# Patient Record
Sex: MALE | Race: WHITE | NOT HISPANIC OR LATINO | Employment: FULL TIME | URBAN - METROPOLITAN AREA
[De-identification: names, ages, dates, MRNs, and addresses within clinical notes are randomized per-mention and may not be internally consistent; named-entity substitution may affect disease eponyms.]

---

## 2017-04-20 DIAGNOSIS — E78.5 HYPERLIPIDEMIA LDL GOAL <70: ICD-10-CM

## 2017-04-20 DIAGNOSIS — I10 ESSENTIAL HYPERTENSION WITH GOAL BLOOD PRESSURE LESS THAN 140/90: ICD-10-CM

## 2017-04-20 NOTE — TELEPHONE ENCOUNTER
Simvastatin     Last Written Prescription Date: 07/12/2016  Last Fill Quantity: 90, # refills: 3  Last Office Visit with [facility], [facility] or [facility] prescribing provider: Dr Perkins       Lab Results   Component Value Date    CHOL 168 06/30/2016     Lab Results   Component Value Date    HDL 37 06/30/2016     Lab Results   Component Value Date    LDL 94 06/30/2016     Lab Results   Component Value Date    TRIG 183 06/30/2016     Lab Results   Component Value Date    CHOLHDLRATIO 5.2 07/28/2015     Lisinopril      Last Written Prescription Date: 07/12/2016  Last Fill Quantity: 90, # refills: 3  Last Office Visit with [facility], [facility] or [facility] prescribing provider: Dr Perkins       Potassium   Date Value Ref Range Status   12/30/2015 4.1 3.4 - 5.3 mmol/L Final     Creatinine   Date Value Ref Range Status   12/30/2015 0.87 0.66 - 1.25 mg/dL Final     BP Readings from Last 3 Encounters:   07/12/16 138/72   01/07/16 128/68   12/30/15 150/87

## 2017-04-24 RX ORDER — LISINOPRIL 10 MG/1
TABLET ORAL
Qty: 90 TABLET | Refills: 0 | Status: SHIPPED | OUTPATIENT
Start: 2017-04-24 | End: 2017-07-25

## 2017-04-24 RX ORDER — SIMVASTATIN 40 MG
TABLET ORAL
Qty: 90 TABLET | Refills: 0 | Status: SHIPPED | OUTPATIENT
Start: 2017-04-24 | End: 2017-07-25

## 2017-07-15 ENCOUNTER — TELEPHONE (OUTPATIENT)
Dept: LAB | Facility: CLINIC | Age: 67
End: 2017-07-15

## 2017-07-15 DIAGNOSIS — E11.9 CONTROLLED TYPE 2 DIABETES MELLITUS WITHOUT COMPLICATION, WITHOUT LONG-TERM CURRENT USE OF INSULIN (H): ICD-10-CM

## 2017-07-15 DIAGNOSIS — I10 HYPERTENSION GOAL BP (BLOOD PRESSURE) < 140/90: ICD-10-CM

## 2017-07-15 DIAGNOSIS — E66.01 MORBID OBESITY DUE TO EXCESS CALORIES (H): ICD-10-CM

## 2017-07-15 DIAGNOSIS — E78.5 HYPERLIPIDEMIA LDL GOAL <70: Primary | ICD-10-CM

## 2017-07-15 DIAGNOSIS — Z12.5 SPECIAL SCREENING FOR MALIGNANT NEOPLASM OF PROSTATE: ICD-10-CM

## 2017-07-15 NOTE — TELEPHONE ENCOUNTER
PT COMING IN FOR LABS ON 07/19/2017  NEED ORDERS

## 2017-07-18 DIAGNOSIS — E11.9 TYPE 2 DIABETES MELLITUS WITHOUT COMPLICATION (H): ICD-10-CM

## 2017-07-19 DIAGNOSIS — Z12.5 SPECIAL SCREENING FOR MALIGNANT NEOPLASM OF PROSTATE: ICD-10-CM

## 2017-07-19 DIAGNOSIS — E11.9 CONTROLLED TYPE 2 DIABETES MELLITUS WITHOUT COMPLICATION, WITHOUT LONG-TERM CURRENT USE OF INSULIN (H): ICD-10-CM

## 2017-07-19 DIAGNOSIS — E78.5 HYPERLIPIDEMIA LDL GOAL <70: ICD-10-CM

## 2017-07-19 DIAGNOSIS — E66.01 MORBID OBESITY DUE TO EXCESS CALORIES (H): ICD-10-CM

## 2017-07-19 DIAGNOSIS — I10 HYPERTENSION GOAL BP (BLOOD PRESSURE) < 140/90: ICD-10-CM

## 2017-07-19 LAB
ANION GAP SERPL CALCULATED.3IONS-SCNC: 7 MMOL/L (ref 3–14)
BUN SERPL-MCNC: 23 MG/DL (ref 7–30)
CALCIUM SERPL-MCNC: 10.1 MG/DL (ref 8.5–10.1)
CHLORIDE SERPL-SCNC: 104 MMOL/L (ref 94–109)
CHOLEST SERPL-MCNC: 185 MG/DL
CO2 SERPL-SCNC: 27 MMOL/L (ref 20–32)
CREAT SERPL-MCNC: 0.95 MG/DL (ref 0.66–1.25)
CREAT UR-MCNC: 62 MG/DL
ERYTHROCYTE [DISTWIDTH] IN BLOOD BY AUTOMATED COUNT: 13 % (ref 10–15)
GFR SERPL CREATININE-BSD FRML MDRD: 79 ML/MIN/1.7M2
GLUCOSE SERPL-MCNC: 209 MG/DL (ref 70–99)
HBA1C MFR BLD: 8.3 % (ref 4.3–6)
HCT VFR BLD AUTO: 45 % (ref 40–53)
HDLC SERPL-MCNC: 45 MG/DL
HGB BLD-MCNC: 16 G/DL (ref 13.3–17.7)
LDLC SERPL CALC-MCNC: 90 MG/DL
MCH RBC QN AUTO: 32.2 PG (ref 26.5–33)
MCHC RBC AUTO-ENTMCNC: 35.6 G/DL (ref 31.5–36.5)
MCV RBC AUTO: 91 FL (ref 78–100)
MICROALBUMIN UR-MCNC: 6 MG/L
MICROALBUMIN/CREAT UR: 10.05 MG/G CR (ref 0–17)
NONHDLC SERPL-MCNC: 140 MG/DL
PLATELET # BLD AUTO: 217 10E9/L (ref 150–450)
POTASSIUM SERPL-SCNC: 5 MMOL/L (ref 3.4–5.3)
PSA SERPL-ACNC: 1.78 UG/L (ref 0–4)
RBC # BLD AUTO: 4.97 10E12/L (ref 4.4–5.9)
SODIUM SERPL-SCNC: 138 MMOL/L (ref 133–144)
TRIGL SERPL-MCNC: 249 MG/DL
TSH SERPL DL<=0.005 MIU/L-ACNC: 2.29 MU/L (ref 0.4–4)
WBC # BLD AUTO: 7.7 10E9/L (ref 4–11)

## 2017-07-19 PROCEDURE — G0103 PSA SCREENING: HCPCS | Performed by: FAMILY MEDICINE

## 2017-07-19 PROCEDURE — 36415 COLL VENOUS BLD VENIPUNCTURE: CPT | Performed by: FAMILY MEDICINE

## 2017-07-19 PROCEDURE — 80061 LIPID PANEL: CPT | Performed by: FAMILY MEDICINE

## 2017-07-19 PROCEDURE — 84443 ASSAY THYROID STIM HORMONE: CPT | Performed by: FAMILY MEDICINE

## 2017-07-19 PROCEDURE — 82043 UR ALBUMIN QUANTITATIVE: CPT | Performed by: FAMILY MEDICINE

## 2017-07-19 PROCEDURE — 85027 COMPLETE CBC AUTOMATED: CPT | Performed by: FAMILY MEDICINE

## 2017-07-19 PROCEDURE — 80048 BASIC METABOLIC PNL TOTAL CA: CPT | Performed by: FAMILY MEDICINE

## 2017-07-19 PROCEDURE — 83036 HEMOGLOBIN GLYCOSYLATED A1C: CPT | Performed by: FAMILY MEDICINE

## 2017-07-19 NOTE — LETTER
[facility]  [address] [place] [id number]                  [phone]   July 20, 2017    Francisco Thornton  [address] [facility] [address]      Dear Francisco,    Here is a summary of your recent test results:    Kidney function (GFR) is normal.   -Sodium is normal.   -Potassium is normal.   -Cholesterol levels are slightly above your goal levels.  ADVISE: considering a more potent choelsterol medication to get yur LD level <70 -we can discuss this at your upcoming appointment. Also, regular exercise program with at least 30 minutes of aerobic exercise 3-4 days/week ( 45 minutes 4-6 days/week if weight loss needed), and a low saturated fat,/low carbohydrate diet are helpful to maintain this.  Rechecking your fasting cholesterol panel in 3 months is recommended (Lipid w/ LDL reflex).   -PSA (prostate specific antigen) test is normal.  This indicates a low likelihood of prostate cancer.  ADVISE: yearly recheck.   -TSH (thyroid stimulating hormone) level is normal which indicates normal thyroid function.   -Normal red blood cell (hgb) levels, normal white blood cell count and normal platelet levels.   -A1C test (average blood sugar the last 2-3 months) is above your goal.   ADVISE:  adjusting your medications - we can talk about this coming up as well.  Please check and record your blood sugars at least 4 times daily for 1 week prior to your appointment and bring for review.  Also, recheck your A1C in 3 months too. (A1C, DX: diabetes)   -Microalbumin (urine protein) test is normal.  ADVISE: recheck annually     Your test results are enclosed.      Please contact me if you have any questions.    In addition, here is a list of due or overdue Health Maintenance reminders.    Health Maintenance Due   Topic Date Due     Colon Cancer Screening - every 10 years.  01/20/2002     FALL RISK ASSESSMENT  01/20/2017     AORTIC ANEURYSM SCREENING (SYSTEM ASSIGNED)   01/20/2017     Diabetic Foot Exam - yearly  07/12/2017     Pneumococcal Vaccine (1 of 2 - PCV13) 07/12/2017     Wellness Visit with your Primary Provider - yearly  07/12/2017       Please call us at [phone] (or use [employer]) to address the above recommendations.            Thank you very much for trusting [facility]..     Healthy regards,          Dinh Perkins M.D.        Results for orders placed or performed in visit on 07/19/17   Basic metabolic panel  (Ca, Cl, CO2, Creat, Gluc, K, Na, BUN)   Result Value Ref Range    Sodium 138 133 - 144 mmol/L    Potassium 5.0 3.4 - 5.3 mmol/L    Chloride 104 94 - 109 mmol/L    Carbon Dioxide 27 20 - 32 mmol/L    Anion Gap 7 3 - 14 mmol/L    Glucose 209 (H) 70 - 99 mg/dL    Urea Nitrogen 23 7 - 30 mg/dL    Creatinine 0.95 0.66 - 1.25 mg/dL    GFR Estimate 79 >60 mL/min/1.7m2    GFR Estimate If Black >90   GFR Calc   >60 mL/min/1.7m2    Calcium 10.1 8.5 - 10.1 mg/dL   CBC with platelets   Result Value Ref Range    WBC 7.7 4.0 - 11.0 10e9/L    RBC Count 4.97 4.4 - 5.9 10e12/L    Hemoglobin 16.0 13.3 - 17.7 g/dL    Hematocrit 45.0 40.0 - 53.0 %    MCV 91 78 - 100 fl    MCH 32.2 26.5 - 33.0 pg    MCHC 35.6 31.5 - 36.5 g/dL    RDW 13.0 10.0 - 15.0 %    Platelet Count 217 150 - 450 10e9/L   Lipid panel reflex to direct LDL   Result Value Ref Range    Cholesterol 185 <200 mg/dL    Triglycerides 249 (H) <150 mg/dL    HDL Cholesterol 45 >39 mg/dL    LDL Cholesterol Calculated 90 <100 mg/dL    Non HDL Cholesterol 140 (H) <130 mg/dL   Prostate spec antigen screen   Result Value Ref Range    PSA 1.78 0 - 4 ug/L   TSH with free T4 reflex   Result Value Ref Range    TSH 2.29 0.40 - 4.00 mU/L   Albumin Random Urine Quantitative   Result Value Ref Range    Creatinine Urine 62 mg/dL    Albumin Urine mg/L 6 mg/L    Albumin Urine mg/g Cr 10.05 0 - 17 mg/g Cr   Hemoglobin A1c   Result Value Ref Range    Hemoglobin A1C 8.3 (H) 4.3 - 6.0 %

## 2017-07-19 NOTE — TELEPHONE ENCOUNTER
metFORMIN (GLUCOPHAGE) 1000 MG tablet         Last Written Prescription Date: 7/12/2016  Last Fill Quantity: 180 tablet, # refills: 3  Last Office Visit with [facility], [facility] or [facility] prescribing provider:  7/12/2016   Next 5 appointments (look out 90 days)     Jul 25, 2017  4:00 PM CDT   Office Visit with Geovany Perkins MD   [facility] ([facility])    [address] [place] [phone]                   BP Readings from Last 3 Encounters:   07/12/16 138/72   01/07/16 128/68   12/30/15 150/87     Lab Results   Component Value Date    MICROL 7 06/30/2016     Lab Results   Component Value Date    UMALCR 10.54 06/30/2016     Creatinine   Date Value Ref Range Status   12/30/2015 0.87 0.66 - 1.25 mg/dL Final   ]  GFR Estimate   Date Value Ref Range Status   12/30/2015 89 >60 mL/min/1.7m2 Final     Comment:     Non  GFR Calc   07/28/2015 >90  Non  GFR Calc   >60 mL/min/1.7m2 Final   08/28/2014 86 >60 mL/min/1.7m2 Final     Comment:     Non  GFR Calc     GFR Estimate If Black   Date Value Ref Range Status   12/30/2015 >90   GFR Calc   >60 mL/min/1.7m2 Final   07/28/2015 >90   GFR Calc   >60 mL/min/1.7m2 Final   08/28/2014 >90   GFR Calc   >60 mL/min/1.7m2 Final     Lab Results   Component Value Date    CHOL 168 06/30/2016     Lab Results   Component Value Date    HDL 37 06/30/2016     Lab Results   Component Value Date    LDL 94 06/30/2016     Lab Results   Component Value Date    TRIG 183 06/30/2016     Lab Results   Component Value Date    CHOLHDLRATIO 5.2 07/28/2015     Lab Results   Component Value Date    AST 42 12/30/2015     Lab Results   Component Value Date    ALT 82 12/30/2015     Lab Results   Component Value Date    A1C 6.4 06/30/2016    A1C 7.3 07/28/2015    A1C 7.0 09/02/2014    A1C 8.2 03/19/2014    A1C 10.0 08/08/2013     Potassium   Date Value Ref  Range Status   12/30/2015 4.1 3.4 - 5.3 mmol/L Final       glimepiride (AMARYL) 2 MG tablet         Last Written Prescription Date: 7/12/2016  Last Fill Quantity: 90 tablet, # refills: 3  Last Office Visit with [facility], [facility] or [facility] prescribing provider:  7/12/2016   Next 5 appointments (look out 90 days)     Jul 25, 2017  4:00 PM CDT   Office Visit with Geovany Perkins MD   [facility]    [address] [place] [phone] [phone]                   BP Readings from Last 3 Encounters:   07/12/16 138/72   01/07/16 128/68   12/30/15 150/87     Lab Results   Component Value Date    MICROL 7 06/30/2016     Lab Results   Component Value Date    UMALCR 10.54 06/30/2016     Creatinine   Date Value Ref Range Status   12/30/2015 0.87 0.66 - 1.25 mg/dL Final   ]  GFR Estimate   Date Value Ref Range Status   12/30/2015 89 >60 mL/min/1.7m2 Final     Comment:     Non  GFR Calc   07/28/2015 >90  Non  GFR Calc   >60 mL/min/1.7m2 Final   08/28/2014 86 >60 mL/min/1.7m2 Final     Comment:     Non  GFR Calc     GFR Estimate If Black   Date Value Ref Range Status   12/30/2015 >90   GFR Calc   >60 mL/min/1.7m2 Final   07/28/2015 >90   GFR Calc   >60 mL/min/1.7m2 Final   08/28/2014 >90   GFR Calc   >60 mL/min/1.7m2 Final     Lab Results   Component Value Date    CHOL 168 06/30/2016     Lab Results   Component Value Date    HDL 37 06/30/2016     Lab Results   Component Value Date    LDL 94 06/30/2016     Lab Results   Component Value Date    TRIG 183 06/30/2016     Lab Results   Component Value Date    CHOLHDLRATIO 5.2 07/28/2015     Lab Results   Component Value Date    AST 42 12/30/2015     Lab Results   Component Value Date    ALT 82 12/30/2015     Lab Results   Component Value Date    A1C 6.4 06/30/2016    A1C 7.3 07/28/2015    A1C 7.0 09/02/2014    A1C 8.2 03/19/2014     A1C 10.0 08/08/2013     Potassium   Date Value Ref Range Status   12/30/2015 4.1 3.4 - 5.3 mmol/L Final

## 2017-07-20 NOTE — PROGRESS NOTES
Note to Staff: please send a result letter    -Kidney function (GFR) is normal.  -Sodium is normal.  -Potassium is normal.  -Cholesterol levels are slightly above your goal levels.  ADVISE: considering a more potent choelsterol medication to get yur LD level <70 -we can discuss this at your upcoming appointment. Also, regular exercise program with at least 30 minutes of aerobic exercise 3-4 days/week ( 45 minutes 4-6 days/week if weight loss needed), and a low saturated fat,/low carbohydrate diet are helpful to maintain this.  Rechecking your fasting cholesterol panel in 3 months is recommended (Lipid w/ LDL reflex).  -PSA (prostate specific antigen) test is normal.  This indicates a low likelihood of prostate cancer.  ADVISE: yearly recheck.   -TSH (thyroid stimulating hormone) level is normal which indicates normal thyroid function.  -Normal red blood cell (hgb) levels, normal white blood cell count and normal platelet levels.  -A1C test (average blood sugar the last 2-3 months) is above your goal.   ADVISE:  adjusting your medications - we can talk about this coming up as well.  Please check and record your blood sugars at least 4 times daily for 1 week prior to your appointment and bring for review.  Also, recheck your A1C in 3 months too. (A1C, DX: diabetes)  -Microalbumin (urine protein) test is normal.  ADVISE: recheck annually     For additional lab test information, labtestsonline.org is an excellent reference.

## 2017-07-21 RX ORDER — GLIMEPIRIDE 2 MG/1
TABLET ORAL
Qty: 30 TABLET | Refills: 0 | Status: SHIPPED | OUTPATIENT
Start: 2017-07-21 | End: 2017-07-25

## 2017-07-21 NOTE — TELEPHONE ENCOUNTER
Due for an Office visit for further refills, only fill for 30 days     Aundrea Schneider RN, BSN  [facility]

## 2017-07-25 ENCOUNTER — OFFICE VISIT (OUTPATIENT)
Dept: FAMILY MEDICINE | Facility: CLINIC | Age: 67
End: 2017-07-25
Payer: COMMERCIAL

## 2017-07-25 VITALS
WEIGHT: 240 LBS | DIASTOLIC BLOOD PRESSURE: 78 MMHG | BODY MASS INDEX: 36.37 KG/M2 | HEART RATE: 86 BPM | SYSTOLIC BLOOD PRESSURE: 120 MMHG | OXYGEN SATURATION: 96 % | TEMPERATURE: 98.4 F | HEIGHT: 68 IN

## 2017-07-25 DIAGNOSIS — Z13.6 SCREENING FOR AAA (ABDOMINAL AORTIC ANEURYSM): ICD-10-CM

## 2017-07-25 DIAGNOSIS — E78.5 HYPERLIPIDEMIA LDL GOAL <70: ICD-10-CM

## 2017-07-25 DIAGNOSIS — E66.01 MORBID OBESITY DUE TO EXCESS CALORIES (H): ICD-10-CM

## 2017-07-25 DIAGNOSIS — I10 ESSENTIAL HYPERTENSION WITH GOAL BLOOD PRESSURE LESS THAN 140/90: ICD-10-CM

## 2017-07-25 DIAGNOSIS — I10 HYPERTENSION GOAL BP (BLOOD PRESSURE) < 140/90: ICD-10-CM

## 2017-07-25 DIAGNOSIS — Z87.891 HISTORY OF SMOKING: ICD-10-CM

## 2017-07-25 DIAGNOSIS — Z23 NEED FOR PROPHYLACTIC VACCINATION AGAINST STREPTOCOCCUS PNEUMONIAE (PNEUMOCOCCUS): ICD-10-CM

## 2017-07-25 DIAGNOSIS — E11.42 DIABETIC POLYNEUROPATHY ASSOCIATED WITH TYPE 2 DIABETES MELLITUS (H): ICD-10-CM

## 2017-07-25 DIAGNOSIS — E11.9 CONTROLLED TYPE 2 DIABETES MELLITUS WITHOUT COMPLICATION, WITHOUT LONG-TERM CURRENT USE OF INSULIN (H): Primary | ICD-10-CM

## 2017-07-25 DIAGNOSIS — Z13.89 SCREENING FOR DIABETIC PERIPHERAL NEUROPATHY: ICD-10-CM

## 2017-07-25 DIAGNOSIS — E11.9 TYPE 2 DIABETES MELLITUS WITHOUT COMPLICATION, WITHOUT LONG-TERM CURRENT USE OF INSULIN (H): ICD-10-CM

## 2017-07-25 PROCEDURE — 99214 OFFICE O/P EST MOD 30 MIN: CPT | Mod: 25 | Performed by: FAMILY MEDICINE

## 2017-07-25 PROCEDURE — 90471 IMMUNIZATION ADMIN: CPT | Performed by: FAMILY MEDICINE

## 2017-07-25 PROCEDURE — 99207 C FOOT EXAM  NO CHARGE: CPT | Performed by: FAMILY MEDICINE

## 2017-07-25 PROCEDURE — 90670 PCV13 VACCINE IM: CPT | Performed by: FAMILY MEDICINE

## 2017-07-25 RX ORDER — SIMVASTATIN 40 MG
40 TABLET ORAL AT BEDTIME
Qty: 90 TABLET | Refills: 1 | Status: SHIPPED | OUTPATIENT
Start: 2017-07-25 | End: 2018-01-23

## 2017-07-25 RX ORDER — GLIMEPIRIDE 4 MG/1
4 TABLET ORAL
Qty: 90 TABLET | Refills: 1 | Status: SHIPPED | OUTPATIENT
Start: 2017-07-25 | End: 2018-01-23

## 2017-07-25 RX ORDER — LISINOPRIL 10 MG/1
10 TABLET ORAL DAILY
Qty: 90 TABLET | Refills: 1 | Status: SHIPPED | OUTPATIENT
Start: 2017-07-25 | End: 2018-01-23

## 2017-07-25 NOTE — NURSING NOTE
"Chief Complaint   Patient presents with     Recheck Medication       Initial /78 (BP Location: Right arm, Patient Position: Chair, Cuff Size: Adult Large)  Pulse 86  Temp 98.4  F (36.9  C) (Oral)  Ht 5' 8\" (1.727 m)  Wt 240 lb (108.9 kg)  SpO2 96%  BMI 36.49 kg/m2 Estimated body mass index is 36.49 kg/(m^2) as calculated from the following:    Height as of this encounter: 5' 8\" (1.727 m).    Weight as of this encounter: 240 lb (108.9 kg).  Medication Reconciliation: complete  "

## 2017-07-25 NOTE — PROGRESS NOTES
SUBJECTIVE:                                                    Francisco Thornton is a 67 year old male who presents to clinic today for the following health issues:    Diabetes Follow-up      Patient is checking blood sugars: no - meter is broken    Diabetic concerns: The patient does not eat a diabetic diet     Symptoms of hypoglycemia (low blood sugar): none     Paresthesias (numbness or burning in feet) or sores: Yes      Date of last diabetic eye exam: this year    The patient currently takes 1000 MG of metformin twice daily and 2 MG of glimepiride daily.    He states that he has numbness and tingling in his feet bilaterally    Hyperlipidemia Follow-Up      Rate your low fat/cholesterol diet?: fair    Taking statin?  Yes, no muscle aches from statin    Other lipid medications/supplements?:  None    The patient takes 40 MG of simvastatin daily without complications    Hypertension Follow-up      Outpatient blood pressures are not being checked.    Low Salt Diet: low salt    The patient takes 10 MG of lisinopril daily without complications.            Amount of exercise or physical activity: walks a lot at work    Problems taking medications regularly: No    Medication side effects: none  Diet: low salt, low fat/cholesterol and diabetic      Problem list and histories reviewed & adjusted, as indicated.  Additional history: as documented    ROS:  Constitutional, HEENT, cardiovascular, pulmonary, GI, , musculoskeletal, neuro, skin, endocrine and psych systems are negative, except as otherwise noted.    This document serves as a record of the services and decisions personally performed and made by Geovany Perkins MD. It was created on his behalf by Carrie Cadena, a trained medical scribe. The creation of this document is based on the provider's statements to the medical scribe.  Carrie Cadena 4:06 PM 7/25/2017  OBJECTIVE:                                                    /78 (BP Location: Right arm,  "Patient Position: Chair, Cuff Size: Adult Large)  Pulse 86  Temp 98.4  F (36.9  C) (Oral)  Ht 1.727 m (5' 8\")  Wt 108.9 kg (240 lb)  SpO2 96%  BMI 36.49 kg/m2 Body mass index is 36.49 kg/(m^2).   GENERAL: healthy, alert, well nourished, well hydrated, no distress  HENT: ear canals- normal; TMs- normal; Nose- normal; Mouth- no ulcers, no lesions  NECK: no tenderness, no adenopathy, no asymmetry, no masses, no stiffness; thyroid- normal to palpation  RESP: lungs clear to auscultation - no rales, no rhonchi, no wheezes  CV: regular rates and rhythm, normal S1 S2, no S3 or S4 and no murmur, no click or rub -  ABDOMEN: soft, no tenderness, no  hepatosplenomegaly, no masses, normal bowel sounds  MS: extremities- no gross deformities noted, no edema  SKIN: no suspicious lesions, no rashes  Diabetic foot exam: normal DP and PT pulses, no trophic changes or ulcerative lesions and normal sensory exam    Diagnostic test results:  none      ASSESSMENT/PLAN:         Francisco was seen today for recheck medication.    Diagnoses and all orders for this visit:    Controlled type 2 diabetes mellitus without complication, without long-term current use of insulin (H) - uncontrolled - continue medication. Increase dose of glimepiride to 4 MG.  -     blood glucose monitoring (NO BRAND SPECIFIED) meter device kit; Use to test blood sugar 1-2 times daily or as directed.  With test strips and all needed suplies  -     blood glucose (NO BRAND SPECIFIED) lancets standard; Use to test blood sugar 1-2 times daily or as directed.    Screening for diabetic peripheral neuropathy  -     FOOT EXAM  NO CHARGE [78904.114]    Screening for AAA (abdominal aortic aneurysm) - schedule aortic aneurism screening  -     Abdominal Aortic Aneurysm Screening/Tracking  -     US Abdominal Aorta Limited; Future    History of smoking - schedule aortic aneurism screening  -     US Abdominal Aorta Limited; Future    Morbid obesity due to excess calories (H) - " "healthy diet and exercise    Hypertension goal BP (blood pressure) < 140/90 - controlled - continue medication.    Type 2 diabetes mellitus without complication, without long-term current use of insulin (H) - uncontrolled - continue medication. Increase dose of glimepiride to 4 MG.  -     glimepiride (AMARYL) 4 MG tablet; Take 1 tablet (4 mg) by mouth every morning (before breakfast)  -     metFORMIN (GLUCOPHAGE) 1000 MG tablet; Take 1 tablet (1,000 mg) by mouth 2 times daily (with meals)    Hyperlipidemia LDL goal <70 - controlled - continue medication.  -     simvastatin (ZOCOR) 40 MG tablet; Take 1 tablet (40 mg) by mouth At Bedtime    Essential hypertension with goal blood pressure less than 140/90 - controlled - continue medication.  -     lisinopril (PRINIVIL/ZESTRIL) 10 MG tablet; Take 1 tablet (10 mg) by mouth daily    Diabetic polyneuropathy associated with type 2 diabetes mellitus (H)    Need for prophylactic vaccination against Streptococcus pneumoniae (pneumococcus)  -     VACCINE ADMINISTRATION, INITIAL  -     PNEUMOCOCCAL CONJ VACCINE 13 VALENT IM        Risks, benefits and alternatives of treatments discussed. Plan agreed on.      Followup: 3 months    Will call, return to clinic, or go to ED if worsening or symptoms not improving as discussed.    See patient instructions.     Tobacco Cessation:   reports that he quit smoking about 5 years ago. His smoking use included Cigarettes. He has a 25.00 pack-year smoking history. He has never used smokeless tobacco.    BMI:   Estimated body mass index is 36.49 kg/(m^2) as calculated from the following:    Height as of this encounter: 1.727 m (5' 8\").    Weight as of this encounter: 108.9 kg (240 lb).   Weight management plan: Discussed healthy diet and exercise guidelines and patient will follow up in 12 months in clinic to re-evaluate.      Health Maintenance Topics with due status: Overdue       Topic Date Due    COLON CANCER SCREEN (SYSTEM ASSIGNED) " 01/20/2002    FALL RISK ASSESSMENT 01/20/2017    AORTIC ANEURYSM SCREENING (SYSTEM ASSIGNED) 01/20/2017    FOOT EXAM Q1 YEAR 07/12/2017    PNEUMOCOCCAL 07/12/2017     Health Maintenance Topics with due status: Due On       Topic Date Due    WELLNESS VISIT Q1 YR 07/12/2017       Health maintenance reviewed/updated? Yes The information in this document, created by a scribe for me, accurately reflects the services I personally performed and the decisions made by me. I have reviewed and approved this document for accuracy.      Dinh Perkins MD

## 2017-07-25 NOTE — MR AVS SNAPSHOT
After Visit Summary   7/25/2017    Francisco Thornton    MRN: [id number]           Patient Information     Date Of Birth          1950        Visit Information        Provider Department      7/25/2017 4:00 PM Geovany Perkins MD [facility]        Today's Diagnoses     Controlled type 2 diabetes mellitus without complication, without long-term current use of insulin (H)    -  1    Screening for diabetic peripheral neuropathy        Screening for AAA (abdominal aortic aneurysm)        History of smoking        Morbid obesity due to excess calories (H)        Hypertension goal BP (blood pressure) < 140/90        Type 2 diabetes mellitus without complication, without long-term current use of insulin (H)        Hyperlipidemia LDL goal <70        Essential hypertension with goal blood pressure less than 140/90        Diabetic polyneuropathy associated with type 2 diabetes mellitus (H)        Need for prophylactic vaccination against Streptococcus pneumoniae (pneumococcus)           Follow-ups after your visit        Future tests that were ordered for you today     Open Future Orders        Priority Expected Expires Ordered    US Abdominal Aorta Limited Routine  7/25/2018 7/25/2017            Who to contact     If you have questions or need follow up information about today's clinic visit or your schedule please contact [facility] directly at [phone].  Normal or non-critical lab and imaging results will be communicated to you by MyChart, letter or phone within 4 business days after the clinic has received the results. If you do not hear from us within 7 days, please contact the clinic through MyChart or phone. If you have a critical or abnormal lab result, we will notify you by phone as soon as possible.  Submit refill requests through [employer] or call your pharmacy and they will forward the refill request to us. Please allow 3 business days for your refill to be  "completed.          Additional Information About Your Visit        [employer] Information     [employer] lets you send messages to your doctor, view your test results, renew your prescriptions, schedule appointments and more. To sign up, go to [email or web address] . Click on \"Log in\" on the left side of the screen, which will take you to the Welcome page. Then click on \"Sign up Now\" on the right side of the page.     You will be asked to enter the access code listed below, as well as some personal information. Please follow the directions to create your username and password.     Your access code is: [email or web address]  Expires: 10/23/2017  4:19 PM     Your access code will  in 90 days. If you need help or a new code, please call your [facility] clinic or [phone].        Care EveryWhere ID     This is your Care EveryWhere ID. This could be used by other organizations to access your [facility] medical records  [id number]        Your Vitals Were     Pulse Temperature Height Pulse Oximetry BMI (Body Mass Index)       86 98.4  F (36.9  C) (Oral) 5' 8\" (1.727 m) 96% 36.49 kg/m2        Blood Pressure from Last 3 Encounters:   17 120/78   16 138/72   16 128/68    Weight from Last 3 Encounters:   17 240 lb (108.9 kg)   16 235 lb (106.6 kg)   16 237 lb (107.5 kg)              We Performed the Following     Abdominal Aortic Aneurysm Screening/Tracking     FOOT EXAM  NO CHARGE [18330.114]     PNEUMOCOCCAL CONJ VACCINE 13 VALENT IM     VACCINE ADMINISTRATION, INITIAL          Today's Medication Changes          These changes are accurate as of: 17  4:19 PM.  If you have any questions, ask your nurse or doctor.               Start taking these medicines.        Dose/Directions    blood glucose lancets standard   Commonly known as:  no brand specified   Used for:  Controlled type 2 diabetes mellitus without complication, without long-term current use of insulin (H)   Started by:  Maddie, " Geovany GAFFNEY MD        Use to test blood sugar 1-2 times daily or as directed.   Quantity:  100 each   Refills:  11         These medicines have changed or have updated prescriptions.        Dose/Directions    * blood glucose monitoring meter device kit   This may have changed:  Another medication with the same name was added. Make sure you understand how and when to take each.   Used for:  Type 2 diabetes mellitus without complication (H)   Changed by:  Geovany Perkins MD        Use to test blood sugars 2 times daily or as directed.   Quantity:  1 kit   Refills:  0       * blood glucose monitoring meter device kit   Commonly known as:  no brand specified   This may have changed:  You were already taking a medication with the same name, and this prescription was added. Make sure you understand how and when to take each.   Used for:  Controlled type 2 diabetes mellitus without complication, without long-term current use of insulin (H)   Changed by:  Geovany Perkins MD        Use to test blood sugar 1-2 times daily or as directed.  With test strips and all needed suplies   Quantity:  1 kit   Refills:  0       glimepiride 4 MG tablet   Commonly known as:  AMARYL   This may have changed:  See the new instructions.   Used for:  Type 2 diabetes mellitus without complication, without long-term current use of insulin (H)   Changed by:  Geovany Perkins MD        Dose:  4 mg   Take 1 tablet (4 mg) by mouth every morning (before breakfast)   Quantity:  90 tablet   Refills:  1       lisinopril 10 MG tablet   Commonly known as:  PRINIVIL/ZESTRIL   This may have changed:  See the new instructions.   Used for:  Essential hypertension with goal blood pressure less than 140/90   Changed by:  Geovany Perkins MD        Dose:  10 mg   Take 1 tablet (10 mg) by mouth daily   Quantity:  90 tablet   Refills:  1       metFORMIN 1000 MG tablet   Commonly known as:  GLUCOPHAGE   This may have changed:  See the new instructions.   Used  for:  Type 2 diabetes mellitus without complication, without long-term current use of insulin (H)   Changed by:  Geovany Perkins MD        Dose:  1000 mg   Take 1 tablet (1,000 mg) by mouth 2 times daily (with meals)   Quantity:  180 tablet   Refills:  1       simvastatin 40 MG tablet   Commonly known as:  ZOCOR   This may have changed:  See the new instructions.   Used for:  Hyperlipidemia LDL goal <70   Changed by:  Geovany Perkins MD        Dose:  40 mg   Take 1 tablet (40 mg) by mouth At Bedtime   Quantity:  90 tablet   Refills:  1       * Notice:  This list has 2 medication(s) that are the same as other medications prescribed for you. Read the directions carefully, and ask your doctor or other care provider to review them with you.         Where to get your medicines      These medications were sent to [facility] - [place], [place] - [address] [address]  [address] [address], [place] [address]     Phone:  [phone]     blood glucose lancets standard    blood glucose monitoring meter device kit    glimepiride 4 MG tablet    lisinopril 10 MG tablet    metFORMIN 1000 MG tablet    simvastatin 40 MG tablet                Primary Care Provider Office Phone # Fax #    Geovany Perkins [phone]       [facility] [address] [place] [address]        Equal Access to Services     SONAL TOM AH: Hadii aad ku hadasho Soomaali, waaxda luqadaha, qaybta kaalmada adeegyada, waxay idiin hayaan adeleanne khalyssa acevedo. So [facility] [phone].    ATENCIÓN: Si habla español, tiene a smith disposición servicios gratuitos de asistencia lingüística. Soni al [phone].    We comply with applicable federal civil rights laws and [place] laws. We do not discriminate on the basis of race, color, national origin, age, disability sex, sexual orientation or gender identity.            Thank you!     Thank you for choosing [facility]  for your care. Our goal is always to  provide you with excellent care. Hearing back from our patients is one way we can continue to improve our services. Please take a few minutes to complete the written survey that you may receive in the mail after your visit with us. Thank you!             Your Updated Medication List - Protect others around you: Learn how to safely use, store and throw away your medicines at www.disposemymeds.org.          This list is accurate as of: 7/25/17  4:19 PM.  Always use your most recent med list.                   Brand Name Dispense Instructions for use Diagnosis    aspirin 325 MG tablet      take 325 mg by mouth as needed.        blood glucose lancets standard    no brand specified    100 each    Use to test blood sugar 1-2 times daily or as directed.    Controlled type 2 diabetes mellitus without complication, without long-term current use of insulin (H)       blood glucose monitoring lancets           * blood glucose monitoring meter device kit     1 kit    Use to test blood sugars 2 times daily or as directed.    Type 2 diabetes mellitus without complication (H)       * blood glucose monitoring meter device kit    no brand specified    1 kit    Use to test blood sugar 1-2 times daily or as directed.  With test strips and all needed suplies    Controlled type 2 diabetes mellitus without complication, without long-term current use of insulin (H)       blood glucose monitoring test strip    ACCU-CHEK SMARTVIEW    100 each    Use to test blood sugars 2x daily or as needed.    Type 2 diabetes mellitus without complication (H)       glimepiride 4 MG tablet    AMARYL    90 tablet    Take 1 tablet (4 mg) by mouth every morning (before breakfast)    Type 2 diabetes mellitus without complication, without long-term current use of insulin (H)       HYDROcodone-acetaminophen 5-325 MG per tablet    NORCO    30 tablet    Take 1 tablet by mouth every 6 hours as needed for moderate to severe pain    Spondylolisthesis of lumbar region        lisinopril 10 MG tablet    PRINIVIL/ZESTRIL    90 tablet    Take 1 tablet (10 mg) by mouth daily    Essential hypertension with goal blood pressure less than 140/90       metFORMIN 1000 MG tablet    GLUCOPHAGE    180 tablet    Take 1 tablet (1,000 mg) by mouth 2 times daily (with meals)    Type 2 diabetes mellitus without complication, without long-term current use of insulin (H)       simvastatin 40 MG tablet    ZOCOR    90 tablet    Take 1 tablet (40 mg) by mouth At Bedtime    Hyperlipidemia LDL goal <70       tadalafil 20 MG tablet    CIALIS    3 tablet    Take 0.5-1 tablets (10-20 mg) by mouth daily as needed for erectile dysfunction Never use with nitroglycerin, terazosin or doxazosin.    Erectile dysfunction       * Notice:  This list has 2 medication(s) that are the same as other medications prescribed for you. Read the directions carefully, and ask your doctor or other care provider to review them with you.

## 2017-07-26 ENCOUNTER — DOCUMENTATION ONLY (OUTPATIENT)
Dept: VASCULAR SURGERY | Facility: CLINIC | Age: 67
End: 2017-07-26

## 2017-10-25 DIAGNOSIS — E11.9 CONTROLLED TYPE 2 DIABETES MELLITUS WITHOUT COMPLICATION, WITHOUT LONG-TERM CURRENT USE OF INSULIN (H): ICD-10-CM

## 2017-10-25 LAB — HBA1C MFR BLD: 7.4 % (ref 4.3–6)

## 2017-10-25 PROCEDURE — 36415 COLL VENOUS BLD VENIPUNCTURE: CPT | Performed by: FAMILY MEDICINE

## 2017-10-25 PROCEDURE — 83036 HEMOGLOBIN GLYCOSYLATED A1C: CPT | Performed by: FAMILY MEDICINE

## 2017-10-25 NOTE — LETTER
[address] [address]                  [phone]   October 27, 2017    Francisco Thornton  [address] [facility] [address]      Dear Francisco,    Here is a summary of your recent test results:    -A1C (test of diabetes control the last 2-3 months) is near your goal. Please continue with current plan. Also, see me and recheck your A1C test in 6 months.       For additional lab test information, [email or web address] is an excellent reference.     Your test results are enclosed.      Please contact me if you have any questions.    In addition, here is a list of due or overdue Health Maintenance reminders.    Health Maintenance Due   Topic Date Due     Colon Cancer Screening - every 10 years.  01/20/2002     FALL RISK ASSESSMENT  01/20/2017     Wellness Visit with your Primary Provider - yearly  07/12/2017     Flu Vaccine - yearly  09/01/2017     Eye Exam - yearly  09/28/2017       Please call us at [phone] (or use [employer]) to address the above recommendations.            Thank you very much for trusting [facility]..     Healthy regards,          Dinh Perkins M.D.        Results for orders placed or performed in visit on 10/25/17   Hemoglobin A1c   Result Value Ref Range    Hemoglobin A1C 7.4 (H) 4.3 - 6.0 %

## 2017-10-26 NOTE — PROGRESS NOTES
Note to Staff: please send a result letter    -A1C (test of diabetes control the last 2-3 months) is near your goal. Please continue with current plan. Also, see me and recheck your A1C test in 6 months.      For additional lab test information, [email or web address] is an excellent reference.

## 2018-01-23 DIAGNOSIS — E78.5 HYPERLIPIDEMIA LDL GOAL <70: ICD-10-CM

## 2018-01-23 DIAGNOSIS — I10 ESSENTIAL HYPERTENSION WITH GOAL BLOOD PRESSURE LESS THAN 140/90: ICD-10-CM

## 2018-01-23 DIAGNOSIS — E11.9 TYPE 2 DIABETES MELLITUS WITHOUT COMPLICATION, WITHOUT LONG-TERM CURRENT USE OF INSULIN (H): ICD-10-CM

## 2018-01-24 RX ORDER — LISINOPRIL 10 MG/1
TABLET ORAL
Qty: 30 TABLET | Refills: 0 | Status: SHIPPED | OUTPATIENT
Start: 2018-01-24 | End: 2018-02-12

## 2018-01-24 RX ORDER — GLIMEPIRIDE 4 MG/1
TABLET ORAL
Qty: 30 TABLET | Refills: 0 | Status: SHIPPED | OUTPATIENT
Start: 2018-01-24 | End: 2018-02-12

## 2018-01-24 RX ORDER — SIMVASTATIN 40 MG
TABLET ORAL
Qty: 30 TABLET | Refills: 0 | Status: SHIPPED | OUTPATIENT
Start: 2018-01-24 | End: 2018-02-12

## 2018-01-24 NOTE — TELEPHONE ENCOUNTER
"Requested Prescriptions   Pending Prescriptions Disp Refills     metFORMIN (GLUCOPHAGE) 1000 MG tablet [Pharmacy Med Name: METFORMIN HCL 1,000 MG TABLET] 180 tablet 1     Sig: TAKE 1 TAB BY MOUTH TWICE DAILY (WITH MEALS)    Biguanide Agents Failed    1/23/2018  1:33 AM       Failed - Patient's BP is less than 140/90    BP Readings from Last 3 Encounters:   07/25/17 120/78   07/12/16 138/72   01/07/16 128/68                Failed - Recent (6 mos) or future visit with authorizing provider's specialty    Patient had office visit in the last 6 months or has a visit in the next 30 days with authorizing provider.  See \"Patient Info\" tab in inbasket, or \"Choose Columns\" in Meds & Orders section of the refill encounter.           Passed - Patient has documented LDL within the past 12 mos.    Recent Labs   Lab Test  07/19/17   0748   LDL  90            Passed - Patient has had a Microalbumin in the past 12 mos.    Recent Labs   Lab Test  07/19/17   0749   MICROL  6   UMALCR  10.05            Passed - Patient is age 10 or older       Passed - Patient has documented A1c within the specified period of time.    Recent Labs   Lab Test  10/25/17   0729   A1C  7.4*            Passed - Patient's CR is NOT>1.4 OR Patient's EGFR is NOT<45 within past 12 mos.    Recent Labs   Lab Test  07/19/17   0748   GFRESTIMATED  79   GFRESTBLACK  >90   GFR Calc         Recent Labs   Lab Test  07/19/17   0748   CR  0.95            Passed - Patient does NOT have a diagnosis of CHF.        simvastatin (ZOCOR) 40 MG tablet [Pharmacy Med Name: SIMVASTATIN 40 MG TABLET] 90 tablet 1     Sig: TAKE 1 TABLET (40 MG) BY MOUTH NIGHTLY AT BEDTIME    Statins Protocol Passed    1/23/2018  1:33 AM       Passed - LDL on file in past 12 months    Recent Labs   Lab Test  07/19/17   0748   LDL  90            Passed - No abnormal creatine kinase in past 12 months    No lab results found.         Passed - Recent or future visit with authorizing provider " "   Patient had office visit in the last year or has a visit in the next 30 days with authorizing provider.  See \"Patient Info\" tab in inbasket, or \"Choose Columns\" in Meds & Orders section of the refill encounter.            Passed - Patient is age 18 or older        glimepiride (AMARYL) 4 MG tablet [Pharmacy Med Name: GLIMEPIRIDE 4 MG TABLET] 90 tablet 1     Sig: TAKE 1 TABLET BY MOUTH EVERY MORNING (BEFORE BREAKFAST)    Sulfonylurea Agents Failed    1/23/2018  1:33 AM       Failed - Patient's BP is less than 140/90    BP Readings from Last 3 Encounters:   07/25/17 120/78   07/12/16 138/72   01/07/16 128/68                Failed - Patient has had an appointment with authorizing provider within the past 6 mos. or  within next 30 days    Patient had office visit in the last 6 months or has a visit in the next 30 days with authorizing provider.  See \"Patient Info\" tab in inbasket, or \"Choose Columns\" in Meds & Orders section of the refill encounter.           Passed - Patient has documented LDL within the past 12 mos.    Recent Labs   Lab Test  07/19/17   0748   LDL  90            Passed - Patient has had a Microalbumin in the past 12 mos.    Recent Labs   Lab Test  07/19/17   0749   MICROL  6   UMALCR  10.05            Passed - Patient has documented A1c within the specified period of time.    Recent Labs   Lab Test  10/25/17   0729   A1C  7.4*            Passed - Patient is age 18 or older       Passed - Patient has a recent creatinine (normal) within the past 12 mos.    Recent Labs   Lab Test  07/19/17   0748   CR  0.95             lisinopril (PRINIVIL/ZESTRIL) 10 MG tablet [Pharmacy Med Name: LISINOPRIL 10 MG TABLET] 90 tablet 1     Sig: TAKE 1 TABLET (10 MG) BY MOUTH DAILY    ACE Inhibitors (Including Combos) Protocol Passed    1/23/2018  1:33 AM       Passed - Blood pressure under 140/90    BP Readings from Last 3 Encounters:   07/25/17 120/78   07/12/16 138/72   01/07/16 128/68                Passed - Recent or " "future visit with authorizing provider's specialty    Patient had office visit in the last year or has a visit in the next 30 days with authorizing provider.  See \"Patient Info\" tab in inbasket, or \"Choose Columns\" in Meds & Orders section of the refill encounter.            Passed - Patient is age 18 or older       Passed - Normal serum creatinine on file in past 12 months    Recent Labs   Lab Test  07/19/17   0748   CR  0.95            Passed - Normal serum potassium on file in past 12 months    Recent Labs   Lab Test  07/19/17   0748   POTASSIUM  5.0     Last Written Prescription Date:  7/25/17  Last Fill Quantity: 3 months ,  # refills: 1   [place] Office Visit [place] [facility] or [facility] prescribing provider:  7/25/17   Future Office Visit:        Over 6 months since LOV. Per last A1C result patient was due for follow up in 3 months.  Medication is being filled for 1 time refill only due to:  Patient needs to be seen because for diabetes follow up appointment.   Stephanie Hinojosa RN                "

## 2018-01-26 NOTE — TELEPHONE ENCOUNTER
Pt returned our call . Can't make the appt today, but will call back to schedule it.  Kirsten Pisano, Clinic Receptionist

## 2018-01-26 NOTE — TELEPHONE ENCOUNTER
Attempt #1  Called [phone] - Left a non-detailed message to call back    If patient calls back, please schedule a FASTING PHYSICAL/DIABETES CHECK and close encounter    Ailyn Lopez RN  [facility]

## 2018-02-12 ENCOUNTER — OFFICE VISIT (OUTPATIENT)
Dept: FAMILY MEDICINE | Facility: CLINIC | Age: 68
End: 2018-02-12
Payer: COMMERCIAL

## 2018-02-12 VITALS
HEIGHT: 68 IN | WEIGHT: 238 LBS | BODY MASS INDEX: 36.07 KG/M2 | OXYGEN SATURATION: 95 % | TEMPERATURE: 98.9 F | HEART RATE: 83 BPM | SYSTOLIC BLOOD PRESSURE: 138 MMHG | DIASTOLIC BLOOD PRESSURE: 80 MMHG

## 2018-02-12 DIAGNOSIS — E11.42 DIABETIC POLYNEUROPATHY ASSOCIATED WITH TYPE 2 DIABETES MELLITUS (H): ICD-10-CM

## 2018-02-12 DIAGNOSIS — E78.5 HYPERLIPIDEMIA LDL GOAL <70: ICD-10-CM

## 2018-02-12 DIAGNOSIS — I10 HYPERTENSION GOAL BP (BLOOD PRESSURE) < 140/90: ICD-10-CM

## 2018-02-12 DIAGNOSIS — E11.9 TYPE 2 DIABETES MELLITUS WITHOUT COMPLICATION, WITHOUT LONG-TERM CURRENT USE OF INSULIN (H): Primary | ICD-10-CM

## 2018-02-12 DIAGNOSIS — E11.9 CONTROLLED TYPE 2 DIABETES MELLITUS WITHOUT COMPLICATION, WITHOUT LONG-TERM CURRENT USE OF INSULIN (H): ICD-10-CM

## 2018-02-12 DIAGNOSIS — E66.01 MORBID OBESITY (H): ICD-10-CM

## 2018-02-12 DIAGNOSIS — I10 ESSENTIAL HYPERTENSION WITH GOAL BLOOD PRESSURE LESS THAN 140/90: ICD-10-CM

## 2018-02-12 DIAGNOSIS — Z23 ENCOUNTER FOR IMMUNIZATION: ICD-10-CM

## 2018-02-12 LAB — HBA1C MFR BLD: 7.5 % (ref 4.3–6)

## 2018-02-12 PROCEDURE — 90471 IMMUNIZATION ADMIN: CPT | Performed by: FAMILY MEDICINE

## 2018-02-12 PROCEDURE — 36415 COLL VENOUS BLD VENIPUNCTURE: CPT | Performed by: FAMILY MEDICINE

## 2018-02-12 PROCEDURE — 90472 IMMUNIZATION ADMIN EACH ADD: CPT | Performed by: FAMILY MEDICINE

## 2018-02-12 PROCEDURE — 83036 HEMOGLOBIN GLYCOSYLATED A1C: CPT | Performed by: FAMILY MEDICINE

## 2018-02-12 PROCEDURE — 99214 OFFICE O/P EST MOD 30 MIN: CPT | Mod: 25 | Performed by: FAMILY MEDICINE

## 2018-02-12 PROCEDURE — 90662 IIV NO PRSV INCREASED AG IM: CPT | Performed by: FAMILY MEDICINE

## 2018-02-12 PROCEDURE — 90715 TDAP VACCINE 7 YRS/> IM: CPT | Performed by: FAMILY MEDICINE

## 2018-02-12 RX ORDER — SIMVASTATIN 40 MG
40 TABLET ORAL AT BEDTIME
Qty: 90 TABLET | Refills: 1 | Status: SHIPPED | OUTPATIENT
Start: 2018-02-12 | End: 2018-08-10

## 2018-02-12 RX ORDER — GLIMEPIRIDE 4 MG/1
4 TABLET ORAL
Qty: 90 TABLET | Refills: 1 | Status: SHIPPED | OUTPATIENT
Start: 2018-02-12 | End: 2018-08-10

## 2018-02-12 RX ORDER — LISINOPRIL 10 MG/1
10 TABLET ORAL DAILY
Qty: 90 TABLET | Refills: 1 | Status: SHIPPED | OUTPATIENT
Start: 2018-02-12 | End: 2018-05-16

## 2018-02-12 NOTE — MR AVS SNAPSHOT
"              After Visit Summary   2/12/2018    Francisco Thornton    MRN: [id number]           Patient Information     Date Of Birth          1950        Visit Information        Provider Department      2/12/2018 4:00 PM Geovany Perkins MD [facility]        Today's Diagnoses     Type 2 diabetes mellitus without complication, without long-term current use of insulin (H)    -  1    Hyperlipidemia LDL goal <70        Essential hypertension with goal blood pressure less than 140/90        Morbid obesity (H)        Controlled type 2 diabetes mellitus without complication, without long-term current use of insulin (H)        Hypertension goal BP (blood pressure) < 140/90        Diabetic polyneuropathy associated with type 2 diabetes mellitus (H)        Encounter for immunization           Follow-ups after your visit        Who to contact     If you have questions or need follow up information about today's clinic visit or your schedule please contact [facility] directly at [phone].  Normal or non-critical lab and imaging results will be communicated to you by MyChart, letter or phone within 4 business days after the clinic has received the results. If you do not hear from us within 7 days, please contact the clinic through [employer] or phone. If you have a critical or abnormal lab result, we will notify you by phone as soon as possible.  Submit refill requests through [employer] or call your pharmacy and they will forward the refill request to us. Please allow 3 business days for your refill to be completed.          Additional Information About Your Visit        MyChart Information     [employer] lets you send messages to your doctor, view your test results, renew your prescriptions, schedule appointments and more. To sign up, go to [email or web address] . Click on \"Log in\" on the left side of the screen, which will take you to the Welcome page. Then click on \"Sign up Now\" on the " "right side of the page.     You will be asked to enter the access code listed below, as well as some personal information. Please follow the directions to create your username and password.     Your access code is: [email or web address]  Expires: 2018  4:41 PM     Your access code will  in 90 days. If you need help or a new code, please call your [place] clinic or [phone].        Care EveryWhere ID     This is your Care EveryWhere ID. This could be used by other organizations to access your [place] medical records  [id number]        Your Vitals Were     Pulse Temperature Height Pulse Oximetry BMI (Body Mass Index)       83 98.9  F (37.2  C) (Oral) 5' 8\" (1.727 m) 95% 36.19 kg/m2        Blood Pressure from Last 3 Encounters:   18 138/80   17 120/78   16 138/72    Weight from Last 3 Encounters:   18 238 lb (108 kg)   17 240 lb (108.9 kg)   16 235 lb (106.6 kg)              We Performed the Following     HC FLU VACCINE, INCREASED ANTIGEN, PRESV FREE     Hemoglobin A1c     TDAP VACCINE (ADACEL)     VACCINE ADMINISTRATION, EACH ADDITIONAL     VACCINE ADMINISTRATION, INITIAL          Today's Medication Changes          These changes are accurate as of 18  4:41 PM.  If you have any questions, ask your nurse or doctor.               These medicines have changed or have updated prescriptions.        Dose/Directions    glimepiride 4 MG tablet   Commonly known as:  AMARYL   This may have changed:  See the new instructions.   Used for:  Type 2 diabetes mellitus without complication, without long-term current use of insulin (H)   Changed by:  Geovany Perkins MD        Dose:  4 mg   Take 1 tablet (4 mg) by mouth every morning (before breakfast)   Quantity:  90 tablet   Refills:  1       lisinopril 10 MG tablet   Commonly known as:  PRINIVIL/ZESTRIL   This may have changed:  See the new instructions.   Used for:  Essential hypertension with goal blood pressure less than 140/90 "   Changed by:  Geovany Perkins MD        Dose:  10 mg   Take 1 tablet (10 mg) by mouth daily   Quantity:  90 tablet   Refills:  1       metFORMIN 1000 MG tablet   Commonly known as:  GLUCOPHAGE   This may have changed:  See the new instructions.   Used for:  Type 2 diabetes mellitus without complication, without long-term current use of insulin (H)   Changed by:  Geovany Perkins MD        Dose:  1000 mg   Take 1 tablet (1,000 mg) by mouth 2 times daily (with meals)   Quantity:  180 tablet   Refills:  1       simvastatin 40 MG tablet   Commonly known as:  ZOCOR   This may have changed:  See the new instructions.   Used for:  Hyperlipidemia LDL goal <70   Changed by:  Geovany Perkins MD        Dose:  40 mg   Take 1 tablet (40 mg) by mouth At Bedtime   Quantity:  90 tablet   Refills:  1            Where to get your medicines      These medications were sent to [facility] [id number] [facility] - [place], [place] - [address]  [address], [place] [address]     Phone:  [phone]     glimepiride 4 MG tablet    lisinopril 10 MG tablet    metFORMIN 1000 MG tablet    simvastatin 40 MG tablet                Primary Care Provider Office Phone # Fax #    Geovany Perkins [phone]       [address] [place] [address]        Equal Access to Services     BRITT TOM AH: Hadii ravi ku hadasho Soomaali, waaxda luqadaha, qaybta kaalmada adeegyada, waxay idiin hayaan tere acevedo. So [facility] [phone].    ATENCIÓN: Si habla español, tiene a smith disposición servicios gratuitos de asistencia lingüística. [facility] [phone].    We comply with applicable federal civil rights laws and [place] laws. We do not discriminate on the basis of race, color, national origin, age, disability, sex, sexual orientation, or gender identity.            Thank you!     Thank you for choosing [facility]  for your care. Our goal is always to provide you with excellent care. Hearing back  from our patients is one way we can continue to improve our services. Please take a few minutes to complete the written survey that you may receive in the mail after your visit with us. Thank you!             Your Updated Medication List - Protect others around you: Learn how to safely use, store and throw away your medicines at www.disposemymeds.org.          This list is accurate as of 2/12/18  4:41 PM.  Always use your most recent med list.                   Brand Name Dispense Instructions for use Diagnosis    aspirin 325 MG tablet      take 325 mg by mouth as needed.        blood glucose lancets standard    no brand specified    100 each    Use to test blood sugar 1-2 times daily or as directed.    Controlled type 2 diabetes mellitus without complication, without long-term current use of insulin (H)       blood glucose monitoring lancets           * blood glucose monitoring meter device kit     1 kit    Use to test blood sugars 2 times daily or as directed.    Type 2 diabetes mellitus without complication (H)       * blood glucose monitoring meter device kit    no brand specified    1 kit    Use to test blood sugar 1-2 times daily or as directed.  With test strips and all needed suplies    Controlled type 2 diabetes mellitus without complication, without long-term current use of insulin (H)       blood glucose monitoring test strip    ACCU-CHEK SMARTVIEW    100 each    Use to test blood sugars 2x daily or as needed.    Type 2 diabetes mellitus without complication (H)       glimepiride 4 MG tablet    AMARYL    90 tablet    Take 1 tablet (4 mg) by mouth every morning (before breakfast)    Type 2 diabetes mellitus without complication, without long-term current use of insulin (H)       HYDROcodone-acetaminophen 5-325 MG per tablet    NORCO    30 tablet    Take 1 tablet by mouth every 6 hours as needed for moderate to severe pain    Spondylolisthesis of lumbar region       lisinopril 10 MG tablet     PRINIVIL/ZESTRIL    90 tablet    Take 1 tablet (10 mg) by mouth daily    Essential hypertension with goal blood pressure less than 140/90       metFORMIN 1000 MG tablet    GLUCOPHAGE    180 tablet    Take 1 tablet (1,000 mg) by mouth 2 times daily (with meals)    Type 2 diabetes mellitus without complication, without long-term current use of insulin (H)       simvastatin 40 MG tablet    ZOCOR    90 tablet    Take 1 tablet (40 mg) by mouth At Bedtime    Hyperlipidemia LDL goal <70       tadalafil 20 MG tablet    CIALIS    3 tablet    Take 0.5-1 tablets (10-20 mg) by mouth daily as needed for erectile dysfunction Never use with nitroglycerin, terazosin or doxazosin.    Erectile dysfunction       * Notice:  This list has 2 medication(s) that are the same as other medications prescribed for you. Read the directions carefully, and ask your doctor or other care provider to review them with you.

## 2018-02-12 NOTE — PROGRESS NOTES
SUBJECTIVE:                                                    Francisco Thornton is a 68 year old male who presents to clinic today for the following health issues:    Diabetes Follow-up    Patient is checking blood sugars: 2-3 times a week    Diabetic concerns: None-      Symptoms of hypoglycemia (low blood sugar): none     Paresthesias (numbness or burning in feet) or sores: Yes in the feet     Date of last diabetic eye exam: within last year    The patient is currently taking 1000 mg of metformin BID and 4 mg of glimepiride daily.    BP Readings from Last 2 Encounters:   02/12/18 138/80   07/25/17 120/78     Hemoglobin A1C (%)   Date Value   10/25/2017 7.4 (H)   07/19/2017 8.3 (H)     LDL Cholesterol Calculated (mg/dL)   Date Value   07/19/2017 90   06/30/2016 94     Hyperlipidemia Follow-Up    Rate your low fat/cholesterol diet?: fair    Taking statin?  Yes, no muscle aches from statin    Other lipid medications/supplements?:  None    The patient is currently taking 40 mg of simvastatin daily.     Hypertension Follow-up    Outpatient blood pressures are not being checked.    Low Salt Diet: low salt    The patient is currently taking 10 mg of lisinopril daily.     Heartburn - The patient states that he has frequent heartburn symptoms which he controls by taking TUMS. His symptoms are worsened by eating tomato sauce.         Problem list and histories reviewed & adjusted, as indicated.  Additional history: as documented      ROS:  Constitutional, HEENT, cardiovascular, pulmonary, GI, , musculoskeletal, neuro, skin, endocrine and psych systems are negative, except as otherwise noted.    This document serves as a record of the services and decisions personally performed and made by Geovany Perkins MD. It was created on his behalf by Carrie Cadena, a trained medical scribe. The creation of this document is based on the provider's statements to the medical scribe.  Carrie Cadena 4:32 PM 2/12/2018  OBJECTIVE:   "                                                  /80  Pulse 83  Temp 98.9  F (37.2  C) (Oral)  Ht 1.727 m (5' 8\")  Wt 108 kg (238 lb)  SpO2 95%  BMI 36.19 kg/m2 Body mass index is 36.19 kg/(m^2).   GENERAL: healthy, alert, well nourished, well hydrated, no distress  HENT: ear canals- normal; TMs- normal; Nose- normal; Mouth- no ulcers, no lesions  NECK: no tenderness, no adenopathy, no asymmetry, no masses, no stiffness; thyroid- normal to palpation  RESP: lungs clear to auscultation - no rales, no rhonchi, no wheezes  CV: 2/6 systolic murmur at the right upper sternal border, otherwise regular rates and rhythm, normal S1 S2, no S3 or S4 and no murmur, no click or rub -  ABDOMEN: soft, no tenderness, no  hepatosplenomegaly, no masses, normal bowel sounds  MS: extremities- no gross deformities noted, no edema  SKIN: no suspicious lesions, no rashes  Diabetic foot exam: normal DP and PT pulses, no trophic changes or ulcerative lesions and normal sensory exam    Diagnostic test results:  Pending     ASSESSMENT/PLAN:       Francisco was seen today for recheck.    Diagnoses and all orders for this visit:    Type 2 diabetes mellitus without complication, without long-term current use of insulin (H) - lab results pending - continue medication. Discussed diabetic diet.   -     metFORMIN (GLUCOPHAGE) 1000 MG tablet; Take 1 tablet (1,000 mg) by mouth 2 times daily (with meals)  -     glimepiride (AMARYL) 4 MG tablet; Take 1 tablet (4 mg) by mouth every morning (before breakfast)  -     Hemoglobin A1c    Hyperlipidemia LDL goal <70 - controlled - continue medication.  -     simvastatin (ZOCOR) 40 MG tablet; Take 1 tablet (40 mg) by mouth At Bedtime    Essential hypertension with goal blood pressure less than 140/90 - controlled - continue medication.  -     lisinopril (PRINIVIL/ZESTRIL) 10 MG tablet; Take 1 tablet (10 mg) by mouth daily    Morbid obesity (H) - Discussed diabetic diet.     Controlled type 2 diabetes " mellitus without complication, without long-term current use of insulin (H) - controlled - continue medication.    Hypertension goal BP (blood pressure) < 140/90 - controlled - continue medication.    Diabetic polyneuropathy associated with type 2 diabetes mellitus (H) - controlled - continue medication.    Encounter for immunization  -     VACCINE ADMINISTRATION, INITIAL  -     VACCINE ADMINISTRATION, EACH ADDITIONAL  -     HC FLU VACCINE, INCREASED ANTIGEN, PRESV FREE  -     TDAP VACCINE (ADACEL)        Risks, benefits and alternatives of treatments discussed. Plan agreed on.      Followup: 6 months    Will call, return to clinic, or go to ED if worsening or symptoms not improving as discussed.    See patient instructions.     The information in this document, created by a scribe for me, accurately reflects the services I personally performed and the decisions made by me. I have reviewed and approved this document for accuracy.      Dinh Perkins MD   Pager: [phone]

## 2018-02-12 NOTE — LETTER
[facility]  [address] [place] [id number]                  [phone]   February 13, 2018    Francisco Thornton  [address] [facility] [address]      Dear Francisco,    Here is a summary of your recent test results:    A1C (test of diabetes control the last 2-3 months) is slightly above your goal. Please continue with current plan and work on weight loss. Also, see me and recheck your A1C test in 6 months.     Your test results are enclosed.      Please contact me if you have any questions.    In addition, here is a list of due or overdue Health Maintenance reminders.    Health Maintenance Due   Topic Date Due     Colon Cancer Screening - every 10 years.  01/20/2002     FALL RISK ASSESSMENT  01/20/2017     Wellness Visit with your Primary Provider - yearly  07/12/2017     Eye Exam - yearly  09/28/2017       Please call us at [phone] (or use [facility]) to address the above recommendations.            Thank you very much for trusting [facility]..     Healthy regards,          Dinh Perkins M.D.        Results for orders placed or performed in visit on 02/12/18   Hemoglobin A1c   Result Value Ref Range    Hemoglobin A1C 7.5 (H) 4.3 - 6.0 %

## 2018-02-13 NOTE — PROGRESS NOTES
Note to Staff: please send a result letter    -A1C (test of diabetes control the last 2-3 months) is slightly above your goal. Please continue with current plan and work on weight loss. Also, see me and recheck your A1C test in 6 months.      For additional lab test information, labtestsonline.org is an excellent reference.

## 2018-04-24 ENCOUNTER — TRANSFERRED RECORDS (OUTPATIENT)
Dept: HEALTH INFORMATION MANAGEMENT | Facility: CLINIC | Age: 68
End: 2018-04-24

## 2018-04-24 LAB — RETINOPATHY: NEGATIVE

## 2018-05-16 DIAGNOSIS — I10 ESSENTIAL HYPERTENSION WITH GOAL BLOOD PRESSURE LESS THAN 140/90: ICD-10-CM

## 2018-05-16 RX ORDER — LISINOPRIL 10 MG/1
TABLET ORAL
Qty: 90 TABLET | Refills: 2 | Status: SHIPPED | OUTPATIENT
Start: 2018-05-16 | End: 2018-11-13

## 2018-05-16 NOTE — TELEPHONE ENCOUNTER
Prescription approved per [facility] Refill Protocol.      Cary Rodriguez, BS, RN, N  [facility]) [phone]

## 2018-05-16 NOTE — TELEPHONE ENCOUNTER
"Requested Prescriptions   Pending Prescriptions Disp Refills     lisinopril (PRINIVIL/ZESTRIL) 10 MG tablet [Pharmacy Med Name: LISINOPRIL 10 MG TABLET] 90 tablet 1      Last Written Prescription Date:  02/12/2018  Last Fill Quantity: 90,  # refills: 1   Last office visit: 2/12/2018  Sig: TAKE 1 TABLET (10 MG) BY MOUTH DAILY    ACE Inhibitors (Including Combos) Protocol Passed    5/16/2018  9:50 AM       Passed - Blood pressure under 140/90 in past 12 months    BP Readings from Last 3 Encounters:   02/12/18 138/80   07/25/17 120/78   07/12/16 138/72                Passed - Recent (12 mo) or future (30 days) visit within the authorizing provider's specialty    Patient had office visit in the last 12 months or has a visit in the next 30 days with authorizing provider or within the authorizing provider's specialty.  See \"Patient Info\" tab in inbasket, or \"Choose Columns\" in Meds & Orders section of the refill encounter.           Passed - Patient is age 18 or older       Passed - Normal serum creatinine on file in past 12 months    Recent Labs   Lab Test  07/19/17   0748   CR  0.95            Passed - Normal serum potassium on file in past 12 months    Recent Labs   Lab Test  07/19/17   0748   POTASSIUM  5.0               "

## 2018-08-02 DIAGNOSIS — E11.9 TYPE 2 DIABETES MELLITUS WITHOUT COMPLICATION (H): ICD-10-CM

## 2018-08-02 NOTE — TELEPHONE ENCOUNTER
"Requested Prescriptions   Pending Prescriptions Disp Refills     ONETOUCH ULTRA test strip [Pharmacy Med Name: ONE TOUCH ULTRA TEST STRIPS] 50 strip 0     Sig: TEST 1-2 TIMES DAILY AS DIRECTED    Diabetic Supplies Protocol Passed    8/2/2018 11:17 AM       Passed - Patient is 18 years of age or older       Passed - Recent (6 mo) or future (30 days) visit within the authorizing provider's specialty    Patient had office visit in the last 6 months or has a visit in the next 30 days with authorizing provider.  See \"Patient Info\" tab in inbasket, or \"Choose Columns\" in Meds & Orders section of the refill encounter.            Last Written Prescription Date:  n/a  Last Fill Quantity: n/a,  # refills: n/a   Last office visit: 2/12/2018 with prescribing provider:  2/12/2018   Future Office Visit:   Next 5 appointments (look out 90 days)     Aug 03, 2018  3:00 PM CDT   [facility]    [address] [place] [id number]   [phone]                 Prescription approved per [facility] Refill Protocol.  Arelis Hopkins RN  [facility]    "

## 2018-08-03 ENCOUNTER — OFFICE VISIT (OUTPATIENT)
Dept: FAMILY MEDICINE | Facility: CLINIC | Age: 68
End: 2018-08-03
Payer: COMMERCIAL

## 2018-08-03 VITALS
HEART RATE: 72 BPM | SYSTOLIC BLOOD PRESSURE: 124 MMHG | BODY MASS INDEX: 35.61 KG/M2 | TEMPERATURE: 98.2 F | HEIGHT: 68 IN | WEIGHT: 235 LBS | DIASTOLIC BLOOD PRESSURE: 64 MMHG | OXYGEN SATURATION: 95 %

## 2018-08-03 DIAGNOSIS — E11.65 TYPE 2 DIABETES MELLITUS WITH HYPERGLYCEMIA, WITHOUT LONG-TERM CURRENT USE OF INSULIN (H): Primary | ICD-10-CM

## 2018-08-03 DIAGNOSIS — E11.40 TYPE 2 DIABETES MELLITUS WITH DIABETIC NEUROPATHY, WITHOUT LONG-TERM CURRENT USE OF INSULIN (H): ICD-10-CM

## 2018-08-03 DIAGNOSIS — M25.562 ACUTE PAIN OF LEFT KNEE: ICD-10-CM

## 2018-08-03 DIAGNOSIS — M77.8 DELTOID TENDONITIS OF LEFT SHOULDER: ICD-10-CM

## 2018-08-03 DIAGNOSIS — M76.892 TENDINITIS INVOLVING LEFT HIP ABDUCTORS: ICD-10-CM

## 2018-08-03 PROCEDURE — 99214 OFFICE O/P EST MOD 30 MIN: CPT | Performed by: FAMILY MEDICINE

## 2018-08-03 RX ORDER — NAPROXEN 500 MG/1
500 TABLET ORAL 2 TIMES DAILY PRN
Qty: 60 TABLET | Refills: 0 | Status: SHIPPED | OUTPATIENT
Start: 2018-08-03 | End: 2018-08-03

## 2018-08-03 RX ORDER — NAPROXEN 500 MG/1
500 TABLET ORAL 2 TIMES DAILY PRN
Qty: 60 TABLET | Refills: 0 | Status: SHIPPED | OUTPATIENT
Start: 2018-08-03 | End: 2018-08-29

## 2018-08-03 NOTE — PATIENT INSTRUCTIONS
Hip Abduction (Strength)    1. Lie on your right side on the floor with your legs straight.  2. Raise your left leg about 6 to 8 inches. Keep your legs and hips straight. Don t roll back onto your hip. Hold for 5 seconds, then lower your leg.  3. Repeat 10 times, or as instructed.  4. Switch legs and repeat.     Challenge yourself  Put an elastic band or tubing around both ankles. Hold the band to the floor with your bottom ankle. Raise and lower your top leg slowly and steadily.   Date Last Reviewed: 3/10/2016    4728-8824 [employer]. [address] [place] [address]. All rights reserved. This information is not intended as a substitute for professional medical care. Always follow your healthcare professional's instructions.        Hip Abduction with External Rotation (Strength)    These instructions are for your right knee. Switch sides for your left  knee.  1. Get down on the floor on your hands and knees.  2. Lift your right leg up and out to the side. Keep the knee bent. Raise the leg as high as is comfortable. Hold for 3 seconds.  3. Slowly lower your leg back to the floor.  4. Repeat 5 times, or as instructed.  Date Last Reviewed: 3/10/2016    8920-5178 [employer]. [address] [place] [address]. All rights reserved. This information is not intended as a substitute for professional medical care. Always follow your healthcare professional's instructions.        Side Lying Hip Abduction (Strength)    1. Lie down on the floor on your side. Rest your head on your arm. Bend your legs at the knees.  2. Keep your feet together and lift your top leg up so that your knees are . Keep your hips steady.     3. Slowly lower your leg back down.  4. Repeat 10 times, or as instructed.  5. Switch sides if instructed.     Challenge yourself  Put an elastic band or tubing around your thighs. Raise and lower your top leg slowly and steadily.      Date Last Reviewed:  3/29/2016    6023-7349 [employer]. [address] [place] [address], [place] [address]. All rights reserved. This information is not intended as a substitute for professional medical care. Always follow your healthcare professional's instructions.             Trochanteric Bursitis Rehabilitation Exercises   You can begin stretching the muscles that run along the outside of your hip using the first two exercise. You can do the strengthening exercises when the sharp pain lessens.   Stretching exercises     Piriformis stretch: Lying on your back with both knees bent, rest the ankle of your injured leg over the knee of your uninjured leg. Grasp the thigh of your uninjured leg and pull that knee toward your chest. You will feel a stretch along the buttocks and possibly along the outside of your hip on the injured side. Hold this for 15 to 30 seconds. Repeat 3 times.     Iliotibial band stretch (standing): Cross your uninjured leg in front of your injured leg and bend down and touch your toes. You can move your hands across the floor toward the uninjured side and you will feel more stretch on the outside of your thigh on the injured side. Hold this position for 15 to 30 seconds. Return to the starting position. Repeat 3 times.   Strengthening exercises     Straight leg raise: Lie on your back with your legs straight out in front of you. Tighten up the top of your thigh muscle on the injured leg and lift that leg about 8 inches off the floor, keeping the thigh muscle tight throughout. Slowly lower your leg back down to the floor. Do 3 sets of 10.     Wall squat with a ball: Stand with your back, shoulders, and head against a wall and look straight ahead. Keep your shoulders relaxed and your feet 1 foot away from the wall and a shoulder's width apart. Place a rolled up pillow or a soccer-sized ball between your thighs. Keeping your head against the wall, slowly squat while squeezing the pillow or ball at the same time.  Squat down until you are almost in a sitting position. Your thighs will not yet be parallel to the floor. Hold this position for 10 seconds and then slowly slide back up the wall. Make sure you keep squeezing the pillow or ball throughout this exercise. Repeat 10 times. Build up to 3 sets of 10.     Prone hip extension: Lie on your stomach with your legs straight out behind you. Tighten up your buttocks muscles and lift one leg off the floor about 8 inches. Keep your knee straight. Hold for 5 seconds. Then lower your leg and relax. Do 3 sets of 10.   Written by Courtney Bowers M.S., P.T., for [employer].   Published by [employer].   This content is reviewed periodically and is subject to change as new health information becomes available. The information is intended to inform and educate and is not a replacement for medical evaluation, advice, diagnosis or treatment by a healthcare professional.   Sports Medicine Advisor 2003.1 Index  Sports Medicine Advisor 2003.1 Credits   Copyright   2003 [employer]. All rights reserved.   Page footer image                Exercises for Shoulder Flexibility: External Rotation    This stretch can help restore shoulder flexibility and relieve pain over time. When stretching, be sure to breathe deeply. Follow any special instructions from your doctor or physical therapist:  1.  a doorway. Grasp the doorjamb with the hand on the frozen side. Your arm should be bent.  2. With the other hand, hold the elbow on the frozen side firmly against your body.  3. Standing in the same spot, rotate your body away from the doorjamb. Stop when you feel the stretch in the shoulder. At first, try to hold the stretch for 5 seconds.  4. Work up to doing 3 sets of this stretch, 3 times a day. Work up to holding the stretch for 30 to 60 seconds.  Note: Keep your arms as still as you can. Over time, rotate your body a little more to enhance the  stretch. But be careful not to twist your back.  Frozen shoulder  Frozen shoulder is another name for adhesive capsulitis, which causes restricted movement in the shoulder. If you have frozen shoulder, this stretch may cause discomfort, especially when you first get started. A few months may pass before you achieve the results you want. But once your shoulder heals, it rarely becomes frozen again. So stick to your stretching program. If you have any questions, be sure to ask your doctor.   Date Last Reviewed: 8/16/2015 2000-2017 [employer]. [address] [place] [address]. All rights reserved. This information is not intended as a substitute for professional medical care. Always follow your healthcare professional's instructions.        Exercises for Shoulder Flexibility: Internal Rotation    This stretch can help restore shoulder flexibility and relieve pain over time. When stretching, be sure to breathe deeply. Follow any special instructions from your healthcare provider or physical therapist.  1. While seated, move the arm on the side you want to stretch toward the middle of your back. The palm of your hand should face out.  2. Cup your other hand under the hand that s behind your back. Gently push your cupped hand upward until you feel the stretch in the shoulder. Try to hold the stretch for 5 seconds.  3. Work up to doing 3 sets of this stretch, 3 times a day. Work up to holding the stretch for 30 to 60 seconds.  Note: Keep your back straight. It s OK if your hand can t reach the middle of your back. Instead, start the stretch with your hand as close as you can get it to the middle of your back.  Frozen shoulder  Frozen shoulder is another name for adhesive capsulitis. This causes restricted movement in the shoulder. If you have frozen shoulder, this stretch may cause discomfort, especially when you first get started. A few months may pass before you achieve the results you want.  But once your shoulder heals, it rarely becomes frozen again. So stick to your stretching program. If you have any questions, be sure to ask your healthcare provider.   Date Last Reviewed: 12/1/2017 2000-2017 [employer]. [address] [place] [address]. All rights reserved. This information is not intended as a substitute for professional medical care. Always follow your healthcare professional's instructions.        Exercises for Shoulder Flexibility: Adduction (Reaching Across)    This stretch can help restore shoulder flexibility and relieve pain over time. When stretching, be sure to breathe deeply. And follow any special instructions from your doctor or physical therapist:  1. Put the hand from the side you want to stretch on your opposite shoulder. Your elbow should point away from your body. Try to raise your elbow as close to shoulder height as you can.  2. With your other hand, push the raised elbow toward the opposite shoulder. Avoid turning your head. Stop when you feel the stretch. Try to hold the stretch for 5 seconds.  3. Work up to doing 3 sets of this stretch, 3 times a day. Work up to holding the stretch for 30 to 60 seconds.  Note: Be sure to push your elbow across your chest, not up toward your chin. Over time, try to push your elbow farther across your chest to enhance the stretch.  Frozen shoulder  Frozen shoulder is another name for adhesive capsulitis, which causes restricted movement in the shoulder. If you have frozen shoulder, this stretch may cause discomfort, especially when you first get started. A few months may pass before you achieve the results you want. Once your shoulder heals, it rarely becomes frozen again. So stick to your stretching program. If you have any questions, be sure to ask your doctor.   Date Last Reviewed: 8/16/2015 2000-2017 [employer]. [address] [place] [id number]. All rights reserved. This information is  not intended as a substitute for professional medical care. Always follow your healthcare professional's instructions.        Exercises for Shoulder Flexibility: Wall Walk    Improving your flexibility can reduce pain. Stretching exercises also can help increase your range of pain-free motion. Breathe normally when you exercise. Use smooth, fluid movements.  Note: Follow any special instructions you are given. If you feel pain, stop the exercise. If the pain continues after stopping, call your healthcare provider:    Stand with your shoulder about 2 feet from the wall.    Raise your arm to shoulder level and gently  walk  your fingers up the wall as high as you can.    Hold for a few seconds. Then walk your fingers back down.    Repeat 3 times. Move closer to the wall as you repeat.    Build up to holding each stretch for 30 seconds.  Caution: Do this stretch only if your healthcare provider recommends it. Don t do it when you are first injured.       Date Last Reviewed: 8/16/2015 2000-2017 [employer]. [address] [place] [address]. All rights reserved. This information is not intended as a substitute for professional medical care. Always follow your healthcare professional's instructions.                 Thank you for choosing [facility]  for your Health Care. It was a pleasure seeing you at your visit today. Please contact us with any questions or concerns you may have.                   Neelam Dooley MD                                  To reach your [facility] care team after hours call:   [phone]    Our clinic hours are:     Monday- 7:30 am - 7:00 pm                             Tuesday through Friday- 7:30 am - 5:00 pm                                        Saturday- 8:00 am - 12:00 pm                  Phone:  [phone]    Our pharmacy hours are:     Monday  8:00 am to 7:00 pm      Tuesday through Friday 8:00am to 6:00pm                         Saturday - 9:00 am to 1:00 pm      Sunday : Closed.              Phone:  [phone]      There is also information available at our web site:  [email or web address]    If your provider ordered any lab tests and you do not receive the results within 10 business days, please call the clinic.    If you need a medication refill please contact your pharmacy.  Please allow 2 business days for your refill to be completed.    Our clinic offers telephone visits and e visits.  Please ask one of your team members to explain more.      Use [employer] (secure email communication and access to your chart) to send your primary care provider a message or make an appointment. Ask someone on your Team how to sign up for [employer].

## 2018-08-03 NOTE — MR AVS SNAPSHOT
After Visit Summary   8/3/2018    Francisco Thornton    MRN: [id number]           Patient Information     Date Of Birth          1950        Visit Information        Provider Department      8/3/2018 3:00 PM Neelam Dooley MD [facility] [place]        Today's Diagnoses     Type 2 diabetes mellitus with hyperglycemia, without long-term current use of insulin (H)    -  1    Type 2 diabetes mellitus with diabetic neuropathy, without long-term current use of insulin (H)        Tendinitis involving left hip abductors        Acute pain of left knee          Care Instructions      Hip Abduction (Strength)    1. Lie on your right side on the floor with your legs straight.  2. Raise your left leg about 6 to 8 inches. Keep your legs and hips straight. Don t roll back onto your hip. Hold for 5 seconds, then lower your leg.  3. Repeat 10 times, or as instructed.  4. Switch legs and repeat.     Challenge yourself  Put an elastic band or tubing around both ankles. Hold the band to the floor with your bottom ankle. Raise and lower your top leg slowly and steadily.   Date Last Reviewed: 3/10/2016    2797-7605 [employer]. [address] [place] [address]. All rights reserved. This information is not intended as a substitute for professional medical care. Always follow your healthcare professional's instructions.        Hip Abduction with External Rotation (Strength)    These instructions are for your right knee. Switch sides for your left  knee.  1. Get down on the floor on your hands and knees.  2. Lift your right leg up and out to the side. Keep the knee bent. Raise the leg as high as is comfortable. Hold for 3 seconds.  3. Slowly lower your leg back to the floor.  4. Repeat 5 times, or as instructed.  Date Last Reviewed: 3/10/2016    6079-2770 [employer]. [address] [place] [address]. All rights reserved. This information is not intended as a  substitute for professional medical care. Always follow your healthcare professional's instructions.        Side Lying Hip Abduction (Strength)    1. Lie down on the floor on your side. Rest your head on your arm. Bend your legs at the knees.  2. Keep your feet together and lift your top leg up so that your knees are . Keep your hips steady.     3. Slowly lower your leg back down.  4. Repeat 10 times, or as instructed.  5. Switch sides if instructed.     Challenge yourself  Put an elastic band or tubing around your thighs. Raise and lower your top leg slowly and steadily.      Date Last Reviewed: 3/29/2016    1746-0518 [employer]. [address] [place] [address]. All rights reserved. This information is not intended as a substitute for professional medical care. Always follow your healthcare professional's instructions.             Trochanteric Bursitis Rehabilitation Exercises   You can begin stretching the muscles that run along the outside of your hip using the first two exercise. You can do the strengthening exercises when the sharp pain lessens.   Stretching exercises     Piriformis stretch: Lying on your back with both knees bent, rest the ankle of your injured leg over the knee of your uninjured leg. Grasp the thigh of your uninjured leg and pull that knee toward your chest. You will feel a stretch along the buttocks and possibly along the outside of your hip on the injured side. Hold this for 15 to 30 seconds. Repeat 3 times.     Iliotibial band stretch (standing): Cross your uninjured leg in front of your injured leg and bend down and touch your toes. You can move your hands across the floor toward the uninjured side and you will feel more stretch on the outside of your thigh on the injured side. Hold this position for 15 to 30 seconds. Return to the starting position. Repeat 3 times.   Strengthening exercises     Straight leg raise: Lie on your back with your legs straight  out in front of you. Tighten up the top of your thigh muscle on the injured leg and lift that leg about 8 inches off the floor, keeping the thigh muscle tight throughout. Slowly lower your leg back down to the floor. Do 3 sets of 10.     Wall squat with a ball: Stand with your back, shoulders, and head against a wall and look straight ahead. Keep your shoulders relaxed and your feet 1 foot away from the wall and a shoulder's width apart. Place a rolled up pillow or a soccer-sized ball between your thighs. Keeping your head against the wall, slowly squat while squeezing the pillow or ball at the same time. Squat down until you are almost in a sitting position. Your thighs will not yet be parallel to the floor. Hold this position for 10 seconds and then slowly slide back up the wall. Make sure you keep squeezing the pillow or ball throughout this exercise. Repeat 10 times. Build up to 3 sets of 10.     Prone hip extension: Lie on your stomach with your legs straight out behind you. Tighten up your buttocks muscles and lift one leg off the floor about 8 inches. Keep your knee straight. Hold for 5 seconds. Then lower your leg and relax. Do 3 sets of 10.   Written by Courtney Bowers M.S., P.T., for [employer].   Published by [employer].   This content is reviewed periodically and is subject to change as new health information becomes available. The information is intended to inform and educate and is not a replacement for medical evaluation, advice, diagnosis or treatment by a healthcare professional.   Sports Medicine Advisor 2003.1 Index  Sports Medicine Advisor 2003.1 Credits   Copyright   2003 [employer]. All rights reserved.   Page footer image                Exercises for Shoulder Flexibility: External Rotation    This stretch can help restore shoulder flexibility and relieve pain over time. When stretching, be sure to breathe deeply. Follow any special  instructions from your doctor or physical therapist:  1.  a doorway. Grasp the doorjamb with the hand on the frozen side. Your arm should be bent.  2. With the other hand, hold the elbow on the frozen side firmly against your body.  3. Standing in the same spot, rotate your body away from the doorjamb. Stop when you feel the stretch in the shoulder. At first, try to hold the stretch for 5 seconds.  4. Work up to doing 3 sets of this stretch, 3 times a day. Work up to holding the stretch for 30 to 60 seconds.  Note: Keep your arms as still as you can. Over time, rotate your body a little more to enhance the stretch. But be careful not to twist your back.  Frozen shoulder  Frozen shoulder is another name for adhesive capsulitis, which causes restricted movement in the shoulder. If you have frozen shoulder, this stretch may cause discomfort, especially when you first get started. A few months may pass before you achieve the results you want. But once your shoulder heals, it rarely becomes frozen again. So stick to your stretching program. If you have any questions, be sure to ask your doctor.   Date Last Reviewed: 8/16/2015 2000-2017 [employer]. [address] [place] [address]. All rights reserved. This information is not intended as a substitute for professional medical care. Always follow your healthcare professional's instructions.        Exercises for Shoulder Flexibility: Internal Rotation    This stretch can help restore shoulder flexibility and relieve pain over time. When stretching, be sure to breathe deeply. Follow any special instructions from your healthcare provider or physical therapist.  1. While seated, move the arm on the side you want to stretch toward the middle of your back. The palm of your hand should face out.  2. Cup your other hand under the hand that s behind your back. Gently push your cupped hand upward until you feel the stretch in the shoulder. Try to  hold the stretch for 5 seconds.  3. Work up to doing 3 sets of this stretch, 3 times a day. Work up to holding the stretch for 30 to 60 seconds.  Note: Keep your back straight. It s OK if your hand can t reach the middle of your back. Instead, start the stretch with your hand as close as you can get it to the middle of your back.  Frozen shoulder  Frozen shoulder is another name for adhesive capsulitis. This causes restricted movement in the shoulder. If you have frozen shoulder, this stretch may cause discomfort, especially when you first get started. A few months may pass before you achieve the results you want. But once your shoulder heals, it rarely becomes frozen again. So stick to your stretching program. If you have any questions, be sure to ask your healthcare provider.   Date Last Reviewed: 12/1/2017 2000-2017 [employer]. [address] [place] [address]. All rights reserved. This information is not intended as a substitute for professional medical care. Always follow your healthcare professional's instructions.        Exercises for Shoulder Flexibility: Adduction (Reaching Across)    This stretch can help restore shoulder flexibility and relieve pain over time. When stretching, be sure to breathe deeply. And follow any special instructions from your doctor or physical therapist:  1. Put the hand from the side you want to stretch on your opposite shoulder. Your elbow should point away from your body. Try to raise your elbow as close to shoulder height as you can.  2. With your other hand, push the raised elbow toward the opposite shoulder. Avoid turning your head. Stop when you feel the stretch. Try to hold the stretch for 5 seconds.  3. Work up to doing 3 sets of this stretch, 3 times a day. Work up to holding the stretch for 30 to 60 seconds.  Note: Be sure to push your elbow across your chest, not up toward your chin. Over time, try to push your elbow farther across your chest  to enhance the stretch.  Frozen shoulder  Frozen shoulder is another name for adhesive capsulitis, which causes restricted movement in the shoulder. If you have frozen shoulder, this stretch may cause discomfort, especially when you first get started. A few months may pass before you achieve the results you want. Once your shoulder heals, it rarely becomes frozen again. So stick to your stretching program. If you have any questions, be sure to ask your doctor.   Date Last Reviewed: 8/16/2015 2000-2017 [employer]. [address] [place] [address]. All rights reserved. This information is not intended as a substitute for professional medical care. Always follow your healthcare professional's instructions.        Exercises for Shoulder Flexibility: Wall Walk    Improving your flexibility can reduce pain. Stretching exercises also can help increase your range of pain-free motion. Breathe normally when you exercise. Use smooth, fluid movements.  Note: Follow any special instructions you are given. If you feel pain, stop the exercise. If the pain continues after stopping, call your healthcare provider:    Stand with your shoulder about 2 feet from the wall.    Raise your arm to shoulder level and gently  walk  your fingers up the wall as high as you can.    Hold for a few seconds. Then walk your fingers back down.    Repeat 3 times. Move closer to the wall as you repeat.    Build up to holding each stretch for 30 seconds.  Caution: Do this stretch only if your healthcare provider recommends it. Don t do it when you are first injured.       Date Last Reviewed: 8/16/2015 2000-2017 [employer]. [address] [place] [address]. All rights reserved. This information is not intended as a substitute for professional medical care. Always follow your healthcare professional's instructions.                 Thank you for choosing [facility]  for your Health  Care. It was a pleasure seeing you at your visit today. Please contact us with any questions or concerns you may have.                   Neelam Dooley MD                                  To reach your [facility] team after hours call:   [phone]    Our clinic hours are:     Monday- 7:30 am - 7:00 pm                             Tuesday through Friday- 7:30 am - 5:00 pm                                        Saturday- 8:00 am - 12:00 pm                  Phone:  [phone]    Our pharmacy hours are:     Monday  8:00 am to 7:00 pm      Tuesday through Friday 8:00am to 6:00pm                        Saturday - 9:00 am to 1:00 pm      Sunday : Closed.              Phone:  [phone]      There is also information available at our web site:  [email or web address]    If your provider ordered any lab tests and you do not receive the results within 10 business days, please call the clinic.    If you need a medication refill please contact your pharmacy.  Please allow 2 business days for your refill to be completed.    Our clinic offers telephone visits and e visits.  Please ask one of your team members to explain more.      Use [employer] (secure email communication and access to your chart) to send your primary care provider a message or make an appointment. Ask someone on your Team how to sign up for [employer].                       Follow-ups after your visit        Additional Services     DIABETES EDUCATOR REFERRAL       Your provider has referred you to Diabetes Education: Yen Lawson, CDE - [phone]    This is a Previous Diagnosis: Follow-up DSMT - 2 hours.  Type of diabetes is Type 2 - On Oral Medication   Medicare covers: 10 hours of initial DSMT in 12 month period from the time of first visit, plus 2 hours of follow-up DSMT annually, and additional hours as requested for insulin training.         Diabetes Co-Morbidities: dyslipidemia, hypertension and obesity               A1C Goal:   <8.0       A1C is: Lab Results       Component                Value               Date                       A1C                      7.5                 02/12/2018              MEDICAL NUTRITION THERAPY (MNT) for Diabetes    Medical Nutrition Therapy with a Registered Dietitian can be provided in coordination with Diabetes Self-Management Training to assist in achieving optimal diabetes management.    MNT Type and Hours: New diagnosis: Initial MNT - 3 hours                       Medicare will cover: 3 hours initial MNT in 12 month period after first visit, plus 2 hours of follow-up MNT annually                                                          A1C is: Lab Results       Component                Value               Date                       A1C                      7.5                 02/12/2018            If an urgent visit is needed or A1C is above 12, Care Team to call the diabetes education team at [phone] or send a message to the diabetes education pool (P DIAB ED-PATIENT CARE).    Diabetes education focus: Continuous Glucose Monitor: Diagnostic CGM (minimum of 72 hours)      Education needs: Additional DSMT hours requested: 2 hours                                                                                                                                                      Please be aware that coverage of these services is subject to the terms and limitations of your health insurance plan.  Call member services at your health plan to determine Diabetes Self-Management Training benefits and ask which blood glucose monitor brands are covered by your plan.      Please bring the following to your appointment:    -   List of current medications   -   List of blood glucose monitor brands that are covered by your insurance plan  -   Blood glucose monitor and log book  -   Food records for the 3 days prior to your visit            ERNIE PT, HAND, AND CHIROPRACTIC REFERRAL       **This order will print  in the [facility] Scheduling Office**    Physical Therapy, Hand Therapy and Chiropractic Care are available through:    *[facility]  *[facility]  *[facility]    Call one number to schedule at any of the above locations: [phone].    Your provider has referred you to: Physical Therapy at [facility] or [facility]    Indication/Reason for Referral: Hip Pain and knee pain   Onset of Illness: 1-2 months   Therapy Orders: Evaluate and Treat  Special Programs: None   Special Request: none     Shanice Prasad      Additional Comments for the Therapist or Chiropractor: none.     Please be aware that coverage of these services is subject to the terms and limitations of your health insurance plan.  Call member services at your health plan with any benefit or coverage questions.      Please bring the following to your appointment:    *Your personal calendar for scheduling future appointments  *Comfortable clothing            ORTHO  REFERRAL       [facility] is referring you to the Orthopedic  Services at [facility].       The  Representative will assist you in the coordination of your Orthopedic and Musculoskeletal Care as prescribed by your physician.    The  Representative will call you within 1 business day to help schedule your appointment, or you may contact the  Representative at:    All areas ~ [phone]     Type of Referral : Non Surgical       Timeframe requested: 3 - 5 days    Coverage of these services is subject to the terms and limitations of your health insurance plan.  Please call member services at your health plan with any benefit or coverage questions.      If X-rays, CT or MRI's have been performed, please contact the facility where they were done to arrange for , prior to your scheduled appointment.  Please bring this referral request to your appointment and present it to  "your specialist.                  Who to contact     If you have questions or need follow up information about today's clinic visit or your schedule please contact [facility] [place] directly at [phone].  Normal or non-critical lab and imaging results will be communicated to you by MyChart, letter or phone within 4 business days after the clinic has received the results. If you do not hear from us within 7 days, please contact the clinic through MyChart or phone. If you have a critical or abnormal lab result, we will notify you by phone as soon as possible.  Submit refill requests through [employer] or call your pharmacy and they will forward the refill request to us. Please allow 3 business days for your refill to be completed.          Additional Information About Your Visit        [facility] Information     [employer] lets you send messages to your doctor, view your test results, renew your prescriptions, schedule appointments and more. To sign up, go to [email or web address] . Click on \"Log in\" on the left side of the screen, which will take you to the Welcome page. Then click on \"Sign up Now\" on the right side of the page.     You will be asked to enter the access code listed below, as well as some personal information. Please follow the directions to create your username and password.     Your access code is: [email or web address]  Expires: 2018  4:20 PM     Your access code will  in 90 days. If you need help or a new code, please call your [place] clinic or [phone].        Care EveryWhere ID     This is your Care EveryWhere ID. This could be used by other organizations to access your [place] medical records  [id number]        Your Vitals Were     Pulse Temperature Height Pulse Oximetry BMI (Body Mass Index)       72 98.2  F (36.8  C) (Oral) 5' 8\" (1.727 m) 95% 35.73 kg/m2        Blood Pressure from Last 3 Encounters:   18 124/64   18 138/80   17 120/78    Weight from Last 3 " Encounters:   08/03/18 235 lb (106.6 kg)   02/12/18 238 lb (108 kg)   07/25/17 240 lb (108.9 kg)              We Performed the Following     DIABETES EDUCATOR REFERRAL     ERNIE PT, HAND, AND CHIROPRACTIC REFERRAL     ORTHO  REFERRAL        Primary Care Provider Office Phone # Fax #    Geovany Perkins [phone]       [address] [facility] [address]        Equal Access to Services     SONAL TOM : Hadii aad ku hadasho Soomaali, waaxda luqadaha, qaybta kaalmada adeegyada, waxay idiin hayaan adeeg kharash la'mirandan . So [facility] [phone].    ATENCIÓN: Si habla español, tiene a smith disposición servicios gratuitos de asistencia lingüística. [facility] [phone].    We comply with applicable federal civil rights laws and [place] laws. We do not discriminate on the basis of race, color, national origin, age, disability, sex, sexual orientation, or gender identity.            Thank you!     Thank you for choosing [facility]  for your care. Our goal is always to provide you with excellent care. Hearing back from our patients is one way we can continue to improve our services. Please take a few minutes to complete the written survey that you may receive in the mail after your visit with us. Thank you!             Your Updated Medication List - Protect others around you: Learn how to safely use, store and throw away your medicines at www.disposemymeds.org.          This list is accurate as of 8/3/18  4:22 PM.  Always use your most recent med list.                   Brand Name Dispense Instructions for use Diagnosis    aspirin 325 MG tablet      take 325 mg by mouth as needed.        blood glucose lancets standard    no brand specified    100 each    Use to test blood sugar 1-2 times daily or as directed.    Controlled type 2 diabetes mellitus without complication, without long-term current use of insulin (H)       blood glucose monitoring lancets           * blood  glucose monitoring meter device kit     1 kit    Use to test blood sugars 2 times daily or as directed.    Type 2 diabetes mellitus without complication (H)       * blood glucose monitoring meter device kit    no brand specified    1 kit    Use to test blood sugar 1-2 times daily or as directed.  With test strips and all needed suplies    Controlled type 2 diabetes mellitus without complication, without long-term current use of insulin (H)       glimepiride 4 MG tablet    AMARYL    90 tablet    Take 1 tablet (4 mg) by mouth every morning (before breakfast)    Type 2 diabetes mellitus without complication, without long-term current use of insulin (H)       HYDROcodone-acetaminophen 5-325 MG per tablet    NORCO    30 tablet    Take 1 tablet by mouth every 6 hours as needed for moderate to severe pain    Spondylolisthesis of lumbar region       lisinopril 10 MG tablet    PRINIVIL/ZESTRIL    90 tablet    TAKE 1 TABLET (10 MG) BY MOUTH DAILY    Essential hypertension with goal blood pressure less than 140/90       metFORMIN 1000 MG tablet    GLUCOPHAGE    180 tablet    Take 1 tablet (1,000 mg) by mouth 2 times daily (with meals)    Type 2 diabetes mellitus without complication, without long-term current use of insulin (H)       ONETOUCH ULTRA test strip   Generic drug:  blood glucose monitoring     50 strip    TEST 1-2 TIMES DAILY AS DIRECTED    Type 2 diabetes mellitus without complication (H)       simvastatin 40 MG tablet    ZOCOR    90 tablet    Take 1 tablet (40 mg) by mouth At Bedtime    Hyperlipidemia LDL goal <70       tadalafil 20 MG tablet    CIALIS    3 tablet    Take 0.5-1 tablets (10-20 mg) by mouth daily as needed for erectile dysfunction Never use with nitroglycerin, terazosin or doxazosin.    Erectile dysfunction       * Notice:  This list has 2 medication(s) that are the same as other medications prescribed for you. Read the directions carefully, and ask your doctor or other care provider to review them  with you.

## 2018-08-03 NOTE — PROGRESS NOTES
SUBJECTIVE:                                                    Francisco Thornton is a 68 year old male who presents to clinic today for the following health issues:    Joint Pain:     Onset: x1 month    Description:   Location: left leg   Character: Sharp pain when moves leg out to the side - intermittient, Burning -outer knee - constant x2 weeks was intermittent --     Intensity: moderate    Progression of Symptoms: worse - burning sensation on lateral side of his left  Knee that came and went - and now it won't go away.      Accompanying Signs & Symptoms:  Other symptoms: radiation of pain to entire leg starts from the left groin area , then  numbness of knee, and weakness of leg-cannot hold weight when moves leg to the side.   No hx of low back issues.     History:   Previous similar pain: no       Precipitating factors: abduction type of movement, especially getting out of the car on 's side.   Trauma or overuse: no   Doesn't hurt with normal walking or weight bearing.     Alleviating factors:  Improved by: Elevating the left leg and stretching out the hamstring.     Therapies Tried and outcome: above    Getting some left shoulder pain  -hurts with lateral movement - no radiation -worse deltoid. Again no known injury.   Builds car wash equipment .     BG's in the am's 160 fasting.   Lab Results   Component Value Date    A1C 7.5 02/12/2018    A1C 7.4 10/25/2017    A1C 8.3 07/19/2017    A1C 6.4 06/30/2016    A1C 7.3 07/28/2015     Hasn't done diabetes education for quite some time.   Wt Readings from Last 5 Encounters:   08/03/18 235 lb (106.6 kg)   02/12/18 238 lb (108 kg)   07/25/17 240 lb (108.9 kg)   07/12/16 235 lb (106.6 kg)   01/07/16 237 lb (107.5 kg)         Patient Active Problem List   Diagnosis     Advanced directives, counseling/discussion     BPH (benign prostatic hyperplasia)     Controlled type 2 diabetes mellitus without complication, without long-term current use of insulin (H)      Spondylolisthesis of lumbar region     Degeneration of lumbar or lumbosacral intervertebral disc     Morbid obesity (H)     Hyperlipidemia LDL goal <70     Hypertension goal BP (blood pressure) < 140/90     Diabetic polyneuropathy associated with type 2 diabetes mellitus (H)       Current Outpatient Prescriptions   Medication Sig Dispense Refill     aspirin 325 MG tablet take 325 mg by mouth as needed.       blood glucose (NO BRAND SPECIFIED) lancets standard Use to test blood sugar 1-2 times daily or as directed. 100 each 11     blood glucose monitoring (ACCU-CHEK JERAD PLUS) meter device kit Use to test blood sugars 2 times daily or as directed. 1 kit 0     blood glucose monitoring (ACCU-CHEK FASTCLIX) lancets   6     blood glucose monitoring (NO BRAND SPECIFIED) meter device kit Use to test blood sugar 1-2 times daily or as directed.  With test strips and all needed suplies 1 kit 0     glimepiride (AMARYL) 4 MG tablet Take 1 tablet (4 mg) by mouth every morning (before breakfast) 90 tablet 1     HYDROcodone-acetaminophen (NORCO) 5-325 MG per tablet Take 1 tablet by mouth every 6 hours as needed for moderate to severe pain 30 tablet 0     lisinopril (PRINIVIL/ZESTRIL) 10 MG tablet TAKE 1 TABLET (10 MG) BY MOUTH DAILY 90 tablet 2     metFORMIN (GLUCOPHAGE) 1000 MG tablet Take 1 tablet (1,000 mg) by mouth 2 times daily (with meals) 180 tablet 1     ONETOUCH ULTRA test strip TEST 1-2 TIMES DAILY AS DIRECTED 50 strip 0     simvastatin (ZOCOR) 40 MG tablet Take 1 tablet (40 mg) by mouth At Bedtime 90 tablet 1     tadalafil (CIALIS) 20 MG tablet Take 0.5-1 tablets (10-20 mg) by mouth daily as needed for erectile dysfunction Never use with nitroglycerin, terazosin or doxazosin. 3 tablet 0        No Known Allergies    GFR Estimate   Date Value Ref Range Status   07/19/2017 79 >60 mL/min/1.7m2 Final     Comment:     Non  GFR Calc   12/30/2015 89 >60 mL/min/1.7m2 Final     Comment:     Non   "GFR Calc   07/28/2015 >90  Non  GFR Calc   >60 mL/min/1.7m2 Final          Problem list and histories reviewed & adjusted, as indicated.  Additional history: as documented    Reviewed and updated as needed this visit by clinical staff  Tobacco  Allergies  Meds  Med Hx  Surg Hx  Fam Hx  Soc Hx      Reviewed and updated as needed this visit by Provider         ROS:   ROS: 12 point ROS neg other than the symptoms noted above.     OBJECTIVE:                                                    /64 (BP Location: Right arm, Patient Position: Chair, Cuff Size: Adult Large)  Pulse 72  Temp 98.2  F (36.8  C) (Oral)  Ht 5' 8\" (1.727 m)  Wt 235 lb (106.6 kg)  SpO2 95%  BMI 35.73 kg/m2  Body mass index is 35.73 kg/(m^2).   GENERAL: healthy, alert, well nourished, well hydrated, no distress  HENT: ear canals- normal; TMs- normal; Nose- normal; Mouth- no ulcers, no lesions  NECK: no tenderness, no adenopathy, no asymmetry, no masses, no stiffness; thyroid- normal to palpation  RESP: lungs clear to auscultation - no rales, no rhonchi, no wheezes  CV: regular rates and rhythm, normal S1 S2, no S3 or S4 and no murmur, no click or rub -  ABDOMEN: soft, no tenderness, no  hepatosplenomegaly, no masses, normal bowel sounds  MS: extremities- no gross deformities noted, no edema.   Left deltoid tendon tenderness. Shoulder exam - both sides normal; full range of motion, no pain on motion, no tenderness or deformity noted. No scapular spine or clavicular tenderness or crepitus. No impingement or apprehension is noted. Muscle strength is 5/5 at the deltoid, biceps, supraspinatus and subscapularis, internal and external shoulder rotators,  strength and finger spread bilaterally.   Left hip area - some mild pain with abduction, otherwise pretty normal exam.     Diagnostic test results:  See [facility] orders.      ASSESSMENT/PLAN:                                                        ICD-10-CM    1. Type 2 " diabetes mellitus with hyperglycemia, without long-term current use of insulin (H) E11.65 DIABETES EDUCATOR REFERRAL   2. Tendinitis involving left hip abductors M76.892 ORTHO  REFERRAL     [facility]     naproxen (NAPROSYN) 500 MG tablet     DISCONTINUED: naproxen (NAPROSYN) 500 MG tablet   3. Deltoid tendonitis of left shoulder M75.82 naproxen (NAPROSYN) 500 MG tablet     DISCONTINUED: naproxen (NAPROSYN) 500 MG tablet   4. Type 2 diabetes mellitus with diabetic neuropathy, without long-term current use of insulin (H) E11.40 DIABETES EDUCATOR REFERRAL   5. Acute pain of left knee M25.562 ORTHO  REFERRAL     [facility] REFERR[facility]     naproxen (NAPROSYN) 500 MG tablet     DISCONTINUED: naproxen (NAPROSYN) 500 MG tablet     Please, call or return to clinic or go to the ER immediately if signs or symptoms worsen or fail to improve as anticipated.   See Patient Instructions.        Neelam Dooley MD    [facility]

## 2018-08-06 ENCOUNTER — TELEPHONE (OUTPATIENT)
Dept: FAMILY MEDICINE | Facility: CLINIC | Age: 68
End: 2018-08-06

## 2018-08-06 NOTE — TELEPHONE ENCOUNTER
Wife, Francisca called requesting a diabetic ed appt sooner. Next opening is Sept 13th, that is too far out. Please call her to schedule.  Francisca Cell# [phone]  Ok to leave detailed message: yes  Thank you  An le

## 2018-08-06 NOTE — TELEPHONE ENCOUNTER
There are no sooner appointments at .  Advised spouse she can check other locations.  She will call the  Diabetes Care line and see if this will work for them.    Sydnee Shannon

## 2018-08-10 DIAGNOSIS — E11.9 TYPE 2 DIABETES MELLITUS WITHOUT COMPLICATION, WITHOUT LONG-TERM CURRENT USE OF INSULIN (H): ICD-10-CM

## 2018-08-10 DIAGNOSIS — E78.5 HYPERLIPIDEMIA LDL GOAL <70: ICD-10-CM

## 2018-08-10 NOTE — TELEPHONE ENCOUNTER
"Requested Prescriptions   Pending Prescriptions Disp Refills     glimepiride (AMARYL) 4 MG tablet [Pharmacy Med Name: GLIMEPIRIDE 4 MG TABLET] 90 tablet 1        Last Written Prescription Date:  2.12.18  Last Fill Quantity: 90,  # refills: 1   Last Office Visit: No previous visit found   Future Office Visit:      Sig: TAKE 1 TABLET (4 MG) BY MOUTH EVERY MORNING (BEFORE BREAKFAST)    Sulfonylurea Agents Failed    8/10/2018  1:42 AM       Failed - Patient has documented LDL within the past 12 mos.    Recent Labs   Lab Test  07/19/17   0748   LDL  90            Failed - Patient has a recent creatinine (normal) within the past 12 mos.    Recent Labs   Lab Test  07/19/17   0748   CR  0.95            Passed - Blood pressure less than 140/90 in past 6 months    BP Readings from Last 3 Encounters:   08/03/18 124/64   02/12/18 138/80   07/25/17 120/78                Passed - Patient has had a Microalbumin in the past 12 mos.    Recent Labs   Lab Test  07/19/17   0749   MICROL  6   UMALCR  10.05            Passed - Patient has documented A1c within the specified period of time.    If HgbA1C is 8 or greater, it needs to be on file within the past 3 months.  If less than 8, must be on file within the past 6 months.     Recent Labs   Lab Test  02/12/18   1643   A1C  7.5*            Passed - Patient is age 18 or older       Passed - Recent (6 mo) or future (30 days) visit within the authorizing provider's specialty    Patient had office visit in the last 6 months or has a visit in the next 30 days with authorizing provider or within the authorizing provider's specialty.  See \"Patient Info\" tab in inbasket, or \"Choose Columns\" in Meds & Orders section of the refill encounter.            metFORMIN (GLUCOPHAGE) 1000 MG tablet [Pharmacy Med Name: METFORMIN HCL 1,000 MG TABLET] 180 tablet 1    Last Written Prescription Date:  2.12.18  Last Fill Quantity: 180,  # refills: 1   Last Office Visit: No previous visit found   Future Office " "Visit:      Sig: TAKE 1 TABLET (1,000 MG) BY MOUTH 2 TIMES DAILY (WITH MEALS)    Biguanide Agents Failed    8/10/2018  1:42 AM       Failed - Patient has documented LDL within the past 12 mos.    Recent Labs   Lab Test  07/19/17   0748   LDL  90            Passed - Blood pressure less than 140/90 in past 6 months    BP Readings from Last 3 Encounters:   08/03/18 124/64   02/12/18 138/80   07/25/17 120/78                Passed - Patient has had a Microalbumin in the past 12 mos.    Recent Labs   Lab Test  07/19/17   0749   MICROL  6   UMALCR  10.05            Passed - Patient is age 10 or older       Passed - Patient has documented A1c within the specified period of time.    If HgbA1C is 8 or greater, it needs to be on file within the past 3 months.  If less than 8, must be on file within the past 6 months.     Recent Labs   Lab Test  02/12/18   1643   A1C  7.5*            Passed - Patient's CR is NOT>1.4 OR Patient's EGFR is NOT<45 within past 12 mos.    Recent Labs   Lab Test  07/19/17   0748   GFRESTIMATED  79   GFRESTBLACK  >90   GFR Calc         Recent Labs   Lab Test  07/19/17   0748   CR  0.95            Passed - Patient does NOT have a diagnosis of CHF.       Passed - Recent (6 mo) or future (30 days) visit within the authorizing provider's specialty    Patient had office visit in the last 6 months or has a visit in the next 30 days with authorizing provider or within the authorizing provider's specialty.  See \"Patient Info\" tab in inbasket, or \"Choose Columns\" in Meds & Orders section of the refill encounter.            simvastatin (ZOCOR) 40 MG tablet [Pharmacy Med Name: SIMVASTATIN 40 MG TABLET] 90 tablet 1        Last Written Prescription Date:  2.12.18  Last Fill Quantity: 90,  # refills: 1   Last Office Visit: No previous visit found   Future Office Visit:      Sig: TAKE 1 TABLET (40 MG) BY MOUTH AT BEDTIME    Statins Protocol Failed    8/10/2018  1:42 AM       Failed - LDL on file in " "past 12 months    Recent Labs   Lab Test  07/19/17   0748   LDL  90            Passed - No abnormal creatine kinase in past 12 months    No lab results found.            Passed - Recent (12 mo) or future (30 days) visit within the authorizing provider's specialty    Patient had office visit in the last 12 months or has a visit in the next 30 days with authorizing provider or within the authorizing provider's specialty.  See \"Patient Info\" tab in inbasket, or \"Choose Columns\" in Meds & Orders section of the refill encounter.           Passed - Patient is age 18 or older          "

## 2018-08-13 RX ORDER — SIMVASTATIN 40 MG
TABLET ORAL
Qty: 90 TABLET | Refills: 0 | Status: SHIPPED | OUTPATIENT
Start: 2018-08-13 | End: 2018-11-09

## 2018-08-13 RX ORDER — GLIMEPIRIDE 4 MG/1
TABLET ORAL
Qty: 90 TABLET | Refills: 0 | Status: SHIPPED | OUTPATIENT
Start: 2018-08-13 | End: 2018-10-04

## 2018-08-13 NOTE — TELEPHONE ENCOUNTER
Medication is being filled for 1 time refill only due to:  Patient needs to be seen because due for labs.  LOV 8/3/18  Arelis Hopkins RN  [facility]

## 2018-08-17 ENCOUNTER — OFFICE VISIT (OUTPATIENT)
Dept: ORTHOPEDICS | Facility: CLINIC | Age: 68
End: 2018-08-17
Payer: COMMERCIAL

## 2018-08-17 ENCOUNTER — RADIANT APPOINTMENT (OUTPATIENT)
Dept: GENERAL RADIOLOGY | Facility: CLINIC | Age: 68
End: 2018-08-17
Attending: FAMILY MEDICINE
Payer: COMMERCIAL

## 2018-08-17 VITALS
DIASTOLIC BLOOD PRESSURE: 72 MMHG | WEIGHT: 235 LBS | HEIGHT: 68 IN | BODY MASS INDEX: 35.61 KG/M2 | SYSTOLIC BLOOD PRESSURE: 128 MMHG

## 2018-08-17 DIAGNOSIS — M16.12 PRIMARY OSTEOARTHRITIS OF LEFT HIP: Primary | ICD-10-CM

## 2018-08-17 DIAGNOSIS — M25.552 LEFT HIP PAIN: ICD-10-CM

## 2018-08-17 PROCEDURE — 99204 OFFICE O/P NEW MOD 45 MIN: CPT | Performed by: FAMILY MEDICINE

## 2018-08-17 PROCEDURE — 73502 X-RAY EXAM HIP UNI 2-3 VIEWS: CPT

## 2018-08-17 NOTE — LETTER
8/17/2018         RE: Francisco Thornton  [address] [address] [id number]        Dear Colleague,    Thank you for referring your patient, Francisco Thornton, to the [facility]. Please see a copy of my visit note below.    ASSESSMENT & PLAN  Patient Instructions     1. Primary osteoarthritis of left hip    2. Left hip pain      -Patient has left hip pain due to osteoarthritis.  -Patient will start formal physical therapy and home exercise program as tolerated.  -Patient may continue taking his naproxen as needed.  -Patient will follow up in 4 weeks if still symptomatic.  We will consider a intra-articular cortisone injection at that time if necessary.  -Call direct clinic number [[phone]] at any time with questions or concerns.    Albert Yeo MD [facility]  [facility]          -----    SUBJECTIVE  Francisco Thornton is a/an 68 year old male who is seen in consultation at the request of  Neelam Dooley M.D. for evaluation of left hip pain. The patient is seen by themselves.    Onset: 1.5 month(s) ago. Reports insidious onset without acute precipitating event.  Location of Pain: left groin pain with pain radiating down the leg  Rating of Pain at worst: 8/10  Rating of Pain Currently: 4/10  Worsened by: lateral movement, getting in and out of a car  Better with: nothing  Treatments tried: rest/activity avoidance and Aleve  Quality: sharp in left groin, burning in left knee  Associated symptoms: no distal numbness or tingling; denies swelling or warmth  Orthopedic history: NO  Relevant surgical history: NO  Social History: patient works in a manual labor job    Past Medical History:   Diagnosis Date     Controlled type 2 diabetes mellitus without complication, without long-term current use of insulin (H) 6/20/2013     Diabetes (H)      Hyperlipidemia LDL goal <70      Hypertension goal BP (blood pressure) < 140/90       "Obesity, unspecified      Prostatitis, unspecified      Unspecified sinusitis (chronic)      Social History     Social History     Marital status:      Spouse name: N/A     Number of children: 1     Years of education: N/A     Social History Main Topics     Smoking status: Former Smoker     Packs/day: 1.00     Years: 25.00     Types: Cigarettes     Quit date: 6/1/2012     Smokeless tobacco: Never Used      Comment: 1 1/2 PPD     Alcohol use No     Drug use: No     Sexual activity: Yes     Partners: Female     Other Topics Concern     Parent/Sibling W/ Cabg, Mi Or Angioplasty Before 65f 55m? No     Social History Narrative         Patient's past medical, surgical, social, and family histories were reviewed today and no changes are noted.    REVIEW OF SYSTEMS:  10 point ROS is negative other than symptoms noted above in HPI, Past Medical History or as stated below  Constitutional: NEGATIVE for fever, chills, change in weight  Skin: NEGATIVE for worrisome rashes, moles or lesions  GI/: NEGATIVE for bowel or bladder changes  Neuro: NEGATIVE for weakness, dizziness or paresthesias    OBJECTIVE:  /72 (BP Location: Right arm, Patient Position: Sitting, Cuff Size: Adult Large)  Ht 5' 8\" (1.727 m)  Wt 235 lb (106.6 kg)  BMI 35.73 kg/m2   General: healthy, alert and in no distress  HEENT: no scleral icterus or conjunctival erythema  Skin: no suspicious lesions or rash. No jaundice.  CV: no pedal edema  Resp: normal respiratory effort without conversational dyspnea   Psych: normal mood and affect  Gait: normal steady gait with appropriate coordination and balance  Neuro: Normal light sensory exam of lower extremity  MSK:  LEFT HIP  Inspection:    No obvious deformity or asymmetry, level pelvis  Palpation:    Tender about the anterior groin/joint line. Otherwise all other landmarks are nontender.  Active Range of Motion:     Flexion limited by tightness, IR within normal limits, ER  within normal " limits  Strength:    Flexion 5-/5, adduction 5/5, abduction 5/5  Special Tests:    Positive: None    Negative: Logroll, resisted gluteus medius provocation, CECILE, anterior impingement (FADIR), Julian's, SI provocative testing    Independent visualization of the below image:  No results found for this or any previous visit (from the past 24 hour(s)).      Albert Yeo MD [facility]      Again, thank you for allowing me to participate in the care of your patient.        Sincerely,        Albert Yeo, MD

## 2018-08-17 NOTE — PATIENT INSTRUCTIONS
1. Primary osteoarthritis of left hip    2. Left hip pain      -Patient has left hip pain due to osteoarthritis.  -Patient will start formal physical therapy and home exercise program as tolerated.  -Patient may continue taking his naproxen as needed.  -Patient will follow up in 4 weeks if still symptomatic.  We will consider a intra-articular cortisone injection at that time if necessary.  -Call direct clinic number [[phone]] at any time with questions or concerns.    Albert Yeo MD [facility]  [facility] [place]

## 2018-08-17 NOTE — PROGRESS NOTES
ASSESSMENT & PLAN  Patient Instructions     1. Primary osteoarthritis of left hip    2. Left hip pain      -Patient has left hip pain due to osteoarthritis.  -Patient will start formal physical therapy and home exercise program as tolerated.  -Patient may continue taking his naproxen as needed.  -Patient will follow up in 4 weeks if still symptomatic.  We will consider a intra-articular cortisone injection at that time if necessary.  -Call direct clinic number [[phone]] at any time with questions or concerns.    Albert Yeo MD [facility]  [facility]          -----    SUBJECTIVE  Francisco Thornton is a/an 68 year old male who is seen in consultation at the request of  Neelam Dooley M.D. for evaluation of left hip pain. The patient is seen by themselves.    Onset: 1.5 month(s) ago. Reports insidious onset without acute precipitating event.  Location of Pain: left groin pain with pain radiating down the leg  Rating of Pain at worst: 8/10  Rating of Pain Currently: 4/10  Worsened by: lateral movement, getting in and out of a car  Better with: nothing  Treatments tried: rest/activity avoidance and Aleve  Quality: sharp in left groin, burning in left knee  Associated symptoms: no distal numbness or tingling; denies swelling or warmth  Orthopedic history: NO  Relevant surgical history: NO  Social History: patient works in a manual labor job    Past Medical History:   Diagnosis Date     Controlled type 2 diabetes mellitus without complication, without long-term current use of insulin (H) 6/20/2013     Diabetes (H)      Hyperlipidemia LDL goal <70      Hypertension goal BP (blood pressure) < 140/90      Obesity, unspecified      Prostatitis, unspecified      Unspecified sinusitis (chronic)      Social History     Social History     Marital status:      Spouse name: N/A     Number of children: 1     Years of education: N/A     Social History Main  "Topics     Smoking status: Former Smoker     Packs/day: 1.00     Years: 25.00     Types: Cigarettes     Quit date: 6/1/2012     Smokeless tobacco: Never Used      Comment: 1 1/2 PPD     Alcohol use No     Drug use: No     Sexual activity: Yes     Partners: Female     Other Topics Concern     Parent/Sibling W/ Cabg, Mi Or Angioplasty Before 65f 55m? No     Social History Narrative         Patient's past medical, surgical, social, and family histories were reviewed today and no changes are noted.    REVIEW OF SYSTEMS:  10 point ROS is negative other than symptoms noted above in HPI, Past Medical History or as stated below  Constitutional: NEGATIVE for fever, chills, change in weight  Skin: NEGATIVE for worrisome rashes, moles or lesions  GI/: NEGATIVE for bowel or bladder changes  Neuro: NEGATIVE for weakness, dizziness or paresthesias    OBJECTIVE:  /72 (BP Location: Right arm, Patient Position: Sitting, Cuff Size: Adult Large)  Ht 5' 8\" (1.727 m)  Wt 235 lb (106.6 kg)  BMI 35.73 kg/m2   General: healthy, alert and in no distress  HEENT: no scleral icterus or conjunctival erythema  Skin: no suspicious lesions or rash. No jaundice.  CV: no pedal edema  Resp: normal respiratory effort without conversational dyspnea   Psych: normal mood and affect  Gait: normal steady gait with appropriate coordination and balance  Neuro: Normal light sensory exam of lower extremity  MSK:  LEFT HIP  Inspection:    No obvious deformity or asymmetry, level pelvis  Palpation:    Tender about the anterior groin/joint line. Otherwise all other landmarks are nontender.  Active Range of Motion:     Flexion limited by tightness, IR within normal limits, ER  within normal limits  Strength:    Flexion 5-/5, adduction 5/5, abduction 5/5  Special Tests:    Positive: None    Negative: Logroll, resisted gluteus medius provocation, CECILE, anterior impingement (FADIR), Julian's, SI provocative testing    Independent visualization of the below " image:  No results found for this or any previous visit (from the past 24 hour(s)).      Albert Yeo MD [facility]

## 2018-08-17 NOTE — MR AVS SNAPSHOT
After Visit Summary   8/17/2018    Francisco Thornton    MRN: [id number]           Patient Information     Date Of Birth          1950        Visit Information        Provider Department      8/17/2018 4:00 PM Yeo, Albert, MD [facility] SP[facility]TS MEDICINE        Today's Diagnoses     Primary osteoarthritis of left hip    -  1    Left hip pain          Care Instructions    1. Primary osteoarthritis of left hip    2. Left hip pain      -Patient has left hip pain due to osteoarthritis.  -Patient will start formal physical therapy and home exercise program as tolerated.  -Patient may continue taking his naproxen as needed.  -Patient will follow up in 4 weeks if still symptomatic.  We will consider a intra-articular cortisone injection at that time if necessary.  -Call direct clinic number [[phone]] at any time with questions or concerns.    Albert Yeo MD [facility] Sports Medicine  [facility]                Follow-ups after your visit        Your next 10 appointments already scheduled     Sep 13, 2018  2:30 PM CDT   Diabetic Education with  DIABETIC ED RESOURCE   [place] [facility] [place] ([facility])    [address] [place] [address]   [phone]              Who to contact     If you have questions or need follow up information about today's clinic visit or your schedule please contact [facility] directly at [phone].  Normal or non-critical lab and imaging results will be communicated to you by MyChart, letter or phone within 4 business days after the clinic has received the results. If you do not hear from us within 7 days, please contact the clinic through MyChart or phone. If you have a critical or abnormal lab result, we will notify you by phone as soon as possible.  Submit refill requests through [employer] or call your pharmacy and they will forward the refill request to us.  "Please allow 3 business days for your refill to be completed.          Additional Information About Your Visit        MyChart Information     VoÃ¶lkshart gives you secure access to your electronic health record. If you see a primary care provider, you can also send messages to your care team and make appointments. If you have questions, please call your primary care clinic.  If you do not have a primary care provider, please call [phone] and they will assist you.        Care EveryWhere ID     This is your Care EveryWhere ID. This could be used by other organizations to access your [place] medical records  [id number]        Your Vitals Were     Height BMI (Body Mass Index)                5' 8\" (1.727 m) 35.73 kg/m2           Blood Pressure from Last 3 Encounters:   08/17/18 128/72   08/03/18 124/64   02/12/18 138/80    Weight from Last 3 Encounters:   08/17/18 235 lb (106.6 kg)   08/03/18 235 lb (106.6 kg)   02/12/18 238 lb (108 kg)              Today, you had the following     No orders found for display       Primary Care Provider Office Phone # Fax #    Geovany Perkins [phone]       [address] [facility] [address]        Equal Access to Services     SONAL TOM : Hadii ravi ku hadasho Soomaali, waaxda luqadaha, qaybta kaalmada adeegyada, waxay stacey haymirandan tere acevedo. So [facility] [phone].    ATENCIÓN: Si habla español, tiene a smith disposición servicios gratuitos de asistencia lingüística. Llame al [phone].    We comply with applicable federal civil rights laws and [place] laws. We do not discriminate on the basis of race, color, national origin, age, disability, sex, sexual orientation, or gender identity.            Thank you!     Thank you for choosing [facility] SP[facility]TS MEDICINE  for your care. Our goal is always to provide you with excellent care. Hearing back from our patients is one way we can continue to improve our services. Please take a few " minutes to complete the written survey that you may receive in the mail after your visit with us. Thank you!             Your Updated Medication List - Protect others around you: Learn how to safely use, store and throw away your medicines at www.disposemymeds.org.          This list is accurate as of 8/17/18  4:43 PM.  Always use your most recent med list.                   Brand Name Dispense Instructions for use Diagnosis    aspirin 325 MG tablet      take 325 mg by mouth as needed.        blood glucose lancets standard    no brand specified    100 each    Use to test blood sugar 1-2 times daily or as directed.    Controlled type 2 diabetes mellitus without complication, without long-term current use of insulin (H)       blood glucose monitoring lancets           * blood glucose monitoring meter device kit     1 kit    Use to test blood sugars 2 times daily or as directed.    Type 2 diabetes mellitus without complication (H)       * blood glucose monitoring meter device kit    no brand specified    1 kit    Use to test blood sugar 1-2 times daily or as directed.  With test strips and all needed suplies    Controlled type 2 diabetes mellitus without complication, without long-term current use of insulin (H)       glimepiride 4 MG tablet    AMARYL    90 tablet    TAKE 1 TABLET (4 MG) BY MOUTH EVERY MORNING (BEFORE BREAKFAST)    Type 2 diabetes mellitus without complication, without long-term current use of insulin (H)       HYDROcodone-acetaminophen 5-325 MG per tablet    NORCO    30 tablet    Take 1 tablet by mouth every 6 hours as needed for moderate to severe pain    Spondylolisthesis of lumbar region       lisinopril 10 MG tablet    PRINIVIL/ZESTRIL    90 tablet    TAKE 1 TABLET (10 MG) BY MOUTH DAILY    Essential hypertension with goal blood pressure less than 140/90       metFORMIN 1000 MG tablet    GLUCOPHAGE    180 tablet    TAKE 1 TABLET (1,000 MG) BY MOUTH 2 TIMES DAILY (WITH MEALS)    Type 2 diabetes  mellitus without complication, without long-term current use of insulin (H)       naproxen 500 MG tablet    NAPROSYN    60 tablet    Take 1 tablet (500 mg) by mouth 2 times daily as needed for moderate pain - take with food    Tendinitis involving left hip abductors, Deltoid tendonitis of left shoulder, Acute pain of left knee       ONETOUCH ULTRA test strip   Generic drug:  blood glucose monitoring     50 strip    TEST 1-2 TIMES DAILY AS DIRECTED    Type 2 diabetes mellitus without complication (H)       simvastatin 40 MG tablet    ZOCOR    90 tablet    TAKE 1 TABLET (40 MG) BY MOUTH AT BEDTIME    Hyperlipidemia LDL goal <70       tadalafil 20 MG tablet    CIALIS    3 tablet    Take 0.5-1 tablets (10-20 mg) by mouth daily as needed for erectile dysfunction Never use with nitroglycerin, terazosin or doxazosin.    Erectile dysfunction       * Notice:  This list has 2 medication(s) that are the same as other medications prescribed for you. Read the directions carefully, and ask your doctor or other care provider to review them with you.

## 2018-08-24 DIAGNOSIS — E11.9 TYPE 2 DIABETES MELLITUS WITHOUT COMPLICATION (H): ICD-10-CM

## 2018-08-24 NOTE — TELEPHONE ENCOUNTER
"Requested Prescriptions   Pending Prescriptions Disp Refills     ONETOUCH ULTRA test strip [Pharmacy Med Name: ONE TOUCH ULTRA BLUE TEST STRP] 50 strip 0     Sig: TEST 1-2 TIMES DAILY AS DIRECTED    Diabetic Supplies Protocol Passed    8/24/2018  1:29 AM       Passed - Patient is 18 years of age or older       Passed - Recent (6 mo) or future (30 days) visit within the authorizing provider's specialty    Patient had office visit in the last 6 months or has a visit in the next 30 days with authorizing provider.  See \"Patient Info\" tab in inbasket, or \"Choose Columns\" in Meds & Orders section of the refill encounter.            LOV 8/3/2018    Prescription approved per [facility] Refill Protocol.  Arelis Hopkins RN  [facility]    "

## 2018-08-29 DIAGNOSIS — M25.562 ACUTE PAIN OF LEFT KNEE: ICD-10-CM

## 2018-08-29 DIAGNOSIS — M77.8 DELTOID TENDONITIS OF LEFT SHOULDER: ICD-10-CM

## 2018-08-29 DIAGNOSIS — M76.892 TENDINITIS INVOLVING LEFT HIP ABDUCTORS: ICD-10-CM

## 2018-08-29 NOTE — TELEPHONE ENCOUNTER
naproxen (NAPROSYN) 500 MG tablet        Last Written Prescription Date:  8.3.18  Last Fill Quantity: 60,  # refills: 0   Last Office Visit: 8/3/2018   Future Office Visit:            Routing refill request to provider for review/approval because:  Drug not on the [facility], [facility] or [facility] refill protocol or controlled substance

## 2018-08-30 RX ORDER — NAPROXEN 500 MG/1
TABLET ORAL
Qty: 60 TABLET | Refills: 0 | Status: SHIPPED | OUTPATIENT
Start: 2018-08-30 | End: 2019-09-24

## 2018-08-30 NOTE — TELEPHONE ENCOUNTER
Prescription approved per [facility] Refill Protocol.  Arelis Hopkins RN  [facility]

## 2018-09-13 ENCOUNTER — ALLIED HEALTH/NURSE VISIT (OUTPATIENT)
Dept: EDUCATION SERVICES | Facility: CLINIC | Age: 68
End: 2018-09-13
Payer: COMMERCIAL

## 2018-09-13 DIAGNOSIS — E11.9 DIABETES MELLITUS WITHOUT COMPLICATION (H): Primary | ICD-10-CM

## 2018-09-13 PROCEDURE — 95250 CONT GLUC MNTR PHYS/QHP EQP: CPT

## 2018-09-13 NOTE — MR AVS SNAPSHOT
After Visit Summary   9/13/2018    Francisco Thornton    MRN: [id number]           Patient Information     Date Of Birth          1950        Visit Information        Provider Department      9/13/2018 2:30 PM  DIABETIC ED RESOURCE [facility] Instructions    1. Plan to wear the LibrePro sensor for 14 days. It is okay to shower, bathe, and swim (up to 3 feet deep for 30 minutes)    2. Continue with your usual diabetes care plan - check blood sugars and take medicines, as prescribed.    3. Keep a log of what you eat and drink, when you take your medications and how much you take, and exercise you do while you are wearing the sensor.    3. Do not cover the sensor with extra adhesive (the small hole in the center of the sensor must remain uncovered)    4. Use a little extra care, especially when getting dressed or exercising, to avoid accidentally loosening or removing the sensor.     5. Remove the sensor if you need to have an MRI or CT scan.     Return the sensor to the [facility] on 10/2.    Follow-up appointment: 10/2 @ 1:30    [place] [facility] for the [facility] Area:  For Your Diabetes Education and Nutrition Appointments Call:  [phone]   For Diabetes Education or Nutrition Related Questions:   Phone: [phone]  E-mail: [email or web address]  Fax: [phone]   If you need a medication refill please contact your pharmacy. Please allow 3 business days for your refills to be completed.    Instructions for emailing the Diabetes Educators    If you need to communicate a non-urgent message to a Diabetes Educator via email, please send to [email or web address].    Please follow the following email guidelines:    Subject line: Secure: your clinic name (example: Secure: [facility])  In the email please include: First name, middle initial, last name and date of birth.    We will be in touch with you within one  (1) business day.              Follow-ups after your visit        Your next 10 appointments already scheduled     Oct 04, 2018  1:30 PM CDT   Diabetes Education with [place] DIABETIC ED RESOURCE   [facility] [place] ([facility])    [address] [place] [address]   [phone]              Who to contact     If you have questions or need follow up information about today's clinic visit or your schedule please contact [place] [facility] [place] directly at [phone].  Normal or non-critical lab and imaging results will be communicated to you by Lighting by LEDhart, letter or phone within 4 business days after the clinic has received the results. If you do not hear from us within 7 days, please contact the clinic through StyleChat [employer] or phone. If you have a critical or abnormal lab result, we will notify you by phone as soon as possible.  Submit refill requests through [employer] or call your pharmacy and they will forward the refill request to us. Please allow 3 business days for your refill to be completed.          Additional Information About Your Visit        Lighting by LEDhart Information     StyleChat [employer] gives you secure access to your electronic health record. If you see a primary care provider, you can also send messages to your care team and make appointments. If you have questions, please call your primary care clinic.  If you do not have a primary care provider, please call [phone] and they will assist you.        Care EveryWhere ID     This is your Care EveryWhere ID. This could be used by other organizations to access your [facility] medical records  [id number]         Blood Pressure from Last 3 Encounters:   08/17/18 128/72   08/03/18 124/64   02/12/18 138/80    Weight from Last 3 Encounters:   08/17/18 106.6 kg (235 lb)   08/03/18 106.6 kg (235 lb)   02/12/18 108 kg (238 lb)              Today, you had the following     No orders found for display       Primary Care  Provider Office Phone # Fax #    Geovany Perkins [phone]       [address] [facility] [address]        Equal Access to Services     SONAL TOM : Hadstephani ravi morrison fab Sovladimir, waaxda luqadaha, qaybta kaalmada silvanada, nolan carrillo laGemasukumar acevedo. So [facility] [phone].    ATENCIÓN: Si habla español, tiene a smith disposición servicios gratuitos de asistencia lingüística. Llame al [phone].    We comply with applicable federal civil rights laws and [place] laws. We do not discriminate on the basis of race, color, national origin, age, disability, sex, sexual orientation, or gender identity.            Thank you!     Thank you for choosing [place] [facility]  for your care. Our goal is always to provide you with excellent care. Hearing back from our patients is one way we can continue to improve our services. Please take a few minutes to complete the written survey that you may receive in the mail after your visit with us. Thank you!             Your Updated Medication List - Protect others around you: Learn how to safely use, store and throw away your medicines at www.disposemymeds.org.          This list is accurate as of 9/13/18  2:44 PM.  Always use your most recent med list.                   Brand Name Dispense Instructions for use Diagnosis    aspirin 325 MG tablet      take 325 mg by mouth as needed.        blood glucose lancets standard    no brand specified    100 each    Use to test blood sugar 1-2 times daily or as directed.    Controlled type 2 diabetes mellitus without complication, without long-term current use of insulin (H)       blood glucose monitoring lancets           * blood glucose monitoring meter device kit     1 kit    Use to test blood sugars 2 times daily or as directed.    Type 2 diabetes mellitus without complication (H)       * blood glucose monitoring meter device kit    no brand specified    1 kit    Use to test  blood sugar 1-2 times daily or as directed.  With test strips and all needed suplies    Controlled type 2 diabetes mellitus without complication, without long-term current use of insulin (H)       glimepiride 4 MG tablet    AMARYL    90 tablet    TAKE 1 TABLET (4 MG) BY MOUTH EVERY MORNING (BEFORE BREAKFAST)    Type 2 diabetes mellitus without complication, without long-term current use of insulin (H)       HYDROcodone-acetaminophen 5-325 MG per tablet    NORCO    30 tablet    Take 1 tablet by mouth every 6 hours as needed for moderate to severe pain    Spondylolisthesis of lumbar region       lisinopril 10 MG tablet    PRINIVIL/ZESTRIL    90 tablet    TAKE 1 TABLET (10 MG) BY MOUTH DAILY    Essential hypertension with goal blood pressure less than 140/90       metFORMIN 1000 MG tablet    GLUCOPHAGE    180 tablet    TAKE 1 TABLET (1,000 MG) BY MOUTH 2 TIMES DAILY (WITH MEALS)    Type 2 diabetes mellitus without complication, without long-term current use of insulin (H)       naproxen 500 MG tablet    NAPROSYN    60 tablet    TAKE 1 TABLET (500 MG) BY MOUTH 2 TIMES DAILY AS NEEDED FOR MODERATE PAIN - TAKE WITH FOOD    Tendinitis involving left hip abductors, Deltoid tendonitis of left shoulder, Acute pain of left knee       ONETOUCH ULTRA test strip   Generic drug:  blood glucose monitoring     50 strip    TEST 1-2 TIMES DAILY AS DIRECTED    Type 2 diabetes mellitus without complication (H)       simvastatin 40 MG tablet    ZOCOR    90 tablet    TAKE 1 TABLET (40 MG) BY MOUTH AT BEDTIME    Hyperlipidemia LDL goal <70       tadalafil 20 MG tablet    CIALIS    3 tablet    Take 0.5-1 tablets (10-20 mg) by mouth daily as needed for erectile dysfunction Never use with nitroglycerin, terazosin or doxazosin.    Erectile dysfunction       * Notice:  This list has 2 medication(s) that are the same as other medications prescribed for you. Read the directions carefully, and ask your doctor or other care provider to review them  with you.

## 2018-09-13 NOTE — PROGRESS NOTES
Diabetes Self-Management Education & Support      Diabetes Self-Management Education & Support - Professional CGM Insertion    SUBJECTIVE/OBJECTIVE  Presents for: Individual review  Accompanied by: Self  Diabetes education in the past 24mo: No  Focus of Visit: Assistance w/ making life changes  Diabetes type: Type 2  Cultural Influences/Ethnic Background:        Patient seen today for Professional CGM Insertion:    Professional CGM Insertion  Sensor Type: LibrePro  Lot #: [id number]  Serial #: [id number]  Expiration Date: 10/31/18  Indication(s) for CGM Study: Difficult to manage hypoglycemia and/or hyperglycemia       Healthy Eating  Healthy Eating Assessed Today: No    Being Active  Being Active Assessed Today: Yes  Exercise:: Currently not exercising    Monitoring  Monitoring Assessed Today: Yes  Did patient bring glucose meter to appointment? : No  Home Glucose (Sugar) Monitoring: Other (rarely)  Overall Range (mg/dL): 110-130      Taking Medications  Diabetes Medication(s)     Biguanides Sig    metFORMIN (GLUCOPHAGE) 1000 MG tablet TAKE 1 TABLET (1,000 MG) BY MOUTH 2 TIMES DAILY (WITH MEALS)    Sulfonylureas Sig    glimepiride (AMARYL) 4 MG tablet TAKE 1 TABLET (4 MG) BY MOUTH EVERY MORNING (BEFORE BREAKFAST)          Taking Medication Assessed Today: Yes  Current Treatments: Oral Agent (dual therapy)  Problems taking diabetes medications regularly?: No  Diabetes medication side effects?: No  Treatment Compliance: Most of the time    Problem Solving  Problem Solving Assessed Today: No              Reducing Risks  Reducing Risks Assessed Today: No    Healthy Coping  Emotional response to diabetes: Ready to learn, Helplessness  Stage of change: CONTEMPLATION (Considering change and yet undecided)  Patient Activation Measure Survey Score:  HANS Score (Last Two) 12/31/2012   HANS Raw Score 37   Activation Score 49.9   HANS Level 2         ASSESSMENT  Demond feels that his blood sugars are always too high, and he  worries that he can't do much about it. Does admit that his eating habits are likely not that great, and he does not exercise.       INTERVENTION:   Diabetes knowledge and skills assessment:     Patient is knowledgeable in diabetes management concepts related to: Taking Medication    Patient needs further education on the following diabetes management concepts: Healthy Eating and Being Active    Based on learning assessment above, most appropriate setting for further diabetes education would be: Group class or Individual setting.    Education provided today on:    WRITTEN AND VERBAL INFORMATION GIVEN TO SUPPORT UNDERSTANDING OF:   LibrePro CGM: Sensor insertion, intention of monitoring for 14 days. Keep records of BG, food intake, exercise, and medication dosing during wear.     Opportunities for ongoing education and support in diabetes-self management were discussed.    Pt verbalized understanding of concepts discussed and recommendations provided today.       Education Materials Provided:  No new materials provided today      PLAN  See Patient Instructions for co-developed, patient-stated behavior change goals.  AVS printed and provided to patient today. See Follow-Up section for recommended follow-up.    LEONIDES Dudley CDE  Time Spent: 30 minutes  Encounter Type: Individual    Any diabetes medication dose changes were made via the CDE Protocol and Collaborative Practice Agreement with the patient's referring provider. A copy of this encounter was shared with the provider.

## 2018-09-13 NOTE — PATIENT INSTRUCTIONS
1. Plan to wear the LibrePro sensor for 14 days. It is okay to shower, bathe, and swim (up to 3 feet deep for 30 minutes)    2. Continue with your usual diabetes care plan - check blood sugars and take medicines, as prescribed.    3. Keep a log of what you eat and drink, when you take your medications and how much you take, and exercise you do while you are wearing the sensor.    3. Do not cover the sensor with extra adhesive (the small hole in the center of the sensor must remain uncovered)    4. Use a little extra care, especially when getting dressed or exercising, to avoid accidentally loosening or removing the sensor.     5. Remove the sensor if you need to have an MRI or CT scan.     Return the sensor to the [facility] on 10/2.    Follow-up appointment: 10/2 @ 1:30    [place] [facility] Services for the [facility] Area:  For Your Diabetes Education and Nutrition Appointments Call:  [phone]   For Diabetes Education or Nutrition Related Questions:   Phone: [phone]  E-mail: [email or web address]  Fax: [phone]   If you need a medication refill please contact your pharmacy. Please allow 3 business days for your refills to be completed.    Instructions for emailing the Diabetes Educators    If you need to communicate a non-urgent message to a Diabetes Educator via email, please send to [email or web address].    Please follow the following email guidelines:    Subject line: Secure: your clinic name (example: Secure: [facility])  In the email please include: First name, middle initial, last name and date of birth.    We will be in touch with you within one (1) business day.

## 2018-10-03 ENCOUNTER — MEDICAL CORRESPONDENCE (OUTPATIENT)
Dept: HEALTH INFORMATION MANAGEMENT | Facility: CLINIC | Age: 68
End: 2018-10-03

## 2018-10-04 ENCOUNTER — ALLIED HEALTH/NURSE VISIT (OUTPATIENT)
Dept: EDUCATION SERVICES | Facility: CLINIC | Age: 68
End: 2018-10-04
Payer: COMMERCIAL

## 2018-10-04 DIAGNOSIS — E11.9 DIABETES MELLITUS WITHOUT COMPLICATION (H): Primary | ICD-10-CM

## 2018-10-04 DIAGNOSIS — E11.9 TYPE 2 DIABETES MELLITUS WITHOUT COMPLICATION, WITHOUT LONG-TERM CURRENT USE OF INSULIN (H): ICD-10-CM

## 2018-10-04 PROCEDURE — G0108 DIAB MANAGE TRN  PER INDIV: HCPCS

## 2018-10-04 RX ORDER — GLIMEPIRIDE 4 MG/1
8 TABLET ORAL
Qty: 90 TABLET | Refills: 0 | Status: SHIPPED | OUTPATIENT
Start: 2018-10-04 | End: 2018-11-13

## 2018-10-04 NOTE — MR AVS SNAPSHOT
After Visit Summary   10/4/2018    Francisco Thornton    MRN: [id number]           Patient Information     Date Of Birth          1950        Visit Information        Provider Department      10/4/2018 1:30 PM  DIABETIC ED RESOURCE [facility]        Today's Diagnoses     Diabetes mellitus without complication (H)    -  1    Type 2 diabetes mellitus without complication, without long-term current use of insulin (H)          Care Instructions    Start taking 2 tablets glimepiride each morning (8mg total)    Ideally, we want you to eat 5 carb choices (75 grams) or less per meal, although you might need to just focus on eating a little less than you are currently, even if right away you aren't hitting the 5 carb/meal goal.           Follow-ups after your visit        Who to contact     If you have questions or need follow up information about today's clinic visit or your schedule please contact [facility] directly at [phone].  Normal or non-critical lab and imaging results will be communicated to you by LightningBuyhart, letter or phone within 4 business days after the clinic has received the results. If you do not hear from us within 7 days, please contact the clinic through Yassets or phone. If you have a critical or abnormal lab result, we will notify you by phone as soon as possible.  Submit refill requests through Yassets or call your pharmacy and they will forward the refill request to us. Please allow 3 business days for your refill to be completed.          Additional Information About Your Visit        LightningBuyhart Information     Yassets gives you secure access to your electronic health record. If you see a primary care provider, you can also send messages to your care team and make appointments. If you have questions, please call your primary care clinic.  If you do not have a primary care provider, please call [phone] and they will  assist you.        Care EveryWhere ID     This is your Care EveryWhere ID. This could be used by other organizations to access your [facility] medical records  [id number]         Blood Pressure from Last 3 Encounters:   08/17/18 128/72   08/03/18 124/64   02/12/18 138/80    Weight from Last 3 Encounters:   08/17/18 106.6 kg (235 lb)   08/03/18 106.6 kg (235 lb)   02/12/18 108 kg (238 lb)              We Performed the Following     DIABETES EDUCATION - Individual  []          Today's Medication Changes          These changes are accurate as of 10/4/18  3:05 PM.  If you have any questions, ask your nurse or doctor.               These medicines have changed or have updated prescriptions.        Dose/Directions    glimepiride 4 MG tablet   Commonly known as:  AMARYL   This may have changed:  See the new instructions.   Used for:  Type 2 diabetes mellitus without complication, without long-term current use of insulin (H)        Dose:  8 mg   Take 2 tablets (8 mg) by mouth every morning (before breakfast)   Quantity:  90 tablet   Refills:  0            Where to get your medicines      These medications were sent to [id number] - [place], [place] - [address]  [address] [address], [place] [address]     Phone:  [phone]     glimepiride 4 MG tablet                Primary Care Provider Office Phone # Fax #    Geovany Perkins [phone]       [address] [place] [address]        Equal Access to Services     [facility] NEGRO : Hadii ravi zhouo Sovladimir, waaxda luqadaha, qaybta kaalmada cyril, waxanne stacey acevedo. So [facility] [phone].    ATENCIÓN: Si habla español, tiene a smith disposición servicios gratuitos de asistencia lingüística. Llame al [phone].    We comply with applicable federal civil rights laws and [place] laws. We do not discriminate on the basis of race, color, national origin, age, disability, sex, sexual orientation, or gender  identity.            Thank you!     Thank you for choosing [facility]  for your care. Our goal is always to provide you with excellent care. Hearing back from our patients is one way we can continue to improve our services. Please take a few minutes to complete the written survey that you may receive in the mail after your visit with us. Thank you!             Your Updated Medication List - Protect others around you: Learn how to safely use, store and throw away your medicines at www.disposemymeds.org.          This list is accurate as of 10/4/18  3:05 PM.  Always use your most recent med list.                   Brand Name Dispense Instructions for use Diagnosis    aspirin 325 MG tablet      take 325 mg by mouth as needed.        blood glucose lancets standard    no brand specified    100 each    Use to test blood sugar 1-2 times daily or as directed.    Controlled type 2 diabetes mellitus without complication, without long-term current use of insulin (H)       blood glucose monitoring lancets           * blood glucose monitoring meter device kit     1 kit    Use to test blood sugars 2 times daily or as directed.    Type 2 diabetes mellitus without complication (H)       * blood glucose monitoring meter device kit    no brand specified    1 kit    Use to test blood sugar 1-2 times daily or as directed.  With test strips and all needed suplies    Controlled type 2 diabetes mellitus without complication, without long-term current use of insulin (H)       glimepiride 4 MG tablet    AMARYL    90 tablet    Take 2 tablets (8 mg) by mouth every morning (before breakfast)    Type 2 diabetes mellitus without complication, without long-term current use of insulin (H)       HYDROcodone-acetaminophen 5-325 MG per tablet    NORCO    30 tablet    Take 1 tablet by mouth every 6 hours as needed for moderate to severe pain    Spondylolisthesis of lumbar region       lisinopril 10 MG tablet     PRINIVIL/ZESTRIL    90 tablet    TAKE 1 TABLET (10 MG) BY MOUTH DAILY    Essential hypertension with goal blood pressure less than 140/90       metFORMIN 1000 MG tablet    GLUCOPHAGE    180 tablet    TAKE 1 TABLET (1,000 MG) BY MOUTH 2 TIMES DAILY (WITH MEALS)    Type 2 diabetes mellitus without complication, without long-term current use of insulin (H)       naproxen 500 MG tablet    NAPROSYN    60 tablet    TAKE 1 TABLET (500 MG) BY MOUTH 2 TIMES DAILY AS NEEDED FOR MODERATE PAIN - TAKE WITH FOOD    Tendinitis involving left hip abductors, Deltoid tendonitis of left shoulder, Acute pain of left knee       ONETOUCH ULTRA test strip   Generic drug:  blood glucose monitoring     50 strip    TEST 1-2 TIMES DAILY AS DIRECTED    Type 2 diabetes mellitus without complication (H)       simvastatin 40 MG tablet    ZOCOR    90 tablet    TAKE 1 TABLET (40 MG) BY MOUTH AT BEDTIME    Hyperlipidemia LDL goal <70       tadalafil 20 MG tablet    CIALIS    3 tablet    Take 0.5-1 tablets (10-20 mg) by mouth daily as needed for erectile dysfunction Never use with nitroglycerin, terazosin or doxazosin.    Erectile dysfunction       * Notice:  This list has 2 medication(s) that are the same as other medications prescribed for you. Read the directions carefully, and ask your doctor or other care provider to review them with you.

## 2018-10-04 NOTE — Clinical Note
Please see scanned continuous glucose monitoring (CGM) reports, interpretation and recommendations. As a provider, you can bill for a non face-to-face interpretation of the sensor report. If you feel it is appropriate, please create a 'Documentation Only' encounter noting the interpretation and your recommended plan and bill for this glucose sensor interpretation using code [id number]. Thanks! LEONIDES Dudley CDE

## 2018-10-04 NOTE — PATIENT INSTRUCTIONS
Start taking 2 tablets glimepiride each morning (8mg total)    Ideally, we want you to eat 5 carb choices (75 grams) or less per meal, although you might need to just focus on eating a little less than you are currently, even if right away you aren't hitting the 5 carb/meal goal.

## 2018-10-04 NOTE — PROGRESS NOTES
Diabetes Self-Management Education & Support      Diabetes Education Self-Management & Training: Follow-up and Continuous Glucose Monitor Download    Francisco Thornton presents today for download and report review of Professional continuous glucose monitor device      Current Diabetes Management per Patient:  Diabetes Medication(s)     Biguanides Sig    metFORMIN (GLUCOPHAGE) 1000 MG tablet TAKE 1 TABLET (1,000 MG) BY MOUTH 2 TIMES DAILY (WITH MEALS)    Sulfonylureas Sig    glimepiride (AMARYL) 4 MG tablet Take 2 tablets (8 mg) by mouth every morning (before breakfast)          Taking Medication Assessed Today: Yes  Current Treatments: Oral Agent (dual therapy)  Problems taking diabetes medications regularly?: No  Diabetes medication side effects?: No  Treatment Compliance: Most of the time    Most Recent A1c Result:    Lab Results   Component Value Date    A1C 7.5 02/12/2018       Continuous Glucose Monitor Interpretation     Reports:        Consistent day-to-day patterns: Pattern of daytime hyperglycemia, Pattern of post meal hyperglycemia    Healthy Eating  Healthy Eating Assessed Today: No  Patient on a regular basis: Eats 3 meals a day  Meal planning: None  Meals include: Breakfast, Lunch, Dinner  Breakfast: raisin bran (2 C with added sugar) and banana OR chicken  Lunch: 2 chicken burritos OR chinese food  Dinner: spaghetti OR chicken, corn, muffin  Snacks: 2 mounds, ice cream  Beverages: Coffee, Water  Has patient met with a dietitian in the past?: No    Being Active  Being Active Assessed Today: Yes  Exercise:: Currently not exercising    Monitoring  Monitoring Assessed Today: Yes  Did patient bring glucose meter to appointment? : No  Home Glucose (Sugar) Monitoring: Other (rarely)  High Glucose Range (mg/dL): >200  Overall Range (mg/dL): 180-200    Taking Medications  Diabetes Medication(s)     Biguanides Sig    metFORMIN (GLUCOPHAGE) 1000 MG tablet TAKE 1 TABLET (1,000 MG) BY MOUTH 2 TIMES DAILY (WITH  "MEALS)    Sulfonylureas Sig    glimepiride (AMARYL) 4 MG tablet Take 2 tablets (8 mg) by mouth every morning (before breakfast)          Taking Medication Assessed Today: Yes  Current Treatments: Oral Agent (dual therapy)  Problems taking diabetes medications regularly?: No  Diabetes medication side effects?: No  Treatment Compliance: Most of the time    Problem Solving  Problem Solving Assessed Today: Yes  Hypoglycemia Frequency: Never              Reducing Risks  Reducing Risks Assessed Today: No    Healthy Coping  Healthy Coping Assessed Today: Yes  Emotional response to diabetes: Ready to learn, Helplessness  Stage of change: CONTEMPLATION (Considering change and yet undecided)  Patient Activation Measure Survey Score:  HANS Score (Last Two) 12/31/2012   HANS Raw Score 37   Activation Score 49.9   HANS Level 2         Assessment:  Medication and/or insulin dosing is: accurate    Demond states that he is aware that his meal portions are too large, but \"I didn't realize it was this bad.\" Does worry that he isn't going to be able to eat anything if he does monitor his carb intake. Wife states that he is focused on cleaning his plate, and will clean hers to so as not to waste food.       INTERVENTION:   Diabetes knowledge and skills assessment:     Patient is knowledgeable in diabetes management concepts related to: Healthy Eating, Monitoring, Taking Medication, Problem Solving and Reducing Risks    Patient needs further education on the following diabetes management concepts: Healthy Eating and Being Active    Based on learning assessment above, most appropriate setting for further diabetes education would be: Group class or Individual setting.    Education provided today on:  AADE Self-Care Behaviors:  Healthy Eating: carbohydrate counting, consistency in amount, composition, and timing of food intake, eating out and portion control  Monitoring: individual blood glucose targets and frequency of monitoring  Taking " Medication: action of prescribed medication  Problem Solving: low blood glucose - causes, signs/symptoms, treatment and prevention and carrying a carbohydrate source at all times  Reducing Risks: A1C - goals, relating to blood glucose levels, how often to check  Healthy Coping: recognize feelings about diagnosis and benefits of making appropriate lifestyle changes    CGM-specific education:   May be interested in a personal CGM in the future, not ready to order today    Pt verbalized understanding of concepts discussed and recommendations provided today.       Education Materials Provided:  Carbohydrate Counting    PLAN:  See Patient Instructions for co-developed, patient-stated behavior change goals.  Increased glimepiride to 8 mg/day  AVS printed and provided to patient today. See Follow-Up section for recommended follow-up.    LEONIEDS Dudley CDE  Time Spent: 60 minutes  Encounter Type: Individual    Any diabetes medication dose changes were made via the CDE Protocol and Collaborative Practice Agreement with the patient's referring provider. A copy of this encounter was shared with the provider.

## 2018-10-05 DIAGNOSIS — E11.9 CONTROLLED TYPE 2 DIABETES MELLITUS WITHOUT COMPLICATION, WITHOUT LONG-TERM CURRENT USE OF INSULIN (H): Primary | ICD-10-CM

## 2018-10-05 DIAGNOSIS — E11.42 DIABETIC POLYNEUROPATHY ASSOCIATED WITH TYPE 2 DIABETES MELLITUS (H): ICD-10-CM

## 2018-10-05 PROCEDURE — 95251 CONT GLUC MNTR ANALYSIS I&R: CPT | Performed by: FAMILY MEDICINE

## 2018-10-05 NOTE — PROGRESS NOTES
I have reviewed in detail pt's 72 hour CGM report and agree with DANA Dudley's, interpretion of CGM report.     billing code: [id number]         Neelam Dooley MD

## 2018-11-01 DIAGNOSIS — E11.9 TYPE 2 DIABETES MELLITUS WITHOUT COMPLICATION, WITHOUT LONG-TERM CURRENT USE OF INSULIN (H): ICD-10-CM

## 2018-11-02 RX ORDER — GLIMEPIRIDE 4 MG/1
TABLET ORAL
Qty: 90 TABLET | Refills: 0 | Status: SHIPPED | OUTPATIENT
Start: 2018-11-02 | End: 2018-11-12

## 2018-11-02 NOTE — TELEPHONE ENCOUNTER
"Requested Prescriptions   Pending Prescriptions Disp Refills     glimepiride (AMARYL) 4 MG tablet [Pharmacy Med Name: GLIMEPIRIDE 4 MG TABLET] 90 tablet 0          Last Written Prescription Date:  10.4.18  Last Fill Quantity: 90,  # refills: 0   Last Office Visit: 8/3/2018   Future Office Visit:      Sig: TAKE 1 TABLET (4 MG) BY MOUTH EVERY MORNING (BEFORE BREAKFAST). PT DUE FOR LABS FOR REFILLS.    Sulfonylurea Agents Failed    11/1/2018  8:32 PM       Failed - Patient has documented LDL within the past 12 mos.    Recent Labs   Lab Test  07/19/17   0748   LDL  90            Failed - Patient has had a Microalbumin in the past 12 mos.    Recent Labs   Lab Test  07/19/17   0749   MICROL  6   UMALCR  10.05            Failed - Patient has documented A1c within the specified period of time.    If HgbA1C is 8 or greater, it needs to be on file within the past 3 months.  If less than 8, must be on file within the past 6 months.     Recent Labs   Lab Test  02/12/18   1643   A1C  7.5*            Failed - Patient has a recent creatinine (normal) within the past 12 mos.    Recent Labs   Lab Test  07/19/17   0748   CR  0.95            Passed - Blood pressure less than 140/90 in past 6 months    BP Readings from Last 3 Encounters:   08/17/18 128/72   08/03/18 124/64   02/12/18 138/80                Passed - Patient is age 18 or older       Passed - Recent (6 mo) or future (30 days) visit within the authorizing provider's specialty    Patient had office visit in the last 6 months or has a visit in the next 30 days with authorizing provider or within the authorizing provider's specialty.  See \"Patient Info\" tab in inbasket, or \"Choose Columns\" in Meds & Orders section of the refill encounter.              "

## 2018-11-02 NOTE — TELEPHONE ENCOUNTER
Prescription approved per [facility] Refill Protocol. Dose recently updated per DM education.   Arelis Hopkins RN  [facility]

## 2018-11-09 DIAGNOSIS — E11.9 TYPE 2 DIABETES MELLITUS WITHOUT COMPLICATION, WITHOUT LONG-TERM CURRENT USE OF INSULIN (H): ICD-10-CM

## 2018-11-09 DIAGNOSIS — E78.5 HYPERLIPIDEMIA LDL GOAL <70: ICD-10-CM

## 2018-11-09 RX ORDER — SIMVASTATIN 40 MG
TABLET ORAL
Qty: 30 TABLET | Refills: 0 | Status: SHIPPED | OUTPATIENT
Start: 2018-11-09 | End: 2018-11-13

## 2018-11-09 NOTE — TELEPHONE ENCOUNTER
Pt did not follow up per last fill. LOV 8/3/18    Medication is being filled for 1 time refill only due to:  Patient needs labs per protocol.   Arelis Hopkins RN  [facility]

## 2018-11-09 NOTE — TELEPHONE ENCOUNTER
"Requested Prescriptions   Pending Prescriptions Disp Refills     simvastatin (ZOCOR) 40 MG tablet [Pharmacy Med Name: SIMVASTATIN 40 MG TABLET] 90 tablet 0        Last Written Prescription Date:  8.13.18  Last Fill Quantity: 90,  # refills: 0   Last Office Visit: 8/3/2018   Future Office Visit:      Sig: TAKE 1 TABLET (40 MG) BY MOUTH AT BEDTIME    Statins Protocol Failed    11/9/2018  1:42 AM       Failed - LDL on file in past 12 months    Recent Labs   Lab Test  07/19/17   0748   LDL  90            Passed - No abnormal creatine kinase in past 12 months    No lab results found.            Passed - Recent (12 mo) or future (30 days) visit within the authorizing provider's specialty    Patient had office visit in the last 12 months or has a visit in the next 30 days with authorizing provider or within the authorizing provider's specialty.  See \"Patient Info\" tab in inbasket, or \"Choose Columns\" in Meds & Orders section of the refill encounter.             Passed - Patient is age 18 or older        metFORMIN (GLUCOPHAGE) 1000 MG tablet [Pharmacy Med Name: METFORMIN HCL 1,000 MG TABLET] 180 tablet 0        Last Written Prescription Date:  8.13.18  Last Fill Quantity: 180,  # refills: 0   Last Office Visit: 8/3/2018   Future Office Visit:      Sig: TAKE 1 TABLET BY MOUTH 2 TIMES DAILY (WITH MEALS). PT DUE FOR LABS FOR FURTHER REFILLS.    Biguanide Agents Failed    11/9/2018  1:42 AM       Failed - Patient has documented LDL within the past 12 mos.    Recent Labs   Lab Test  07/19/17   0748   LDL  90            Failed - Patient has had a Microalbumin in the past 15 mos.    Recent Labs   Lab Test  07/19/17   0749   MICROL  6   UMALCR  10.05            Failed - Patient has documented A1c within the specified period of time.    If HgbA1C is 8 or greater, it needs to be on file within the past 3 months.  If less than 8, must be on file within the past 6 months.     Recent Labs   Lab Test  02/12/18   1643   A1C  7.5*         " "   Passed - Blood pressure less than 140/90 in past 6 months    BP Readings from Last 3 Encounters:   08/17/18 128/72   08/03/18 124/64   02/12/18 138/80                Passed - Patient is age 10 or older       Passed - Patient's CR is NOT>1.4 OR Patient's EGFR is NOT<45 within past 12 mos.    Recent Labs   Lab Test  07/19/17   0748   GFRESTIMATED  79   GFRESTBLACK  >90   GFR Calc         Recent Labs   Lab Test  07/19/17   0748   CR  0.95            Passed - Patient does NOT have a diagnosis of CHF.       Passed - Recent (6 mo) or future (30 days) visit within the authorizing provider's specialty    Patient had office visit in the last 6 months or has a visit in the next 30 days with authorizing provider or within the authorizing provider's specialty.  See \"Patient Info\" tab in inbasket, or \"Choose Columns\" in Meds & Orders section of the refill encounter.              "

## 2018-11-12 ENCOUNTER — TELEPHONE (OUTPATIENT)
Dept: FAMILY MEDICINE | Facility: CLINIC | Age: 68
End: 2018-11-12

## 2018-11-12 NOTE — TELEPHONE ENCOUNTER
Demond returned call:     Dizziness  Onset: 4-5 days    Description:   Do you feel faint:  no -- feels like may loose balance. Is trying not to do any fast movements. If takes time with movement everything is fine.   Does it feel like the surroundings (bed, room) are moving: no   Unsteady/off balance: YES  Have you passed out or fallen: no --only because he is aware of it he says.     Intensity: moderate    Progression of Symptoms:  waxing and waning seems to start in the morning and this determines how things will go.     Accompanying Signs & Symptoms:  Heart palpitations: no   Nausea, vomiting: no   Weakness in arms or legs: no   Fatigue: no   Vision or speech changes: no, is going to have cataract surgery.    Ringing in ears (Tinnitus): no   Hearing Loss: YES- both ears, has been longstanding    History:   Head trauma/concussion hx: no   Previous similar symptoms: no --but had vertigo from dehydration--seen in ED for that, within the last year.   Recent bleeding history: no     Precipitating factors:   Worse with activity or head movement: YES--if takes his time not as bothersome, but if sudden he really feels   Any new medications (BP?): YES- diabetic medication was increased.   Alcohol/drug abuse/withdrawal: no     Alleviating factors:   Does staying in a fixed position give relief:  YES- if steady is better.     Therapies Tried and outcome: no     Is lightheaded and hard to keep balance. He says he is drinking a lot of water because he wants to make sure it is not because of dehydration. I do not see opening today for Dr. Perkins, will route to triage to schedule and review with RL.     Call Demond back at  [phone], ok to leave a detailed message. Okay to text. I advised him we can not text and to be aware of return call. Informed to be careful and move slowly as he has been in mean time to be safe. Meli Peña R.N.--[facility]

## 2018-11-12 NOTE — TELEPHONE ENCOUNTER
Wife calling to report patient has been dizzy for 4-5 days. He is diabetic. Blood sugar yesterday was 97 after eating she thinks. He is on oral medication for his diabetes but she is unsure of the names.  She was unable to answer questions so I advised her to have him call us back so we can triage him.  She agreed and will have him call us back.    JOSE Quinonez, RN, N  [facility]  ) [phone]

## 2018-11-12 NOTE — TELEPHONE ENCOUNTER
How has BS been? Need to verify diabetic meds and dosages. How has BP been? Taking meds as prescribed?    Attempt #1  Called patient # below - Left a non-detailed message to call back and speak with any triage nurse.    Ailyn Lopez RN  [facility]

## 2018-11-12 NOTE — TELEPHONE ENCOUNTER
Called patient @ # below    Patient states his BS have been around 110-150. DENIES: hypo- and hyperglycemic symptoms  Patient states the glimepiride was doubled on 10/04/2018 - is now taking 8MG PO q.morning. Patient still doing metformin (1000MG) 1 tab PO BID.     Patient stated he has also been taking lisinopril as prescribed - has not been monitoring BP    DENIES: CP, SOB, Difficulty Breathing, new onset Numbness/Tingling, HA, Vision/Hearing Changes, N/V    Advised OV within the next 1-2 days - OV scheduled for 11/13/2018  Advised patient that if new or worsening symptoms appear (reviewed new & worsening symptoms) to call the clinic or be seen in the the ER  Patient stated an understanding and agreed with plan.    Ailyn Lopez RN  [facility]

## 2018-11-13 ENCOUNTER — OFFICE VISIT (OUTPATIENT)
Dept: FAMILY MEDICINE | Facility: CLINIC | Age: 68
End: 2018-11-13
Payer: COMMERCIAL

## 2018-11-13 VITALS
WEIGHT: 230 LBS | HEIGHT: 68 IN | HEART RATE: 90 BPM | SYSTOLIC BLOOD PRESSURE: 130 MMHG | TEMPERATURE: 98.5 F | OXYGEN SATURATION: 95 % | BODY MASS INDEX: 34.86 KG/M2 | DIASTOLIC BLOOD PRESSURE: 62 MMHG

## 2018-11-13 DIAGNOSIS — E11.9 CONTROLLED TYPE 2 DIABETES MELLITUS WITHOUT COMPLICATION, WITHOUT LONG-TERM CURRENT USE OF INSULIN (H): Primary | ICD-10-CM

## 2018-11-13 DIAGNOSIS — Z12.11 SCREEN FOR COLON CANCER: ICD-10-CM

## 2018-11-13 DIAGNOSIS — E11.9 TYPE 2 DIABETES MELLITUS WITHOUT COMPLICATION, WITHOUT LONG-TERM CURRENT USE OF INSULIN (H): ICD-10-CM

## 2018-11-13 DIAGNOSIS — E66.01 MORBID OBESITY (H): ICD-10-CM

## 2018-11-13 DIAGNOSIS — I10 HYPERTENSION GOAL BP (BLOOD PRESSURE) < 140/90: ICD-10-CM

## 2018-11-13 DIAGNOSIS — E78.5 HYPERLIPIDEMIA LDL GOAL <70: ICD-10-CM

## 2018-11-13 DIAGNOSIS — H81.10 BENIGN PAROXYSMAL POSITIONAL VERTIGO, UNSPECIFIED LATERALITY: ICD-10-CM

## 2018-11-13 DIAGNOSIS — I10 ESSENTIAL HYPERTENSION WITH GOAL BLOOD PRESSURE LESS THAN 140/90: ICD-10-CM

## 2018-11-13 DIAGNOSIS — Z12.5 SCREENING FOR PROSTATE CANCER: ICD-10-CM

## 2018-11-13 PROCEDURE — 82043 UR ALBUMIN QUANTITATIVE: CPT | Performed by: FAMILY MEDICINE

## 2018-11-13 PROCEDURE — 99214 OFFICE O/P EST MOD 30 MIN: CPT | Performed by: FAMILY MEDICINE

## 2018-11-13 RX ORDER — SIMVASTATIN 40 MG
40 TABLET ORAL AT BEDTIME
Qty: 90 TABLET | Refills: 1 | Status: SHIPPED | OUTPATIENT
Start: 2018-11-13 | End: 2019-06-04

## 2018-11-13 RX ORDER — LISINOPRIL 10 MG/1
10 TABLET ORAL DAILY
Qty: 90 TABLET | Refills: 1 | Status: SHIPPED | OUTPATIENT
Start: 2018-11-13 | End: 2019-11-20

## 2018-11-13 RX ORDER — GLIMEPIRIDE 4 MG/1
8 TABLET ORAL
Qty: 180 TABLET | Refills: 1 | Status: SHIPPED | OUTPATIENT
Start: 2018-11-13 | End: 2019-05-19

## 2018-11-13 NOTE — PROGRESS NOTES
SUBJECTIVE:                                                      Francisco Thornton is a 68 year old male who presents to clinic today for the following health issues:    Triaged 11/12/2018    Dizziness  Onset: 4-5 days, the day after he got his eyes dilated to evaluated for cataract surgery.      Description:   Do you feel faint:  no -- feels like may loose balance. Is trying not to do any fast movements. If takes time with movement everything is fine.   Does it feel like the surroundings (bed, room) are moving: no   Unsteady/off balance: YES  Have you passed out or fallen: no --only because he is aware of it he says.     Intensity: moderate    Progression of Symptoms:  waxing and waning seems to start in the morning and this determines how things will go.     Accompanying Signs & Symptoms:  Heart palpitations: no   Nausea, vomiting: no   Weakness in arms or legs: no   Fatigue: no   Vision or speech changes: no, is going to have cataract surgery.    Ringing in ears (Tinnitus): no   Hearing Loss: YES- both ears, has been longstanding    History:   Head trauma/concussion hx: no   Previous similar symptoms: no --but had vertigo from dehydration--seen in ED for that, within the last year. This is not the same as this event, because he otherwise feels fine.  Recent bleeding history: no     Precipitating factors:   Worse with activity or head movement: YES--if takes his time not as bothersome, but if sudden he really feels it.  [place], [place] the highway, he can get dizzy.   Any new medications (BP?): YES- diabetic medication was increased. No symptoms of hypoglycemia however.  Also, no change in his BP medications.    Alcohol/drug abuse/withdrawal: no     Alleviating factors:   Does staying in a fixed position give relief:  YES- if steady is better.      Therapies Tried and outcome: no      Is lightheaded and hard to keep balance. He says he is drinking a lot of water because he wants to make sure it  "is not because of dehydration.    Problem list and histories reviewed & adjusted, as indicated.  Additional history: as documented    ROS:  Constitutional, HEENT, cardiovascular, pulmonary, GI, , musculoskeletal, neuro, skin, endocrine and psych systems are negative, except as otherwise noted.    OBJECTIVE:                                                      /62  Pulse 90  Temp 98.5  F (36.9  C) (Oral)  Ht 5' 8\" (1.727 m)  Wt 230 lb (104.3 kg)  SpO2 95%  BMI 34.97 kg/m2 Body mass index is 34.97 kg/(m^2).   GENERAL: healthy, alert, well nourished, well hydrated, no distress  HENT: ear canals- normal; TMs- normal; Nose- normal; Mouth- no ulcers, no lesions  NECK: no tenderness, no adenopathy, no asymmetry, no masses, no stiffness; thyroid- normal to palpation  RESP: lungs clear to auscultation - no rales, no rhonchi, no wheezes  CV: Systolic murmur II/VI, regular rates and rhythm, normal S1 S2, no S3 or S4.  ABDOMEN: soft, no tenderness, no  hepatosplenomegaly, no masses, normal bowel sounds  MS: extremities- no gross deformities noted, no edema  SKIN: no suspicious lesions, no rashes  NEURO: CNs II-XII in tact,  no nystagmus, normal head thrust and Frederic Hallpike maneuvers.  Strength and tone- normal, sensory exam- grossly normal, mentation- intact, speech- normal, reflexes- symmetric  PSYCH: Alert and oriented times 3; speech- coherent , normal rate and volume; able to articulate logical thoughts, able to abstract reason, no tangential thoughts, no hallucinations or delusions, affect- normal      ASSESSMENT/PLAN:                                                      Francisco was seen today for dizziness.    Diagnoses and all orders for this visit:    Controlled type 2 diabetes mellitus without complication, without long-term current use of insulin (H)  -     BASIC METABOLIC PANEL  -     HEMOGLOBIN A1C  -     Albumin Random Urine Quantitative with Creat Ratio    Hyperlipidemia LDL goal <70 - controlled - " continue medication.   -     Lipid panel reflex to direct LDL Non-fasting  -     simvastatin (ZOCOR) 40 MG tablet; Take 1 tablet (40 mg) by mouth At Bedtime    Morbid obesity (H) - diet and exercise    Screening for prostate cancer  -     PROSTATE SPEC ANTIGEN SCREEN    Screen for colon cancer  -     GASTROENTEROLOGY ADULT REF PROCEDURE ONLY Chris Vasquez [phone]; [facility]    Benign paroxysmal positional vertigo, unspecified laterality: Most likely in the context of his symptoms and presentation.  Unlikely any central causes of vertigo due to no headache, FND, or nystagmus.          - slow head movement and recheck if needed.    Type 2 diabetes mellitus without complication, without long-term current use of insulin (H) - controlled - continue medication.   -     glimepiride (AMARYL) 4 MG tablet; Take 2 tablets (8 mg) by mouth every morning (before breakfast)  -     metFORMIN (GLUCOPHAGE) 1000 MG tablet; Take 1 tablet (1,000 mg) by mouth 2 times daily (with meals)    Essential hypertension with goal blood pressure less than 140/90 - controlled - continue medication.   -     lisinopril (PRINIVIL/ZESTRIL) 10 MG tablet; Take 1 tablet (10 mg) by mouth daily    Risks, benefits and alternatives of treatments discussed. Plan agreed on.      Followup: Return in about 1 week (around 11/20/2018), or if symptoms worsen or fail to improve and a diabetic med check in 6 months. .    See patient instructions.           Dinh Perkins MD   Pager: [phone]    Figueroa Chaudhary, MS3 Student,  has participated in the care of this patient.     Provider Disclosure:  I agree with above History, Review of Systems, Physical exam and Plan.  I have reviewed the content of the documentation and have edited it as needed. I have personally performed the services documented here and the documentation accurately represents those services and the decisions I have made.      Electronically signed by:          Dinh Perkins M.D.

## 2018-11-13 NOTE — MR AVS SNAPSHOT
After Visit Summary   11/13/2018    Francisco Thornton    MRN: [id number]           Patient Information     Date Of Birth          1950        Visit Information        Provider Department      11/13/2018 1:40 PM Geovany Perkins MD [facility]        Today's Diagnoses     Controlled type 2 diabetes mellitus without complication, without long-term current use of insulin (H)    -  1    Hyperlipidemia LDL goal <70        Morbid obesity (H)        Hypertension goal BP (blood pressure) < 140/90        Screening for prostate cancer        Screen for colon cancer        Benign paroxysmal positional vertigo, unspecified laterality        Type 2 diabetes mellitus without complication, without long-term current use of insulin (H)        Essential hypertension with goal blood pressure less than 140/90           Follow-ups after your visit        Additional Services     GASTROENTEROLOGY ADULT REF PROCEDURE ONLY Chris Samuelsge [phone]; [facility]       Last Lab Result: Creatinine (mg/dL)       Date                     Value                 07/19/2017               0.95             ----------  Body mass index is 34.97 kg/(m^2).      Patient will be contacted to schedule procedure.     Please be aware that coverage of these services is subject to the terms and limitations of your health insurance plan.  Call member services at your health plan with any benefit or coverage questions.  Any procedures must be performed at a [facility] facility OR coordinated by your clinic's referral office.    Please bring the following with you to your appointment:    (1) Any X-Rays, CTs or MRIs which have been performed.  Contact the facility where they were done to arrange for  prior to your scheduled appointment.    (2) List of current medications   (3) This referral request   (4) Any documents/labs given to you for this referral                  Follow-up notes from your care team   "   Return in about 1 week (around 11/20/2018), or if symptoms worsen or fail to improve and a diabetic med check in 6 months. .      Who to contact     If you have questions or need follow up information about today's clinic visit or your schedule please contact [facility] directly at [phone].  Normal or non-critical lab and imaging results will be communicated to you by MyChart, letter or phone within 4 business days after the clinic has received the results. If you do not hear from us within 7 days, please contact the clinic through [place] or phone. If you have a critical or abnormal lab result, we will notify you by phone as soon as possible.  Submit refill requests through [facility] or call your pharmacy and they will forward the refill request to us. Please allow 3 business days for your refill to be completed.          Additional Information About Your Visit        [facility] Information     [facility] gives you secure access to your electronic health record. If you see a primary care provider, you can also send messages to your care team and make appointments. If you have questions, please call your primary care clinic.  If you do not have a primary care provider, please call [phone] and they will assist you.        Care EveryWhere ID     This is your Care EveryWhere ID. This could be used by other organizations to access your [facility] medical records  [id number]        Your Vitals Were     Pulse Temperature Height Pulse Oximetry BMI (Body Mass Index)       90 98.5  F (36.9  C) (Oral) 5' 8\" (1.727 m) 95% 34.97 kg/m2        Blood Pressure from Last 3 Encounters:   11/13/18 130/62   08/17/18 128/72   08/03/18 124/64    Weight from Last 3 Encounters:   11/13/18 230 lb (104.3 kg)   08/17/18 235 lb (106.6 kg)   08/03/18 235 lb (106.6 kg)              We Performed the Following     Albumin Random Urine Quantitative with Creat Ratio     BASIC METABOLIC PANEL     GASTROENTEROLOGY ADULT REF " PROCEDURE ONLY Chris Vasquez [phone]; [facility]     HEMOGLOBIN A1C     Lipid panel reflex to direct LDL Non-fasting     PROSTATE SPEC ANTIGEN SCREEN          Today's Medication Changes          These changes are accurate as of 11/13/18  2:33 PM.  If you have any questions, ask your nurse or doctor.               These medicines have changed or have updated prescriptions.        Dose/Directions    metFORMIN 1000 MG tablet   Commonly known as:  GLUCOPHAGE   This may have changed:  See the new instructions.   Used for:  Type 2 diabetes mellitus without complication, without long-term current use of insulin (H)   Changed by:  Geovany Perkins MD        Dose:  1000 mg   Take 1 tablet (1,000 mg) by mouth 2 times daily (with meals)   Quantity:  180 tablet   Refills:  1            Where to get your medicines      These medications were sent to [facility] [id number] [facility] - [place], [place] - [address]  [address] [address], [place] [address]     Phone:  [phone]     glimepiride 4 MG tablet    lisinopril 10 MG tablet    metFORMIN 1000 MG tablet    simvastatin 40 MG tablet                Primary Care Provider Office Phone # Fax #    Geovany Perkins [phone]       [address] [address]        Equal Access to Services     SONAL TOM AH: Hadii ravi ku hadasho Soomaali, waaxda luqadaha, qaybta kaalmada adeegyada, waxay stacey haysukumar acevedo. So [facility] [phone].    ATENCIÓN: Si habla español, tiene a smith disposición servicios gratuitos de asistencia lingüística. Llame al [phone].    We comply with applicable federal civil rights laws and [place] laws. We do not discriminate on the basis of race, color, national origin, age, disability, sex, sexual orientation, or gender identity.            Thank you!     Thank you for choosing [facility]  for your care. Our goal is always to provide you with excellent care. Hearing back from  our patients is one way we can continue to improve our services. Please take a few minutes to complete the written survey that you may receive in the mail after your visit with us. Thank you!             Your Updated Medication List - Protect others around you: Learn how to safely use, store and throw away your medicines at www.disposemymeds.org.          This list is accurate as of 11/13/18  2:33 PM.  Always use your most recent med list.                   Brand Name Dispense Instructions for use Diagnosis    aspirin 325 MG tablet      take 325 mg by mouth as needed.        blood glucose lancets standard    no brand specified    100 each    Use to test blood sugar 1-2 times daily or as directed.    Controlled type 2 diabetes mellitus without complication, without long-term current use of insulin (H)       blood glucose monitoring lancets           * blood glucose monitoring meter device kit     1 kit    Use to test blood sugars 2 times daily or as directed.    Type 2 diabetes mellitus without complication (H)       * blood glucose monitoring meter device kit    no brand specified    1 kit    Use to test blood sugar 1-2 times daily or as directed.  With test strips and all needed suplies    Controlled type 2 diabetes mellitus without complication, without long-term current use of insulin (H)       glimepiride 4 MG tablet    AMARYL    180 tablet    Take 2 tablets (8 mg) by mouth every morning (before breakfast)    Type 2 diabetes mellitus without complication, without long-term current use of insulin (H)       lisinopril 10 MG tablet    PRINIVIL/ZESTRIL    90 tablet    Take 1 tablet (10 mg) by mouth daily    Essential hypertension with goal blood pressure less than 140/90       metFORMIN 1000 MG tablet    GLUCOPHAGE    180 tablet    Take 1 tablet (1,000 mg) by mouth 2 times daily (with meals)    Type 2 diabetes mellitus without complication, without long-term current use of insulin (H)       naproxen 500 MG tablet     NAPROSYN    60 tablet    TAKE 1 TABLET (500 MG) BY MOUTH 2 TIMES DAILY AS NEEDED FOR MODERATE PAIN - TAKE WITH FOOD    Tendinitis involving left hip abductors, Deltoid tendonitis of left shoulder, Acute pain of left knee       ONETOUCH ULTRA test strip   Generic drug:  blood glucose monitoring     50 strip    TEST 1-2 TIMES DAILY AS DIRECTED    Type 2 diabetes mellitus without complication (H)       simvastatin 40 MG tablet    ZOCOR    90 tablet    Take 1 tablet (40 mg) by mouth At Bedtime    Hyperlipidemia LDL goal <70       tadalafil 20 MG tablet    CIALIS    3 tablet    Take 0.5-1 tablets (10-20 mg) by mouth daily as needed for erectile dysfunction Never use with nitroglycerin, terazosin or doxazosin.    Erectile dysfunction       * Notice:  This list has 2 medication(s) that are the same as other medications prescribed for you. Read the directions carefully, and ask your doctor or other care provider to review them with you.

## 2018-11-14 LAB
CREAT UR-MCNC: 37 MG/DL
MICROALBUMIN UR-MCNC: 5 MG/L
MICROALBUMIN/CREAT UR: 13.64 MG/G CR (ref 0–17)

## 2018-11-15 NOTE — PROGRESS NOTES
Dear Demond,    Here is a summary of your recent test results:  -Microalbumin (urine protein) test is normal.  ADVISE: rechecking this annually.    For additional lab test information, labtestsonline.org is an excellent reference.    In addition, here is a list of due or overdue Health Maintenance reminders:  Colon Cancer Screening - every 10 years. due on 01/20/2002  Wellness Visit with your Primary Provider - yearly due on 07/12/2017  A1C (Diabetes) Lab - every 3 months due on 05/12/2018  Basic Metabolic Lab - yearly due on 07/19/2018  Cholesterol Lab - yearly due on 07/19/2018  Prostate Test (PSA) - yearly due on 07/19/2018    Please call us at [phone] (or use [facility]) to address the above recommendations if needed.           Thank you very much for trusting me and [facility].     Healthy regards,  Dinh Perkins MD

## 2018-11-30 NOTE — PROGRESS NOTES
[address] [address]  [phone]  Dept: [phone]    PRE-OP EVALUATION:  Today's date: 2018    Francisco Thornton (: 1950) presents for pre-operative evaluation assessment as requested by Dr. [facility].  He requires evaluation and anesthesia risk assessment prior to undergoing surgery/procedure for treatment of cataract .    Proposed Surgery/ Procedure: Cataract Left  Date of Surgery/ Procedure: 2018- right eye 2018  Time of Surgery/ Procedure: [facility]  Hospital/Surgical Facility: [facility]  Fax number for surgical facility: [phone]  Primary Physician: Geovany Perkins  Type of Anesthesia Anticipated: MAC    Patient has a Health Care Directive or Living Will:  NO    1. NO - Do you have a history of heart attack, stroke, stent, bypass or surgery on an artery in the head, neck, heart or legs?  2. NO - Do you ever have any pain or discomfort in your chest?  3. NO - Do you have a history of  Heart Failure?  4. NO - Are you troubled by shortness of breath when: walking on the level, up a slight hill or at night?  5. NO - Do you currently have a cold, bronchitis or other respiratory infection?  6. NO - Do you have a cough, shortness of breath or wheezing?  7. NO - Do you sometimes get pains in the calves of your legs when you walk?  8. NO - Do you or anyone in your family have previous history of blood clots?  9. NO - Do you or does anyone in your family have a serious bleeding problem such as prolonged bleeding following surgeries or cuts?  10. NO - Have you ever had problems with anemia or been told to take iron pills?  11. NO - Have you had any abnormal blood loss such as black, tarry or bloody stools, or abnormal vaginal bleeding?  12. NO - Have you ever had a blood transfusion?  13. NO - Have you or any of your relatives ever had problems with anesthesia?  14. YES - DO YOU HAVE SLEEP APNEA, EXCESSIVE SNORING OR DAYTIME  DROWSINESS? snore  15. NO - Do you have any prosthetic heart valves?  16. NO - Do you have prosthetic joints?        HPI:     HPI related to upcoming procedure: cataracts    DIABETES - Patient has a longstanding history of DiabetesType Type II . Patient is being treated with diet, oral agents and exercise and denies significant side effects. Control has been good. Complicating factors include but are not limited to: hypertension and hyperlipidemia.                                                                                                                            .  HYPERLIPIDEMIA - Patient has a long history of significant Hyperlipidemia requiring medication for treatment with recent good control. Patient reports no problems or side effects with the medication.                                                                                                                                                       .  HYPERTENSION - Patient has longstanding history of HTN , currently denies any symptoms referable to elevated blood pressure. Specifically denies chest pain, palpitations, dyspnea, orthopnea, PND or peripheral edema. Blood pressure readings have been in normal range. Current medication regimen is as listed below. Patient denies any side effects of medication.                                                                                                                                                                                          .    MEDICAL HISTORY:     Patient Active Problem List    Diagnosis Date Noted     Diabetic polyneuropathy associated with type 2 diabetes mellitus (H) 07/12/2016     Priority: High     Hyperlipidemia LDL goal <70      Priority: High     Hypertension goal BP (blood pressure) < 140/90      Priority: High     Morbid obesity (H) 10/20/2015     Priority: High     Controlled type 2 diabetes mellitus without complication, without long-term current use of insulin (H)  06/20/2013     Priority: High     Degeneration of lumbar or lumbosacral intervertebral disc 02/12/2015     Priority: Medium     Spondylolisthesis of lumbar region 02/11/2015     Priority: Medium     BPH (benign prostatic hyperplasia) 06/20/2013     Priority: Medium     Advanced directives, counseling/discussion 12/31/2012     Priority: Medium     Discussed advance care planning with patient; information given to patient to review. 12/31/2012       Jennyfer See CMA            Past Medical History:   Diagnosis Date     Controlled type 2 diabetes mellitus without complication, without long-term current use of insulin (H) 06/20/2013     Diabetes (H)      Hyperlipidemia LDL goal <70      Hypertension goal BP (blood pressure) < 140/90      Obesity, unspecified      Prostatitis, unspecified      Unspecified sinusitis (chronic)      Past Surgical History:   Procedure Laterality Date     C NONSPECIFIC PROCEDURE      S/P vasectomy     Current Outpatient Prescriptions   Medication Sig Dispense Refill     aspirin 325 MG tablet take 325 mg by mouth as needed.       glimepiride (AMARYL) 4 MG tablet Take 2 tablets (8 mg) by mouth every morning (before breakfast) 180 tablet 1     lisinopril (PRINIVIL/ZESTRIL) 10 MG tablet Take 1 tablet (10 mg) by mouth daily 90 tablet 1     metFORMIN (GLUCOPHAGE) 1000 MG tablet Take 1 tablet (1,000 mg) by mouth 2 times daily (with meals) 180 tablet 1     simvastatin (ZOCOR) 40 MG tablet Take 1 tablet (40 mg) by mouth At Bedtime 90 tablet 1     blood glucose (NO BRAND SPECIFIED) lancets standard Use to test blood sugar 1-2 times daily or as directed. 100 each 11     blood glucose monitoring (ACCU-CHEK JERAD PLUS) meter device kit Use to test blood sugars 2 times daily or as directed. 1 kit 0     blood glucose monitoring (ACCU-CHEK FASTCLIX) lancets   6     blood glucose monitoring (NO BRAND SPECIFIED) meter device kit Use to test blood sugar 1-2 times daily or as directed.  With test strips and  "all needed suplies 1 kit 0     naproxen (NAPROSYN) 500 MG tablet TAKE 1 TABLET (500 MG) BY MOUTH 2 TIMES DAILY AS NEEDED FOR MODERATE PAIN - TAKE WITH FOOD 60 tablet 0     ONETOUCH ULTRA test strip TEST 1-2 TIMES DAILY AS DIRECTED 50 strip 3     OTC products: Aspirin    No Known Allergies   Latex Allergy: NO    Social History   Substance Use Topics     Smoking status: Former Smoker     Packs/day: 1.00     Years: 25.00     Types: Cigarettes     Quit date: 6/1/2012     Smokeless tobacco: Never Used      Comment: 1 1/2 PPD     Alcohol use No     History   Drug Use No       REVIEW OF SYSTEMS:   Constitutional, neuro, ENT, endocrine, pulmonary, cardiac, gastrointestinal, genitourinary, musculoskeletal, integument and psychiatric systems are negative, except as otherwise noted.    EXAM:   /80  Pulse 76  Temp 98.6  F (37  C) (Oral)  Ht 5' 8\" (1.727 m)  Wt 230 lb (104.3 kg)  SpO2 97%  BMI 34.97 kg/m2    GENERAL APPEARANCE: healthy, alert and no distress     EYES: EOMI,  PERRL     HENT: ear canals and TM's normal and nose and mouth without ulcers or lesions     NECK: no adenopathy, no asymmetry, masses, or scars and thyroid normal to palpation     RESP: lungs clear to auscultation - no rales, rhonchi or wheezes     CV: regular rates and rhythm, normal S1 S2, no S3 or S4 and no murmur, click or rub     ABDOMEN:  soft, nontender, no HSM or masses and bowel sounds normal     MS: extremities normal- no gross deformities noted, no evidence of inflammation in joints, FROM in all extremities.     SKIN: no suspicious lesions or rashes     NEURO: Normal strength and tone, sensory exam grossly normal, mentation intact and speech normal     PSYCH: mentation appears normal. and affect normal/bright     LYMPHATICS: No cervical adenopathy    DIAGNOSTICS:   EKG: appears normal, BSR, normal axis, normal intervals, no acute ST/T changes c/w ischemia, no LVH by voltage criteria, unchanged from previous tracings    Recent Labs "   Lab Test  02/12/18   1643  10/25/17   0729  07/19/17   0748  06/30/16   0738  12/30/15   1547   HGB   --    --   16.0  16.4  16.1   PLT   --    --   217  196  208   NA   --    --   138   --   134   POTASSIUM   --    --   5.0   --   4.1   CR   --    --   0.95   --   0.87   A1C  7.5*  7.4*  8.3*  6.4*   --      Lab Results   Component Value Date    A1C 7.1 12/01/2018    A1C 7.5 02/12/2018    A1C 7.4 10/25/2017    A1C 8.3 07/19/2017    A1C 6.4 06/30/2016          IMPRESSION:   Reason for surgery/procedure:     ICD-10-CM    1. Preop general physical exam Z01.818 EKG 12-lead complete w/read - Clinics   2. Cataract of both eyes, unspecified cataract type H26.9    3. Controlled type 2 diabetes mellitus without complication, without long-term current use of insulin (H) E11.9 EKG 12-lead complete w/read - Clinics     Hemoglobin A1c   4. Hypertension goal BP (blood pressure) < 140/90 I10 Basic metabolic panel   5. Morbid obesity (H) E66.01    6. Hyperlipidemia LDL goal <70 E78.5 Lipid panel reflex to direct LDL Non-fasting   7. Screening for colon cancer Z12.11 Fecal colorectal cancer screen (FIT)   8. Screening PSA (prostate specific antigen) Z12.5 Prostate spec antigen screen        The proposed surgical procedure is considered LOW risk.    REVISED CARDIAC RISK INDEX  The patient has the following serious cardiovascular risks for perioperative complications such as (MI, PE, VFib and 3  AV Block):  No serious cardiac risks  INTERPRETATION: 0 risks: Class I (very low risk - 0.4% complication rate)    The patient has the following additional risks for perioperative complications:  No identified additional risks  Morbid obesity      RECOMMENDATIONS:       --Patient is to take all scheduled medications on the day of surgery EXCEPT for modifications listed below.    Diabetes Medication Use  -----Hold usual oral and non-insulin diabetic meds (e.g. Metformin, Actos, Glipizide) while NPO.       APPROVAL GIVEN to proceed with  proposed procedure, without further diagnostic evaluation       Signed Electronically by: Geovany Perkins MD    Copy of this evaluation report is provided to requesting physician.    [facility] Preop Guidelines    Revised Cardiac Risk Index

## 2018-12-01 ENCOUNTER — OFFICE VISIT (OUTPATIENT)
Dept: FAMILY MEDICINE | Facility: CLINIC | Age: 68
End: 2018-12-01
Payer: COMMERCIAL

## 2018-12-01 VITALS
DIASTOLIC BLOOD PRESSURE: 80 MMHG | WEIGHT: 230 LBS | OXYGEN SATURATION: 97 % | TEMPERATURE: 98.6 F | HEART RATE: 76 BPM | SYSTOLIC BLOOD PRESSURE: 126 MMHG | BODY MASS INDEX: 34.86 KG/M2 | HEIGHT: 68 IN

## 2018-12-01 DIAGNOSIS — Z12.5 SCREENING PSA (PROSTATE SPECIFIC ANTIGEN): ICD-10-CM

## 2018-12-01 DIAGNOSIS — E78.5 HYPERLIPIDEMIA LDL GOAL <70: ICD-10-CM

## 2018-12-01 DIAGNOSIS — E66.01 MORBID OBESITY (H): ICD-10-CM

## 2018-12-01 DIAGNOSIS — I10 HYPERTENSION GOAL BP (BLOOD PRESSURE) < 140/90: ICD-10-CM

## 2018-12-01 DIAGNOSIS — Z12.11 SCREENING FOR COLON CANCER: ICD-10-CM

## 2018-12-01 DIAGNOSIS — H26.9 CATARACT OF BOTH EYES, UNSPECIFIED CATARACT TYPE: ICD-10-CM

## 2018-12-01 DIAGNOSIS — E11.9 CONTROLLED TYPE 2 DIABETES MELLITUS WITHOUT COMPLICATION, WITHOUT LONG-TERM CURRENT USE OF INSULIN (H): ICD-10-CM

## 2018-12-01 DIAGNOSIS — Z01.818 PREOP GENERAL PHYSICAL EXAM: Primary | ICD-10-CM

## 2018-12-01 LAB
ANION GAP SERPL CALCULATED.3IONS-SCNC: 10 MMOL/L (ref 3–14)
BUN SERPL-MCNC: 19 MG/DL (ref 7–30)
CALCIUM SERPL-MCNC: 9.7 MG/DL (ref 8.5–10.1)
CHLORIDE SERPL-SCNC: 106 MMOL/L (ref 94–109)
CHOLEST SERPL-MCNC: 172 MG/DL
CO2 SERPL-SCNC: 22 MMOL/L (ref 20–32)
CREAT SERPL-MCNC: 0.9 MG/DL (ref 0.66–1.25)
GFR SERPL CREATININE-BSD FRML MDRD: 84 ML/MIN/1.7M2
GLUCOSE SERPL-MCNC: 154 MG/DL (ref 70–99)
HBA1C MFR BLD: 7.1 % (ref 0–5.6)
HDLC SERPL-MCNC: 32 MG/DL
LDLC SERPL CALC-MCNC: 95 MG/DL
NONHDLC SERPL-MCNC: 140 MG/DL
POTASSIUM SERPL-SCNC: 4.3 MMOL/L (ref 3.4–5.3)
PSA SERPL-ACNC: 1.31 UG/L (ref 0–4)
SODIUM SERPL-SCNC: 138 MMOL/L (ref 133–144)
TRIGL SERPL-MCNC: 224 MG/DL

## 2018-12-01 PROCEDURE — G0103 PSA SCREENING: HCPCS | Performed by: FAMILY MEDICINE

## 2018-12-01 PROCEDURE — 80061 LIPID PANEL: CPT | Performed by: FAMILY MEDICINE

## 2018-12-01 PROCEDURE — 80048 BASIC METABOLIC PNL TOTAL CA: CPT | Performed by: FAMILY MEDICINE

## 2018-12-01 PROCEDURE — 99215 OFFICE O/P EST HI 40 MIN: CPT | Performed by: FAMILY MEDICINE

## 2018-12-01 PROCEDURE — 36415 COLL VENOUS BLD VENIPUNCTURE: CPT | Performed by: FAMILY MEDICINE

## 2018-12-01 PROCEDURE — 93000 ELECTROCARDIOGRAM COMPLETE: CPT | Performed by: FAMILY MEDICINE

## 2018-12-01 PROCEDURE — 83036 HEMOGLOBIN GLYCOSYLATED A1C: CPT | Performed by: FAMILY MEDICINE

## 2018-12-01 NOTE — MR AVS SNAPSHOT
After Visit Summary   12/1/2018    Francisco Thornton    MRN: [id number]           Patient Information     Date Of Birth          1950        Visit Information        Provider Department      12/1/2018 8:00 AM Geovany Perkins MD [facility] [place]        Today's Diagnoses     Preop general physical exam    -  1    Cataract of both eyes, unspecified cataract type        Controlled type 2 diabetes mellitus without complication, without long-term current use of insulin (H)        Hypertension goal BP (blood pressure) < 140/90        Morbid obesity (H)        Hyperlipidemia LDL goal <70        Screening for colon cancer        Screening PSA (prostate specific antigen)          Care Instructions      Before Your Surgery      Call your surgeon if there is any change in your health. This includes signs of a cold or flu (such as a sore throat, runny nose, cough, rash or fever).    Do not smoke, drink alcohol or take over the counter medicine (unless your surgeon or primary care doctor tells you to) for the 24 hours before and after surgery.    If you take prescribed drugs: Follow your doctor s orders about which medicines to take and which to stop until after surgery.    Eating and drinking prior to surgery: follow the instructions from your surgeon    Take a shower or bath the night before surgery. Use the soap your surgeon gave you to gently clean your skin. If you do not have soap from your surgeon, use your regular soap. Do not shave or scrub the surgery site.  Wear clean pajamas and have clean sheets on your bed.           Follow-ups after your visit        Follow-up notes from your care team     Return in about 6 months (around 6/1/2019) for Medication Recheck.      Future tests that were ordered for you today     Open Future Orders        Priority Expected Expires Ordered    Fecal colorectal cancer screen (FIT) Routine 12/22/2018 2/23/2019 12/1/2018            Who to contact     If you  "have questions or need follow up information about today's clinic visit or your schedule please contact [facility] directly at [phone].  Normal or non-critical lab and imaging results will be communicated to you by MyChart, letter or phone within 4 business days after the clinic has received the results. If you do not hear from us within 7 days, please contact the clinic through [email or web address] or phone. If you have a critical or abnormal lab result, we will notify you by phone as soon as possible.  Submit refill requests through [employer] or call your pharmacy and they will forward the refill request to us. Please allow 3 business days for your refill to be completed.          Additional Information About Your Visit        [email or web address] Information     [employer] gives you secure access to your electronic health record. If you see a primary care provider, you can also send messages to your care team and make appointments. If you have questions, please call your primary care clinic.  If you do not have a primary care provider, please call [phone] and they will assist you.        Care EveryWhere ID     This is your Care EveryWhere ID. This could be used by other organizations to access your [facility] medical records  [id number]        Your Vitals Were     Pulse Temperature Height Pulse Oximetry BMI (Body Mass Index)       76 98.6  F (37  C) (Oral) 5' 8\" (1.727 m) 97% 34.97 kg/m2        Blood Pressure from Last 3 Encounters:   12/01/18 126/80   11/13/18 130/62   08/17/18 128/72    Weight from Last 3 Encounters:   12/01/18 230 lb (104.3 kg)   11/13/18 230 lb (104.3 kg)   08/17/18 235 lb (106.6 kg)              We Performed the Following     Basic metabolic panel     EKG 12-lead complete w/read - Clinics     Hemoglobin A1c     Lipid panel reflex to direct LDL Non-fasting     Prostate spec antigen screen        Primary Care Provider Office Phone # Fax #    Geovany Perkins [phone]       [phone] [facility] [address]        Equal Access to Services     SONAL TOM : Hadii aad ku hadgabbikalee Hammer, waherbertda yamilet, qaybta jose juanmajusten nam, nolan coffmanlosrivka acevedo. So [facility] [phone].    ATENCIÓN: Si habla español, tiene a smith disposición servicios gratuitos de asistencia lingüística. Llame al [phone].    We comply with applicable federal civil rights laws and [place] laws. We do not discriminate on the basis of race, color, national origin, age, disability, sex, sexual orientation, or gender identity.            Thank you!     Thank you for choosing [facility]  for your care. Our goal is always to provide you with excellent care. Hearing back from our patients is one way we can continue to improve our services. Please take a few minutes to complete the written survey that you may receive in the mail after your visit with us. Thank you!             Your Updated Medication List - Protect others around you: Learn how to safely use, store and throw away your medicines at www.disposemymeds.org.          This list is accurate as of 12/1/18  8:49 AM.  Always use your most recent med list.                   Brand Name Dispense Instructions for use Diagnosis    aspirin 325 MG tablet    ASA     take 325 mg by mouth as needed.        blood glucose lancets standard    NO BRAND SPECIFIED    100 each    Use to test blood sugar 1-2 times daily or as directed.    Controlled type 2 diabetes mellitus without complication, without long-term current use of insulin (H)       blood glucose monitoring lancets           * blood glucose monitoring meter device kit     1 kit    Use to test blood sugars 2 times daily or as directed.    Type 2 diabetes mellitus without complication (H)       * blood glucose monitoring meter device kit    NO BRAND SPECIFIED    1 kit    Use to test blood sugar 1-2 times daily or as directed.  With test strips and all needed suplies     Controlled type 2 diabetes mellitus without complication, without long-term current use of insulin (H)       glimepiride 4 MG tablet    AMARYL    180 tablet    Take 2 tablets (8 mg) by mouth every morning (before breakfast)    Type 2 diabetes mellitus without complication, without long-term current use of insulin (H)       lisinopril 10 MG tablet    PRINIVIL/ZESTRIL    90 tablet    Take 1 tablet (10 mg) by mouth daily    Essential hypertension with goal blood pressure less than 140/90       metFORMIN 1000 MG tablet    GLUCOPHAGE    180 tablet    Take 1 tablet (1,000 mg) by mouth 2 times daily (with meals)    Type 2 diabetes mellitus without complication, without long-term current use of insulin (H)       naproxen 500 MG tablet    NAPROSYN    60 tablet    TAKE 1 TABLET (500 MG) BY MOUTH 2 TIMES DAILY AS NEEDED FOR MODERATE PAIN - TAKE WITH FOOD    Tendinitis involving left hip abductors, Deltoid tendonitis of left shoulder, Acute pain of left knee       ONETOUCH ULTRA test strip   Generic drug:  blood glucose monitoring     50 strip    TEST 1-2 TIMES DAILY AS DIRECTED    Type 2 diabetes mellitus without complication (H)       simvastatin 40 MG tablet    ZOCOR    90 tablet    Take 1 tablet (40 mg) by mouth At Bedtime    Hyperlipidemia LDL goal <70       * Notice:  This list has 2 medication(s) that are the same as other medications prescribed for you. Read the directions carefully, and ask your doctor or other care provider to review them with you.

## 2018-12-05 NOTE — PROGRESS NOTES
Dear Demond,    Here is a summary of your recent test results:  -PSA (prostate specific antigen) test is normal.  This indicates a low likelihood of prostate cancer.  ADVISE: rechecking this in 1 year.  -Cholesterol levels are at your goal levels.  ADVISE: continuing your medication, a regular exercise program with at least 150 minutes of aerobic exercise per week, and eating a low saturated fat/low carbohydrate diet.  Also, you should recheck this fasting cholesterol panel in 12 months.  -Kidney function (GFR) is normal.  -Sodium is normal.  -Potassium is normal.  -Calcium is normal.  -Glucose is elevated due to your diabetes.  -A1C (test of diabetes control the last 2-3 months) is at your goal. Please continue with your current plan. Also, you should make an appointment to see me and recheck your A1C test in 6 months.     For additional lab test information, labtestsonline.org is an excellent reference.    In addition, here is a list of due or overdue Health Maintenance reminders:  Colon Cancer Screening - every 10 years. due on 01/20/2002    Please call us at [phone] (or use [employer]) to address the above recommendations if needed.           Thank you very much for trusting me and [facility] [place].     Healthy regards,  Dinh Perkins MD

## 2019-03-04 ENCOUNTER — TELEPHONE (OUTPATIENT)
Dept: FAMILY MEDICINE | Facility: CLINIC | Age: 69
End: 2019-03-04

## 2019-03-04 NOTE — TELEPHONE ENCOUNTER
Attempt #1  Called patient @ [phone] - Left a non-detailed message to call back and speak with any triage nurse.    Ailyn Lopez RN  [place] [facility]

## 2019-03-05 NOTE — TELEPHONE ENCOUNTER
Attempt # 2    Called #   Telephone Information:   Mobile [phone]       Left a non detailed VM     Aundrea Schneider RN, BSN  [place] Triage

## 2019-03-05 NOTE — TELEPHONE ENCOUNTER
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 there is pain     Description:   Location: left shoulder and down left elbow  Character: Sharp when he lifts things - even listing a cup of coffee hurts     Intensity: moderate, severe    Progression of Symptoms: same    Accompanying Signs & Symptoms:  Other symptoms: weakness when he does have the pain   denies: CP, SOB  radiation of pain, numbness, tingling, warmth, swelling and redness    History:   Previous similar pain: no       Precipitating factors:   Trauma or overuse: no     Alleviating factors:  Improved by: rest/inactivity and left over hydrocodone     Therapies Tried and outcome: see above     Advised pt he is okay to wait  until Monday but if things worsen he should be seen sooner.   Reviewed worsening symptoms with pt  Patient stated an understanding and agreed with plan.\      Aundrea Schneider RN, BSN  [place] Triage            "

## 2019-03-11 ENCOUNTER — OFFICE VISIT (OUTPATIENT)
Dept: FAMILY MEDICINE | Facility: CLINIC | Age: 69
End: 2019-03-11
Payer: COMMERCIAL

## 2019-03-11 VITALS
BODY MASS INDEX: 34.71 KG/M2 | HEART RATE: 84 BPM | DIASTOLIC BLOOD PRESSURE: 70 MMHG | HEIGHT: 68 IN | SYSTOLIC BLOOD PRESSURE: 118 MMHG | WEIGHT: 229 LBS | TEMPERATURE: 98.8 F | OXYGEN SATURATION: 94 %

## 2019-03-11 DIAGNOSIS — E66.01 MORBID OBESITY (H): ICD-10-CM

## 2019-03-11 DIAGNOSIS — S46.002A ROTATOR CUFF INJURY, LEFT, INITIAL ENCOUNTER: Primary | ICD-10-CM

## 2019-03-11 DIAGNOSIS — E11.40 TYPE 2 DIABETES MELLITUS WITH DIABETIC NEUROPATHY, WITHOUT LONG-TERM CURRENT USE OF INSULIN (H): ICD-10-CM

## 2019-03-11 PROCEDURE — 99213 OFFICE O/P EST LOW 20 MIN: CPT | Performed by: FAMILY MEDICINE

## 2019-03-11 ASSESSMENT — MIFFLIN-ST. JEOR: SCORE: 1788.24

## 2019-03-11 NOTE — PROGRESS NOTES
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 there is pain     Description:   Location: left shoulder and down left elbow  Character: Sharp when he lifts things - even listing a cup of coffee hurts     Intensity: moderate, severe    Progression of Symptoms: same    Accompanying Signs & Symptoms:  Other symptoms: weakness when he does have the pain   denies: CP, SOB  radiation of pain, numbness, tingling, warmth, swelling and redness    History:   Previous similar pain: no       Precipitating factors:   Trauma or overuse: no     Alleviating factors:  Improved by: rest/inactivity and left over hydrocodone      Therapies Tried and outcome: see above      Advised pt he is okay to wait  until Monday but if things worsen he should be seen sooner.   Reviewed worsening symptoms with pt  Patient stated an understanding and agreed with plan.\        Aundrea Schneider RN, BSN  [place] Triage                   No changes since talking to nurse     Left Shoulder Pain-  Left arm pain near bicep, sometimes will travel down the arm.  Is usually a dull pain, will become intense at times.  Moving and using the arm makes it worse. The pain is waking him up at night.  No numbness in fingers. Uses his arms for tightening motions at work.        Problem list and histories reviewed & adjusted, as indicated.  Additional history: as documented      Reviewed and updated as needed this visit by clinical staff  Tobacco  Allergies  Meds  Med Hx  Surg Hx  Fam Hx  Soc Hx      Reviewed and updated as needed this visit by Provider         ROS:  Constitutional, HEENT, cardiovascular, pulmonary, GI, , musculoskeletal, neuro, skin, endocrine and psych systems are negative, except as otherwise noted.    This document " "serves as a record of the service and decisions personally performed and made by Geovany Perkins MD. It was created on his behalf by Abhishek Roach, a trained medical scribe. The creation of this document is based the provider's statements to the medical scribe.    Bradley Roach 4:17 PM, March 11, 2019  OBJECTIVE:                                                      /70   Pulse 84   Temp 98.8  F (37.1  C) (Oral)   Ht 1.727 m (5' 8\")   Wt 103.9 kg (229 lb)   SpO2 94%   BMI 34.82 kg/m   Body mass index is 34.82 kg/m .     NECK: no tenderness, no adenopathy, no asymmetry, no masses, no stiffness; thyroid- normal to palpation  MS: Left shoulder -Internal rotation of his shoulder to his lower back  Tender at the insertion of the supraspinatus, Positive impingement sign, bicep nontender and FROM.     ASSESSMENT/PLAN:                                                      Francisco was seen today for musculoskeletal problem.    Diagnoses and all orders for this visit:    Rotator cuff injury, left, initial encounter - rest, stretching and eventual strengthening.  Home exercises given.    Morbid obesity (H) - diet     Type 2 diabetes mellitus with diabetic neuropathy, without long-term current use of insulin (H) - - stable - continue medication(s)       Risks, benefits and alternatives of treatments discussed. Plan agreed on.      Followup: Return in about 3 weeks (around 4/1/2019). prn    See patient instructions.     The information in this document, created by the medical scribe for me, accurately reflects the services I personally performed and the decisions made by me. I have reviewed and approved this document for accuracy prior to leaving the patient care area.  March 11, 2019 4:16 PM          Dinh Perkins MD   Pager: [phone]  "

## 2019-03-11 NOTE — PATIENT INSTRUCTIONS
Patient Education     Understanding Rotator Cuff Tendonitis    Tendons are tough tissues that connect muscles to bone. A group of 4 muscles and their tendons form a  cuff  around the head of the upper arm bone. This is called the rotator cuff. It connects the upper arm to the shoulder blade. It gives the shoulder joint stability and strength.  If tendons are injured or strained, they may become irritated and swollen (inflamed). This is called tendonitis. Rotator cuff tendonitis may cause shoulder pain and loss of function.  What causes rotator cuff tendonitis?  Tendonitis results when the rotator cuff tendons are injured or overworked. The most common cause of injury is repetitive overhead activities. These can be work-related activities such as reaching, pushing, or lifting. Or they can be sports-related activities such as throwing, swimming, or lifting weights.  Symptoms of rotator cuff tendonitis  Pain on the side of the upper arm is the most common symptom. Pain may get worse with overhead movements or when you raise the arm above shoulder level. It may also hurt to lie on the shoulder at night.  Treatment for rotator cuff tendonitis  Treatment may include the following:    Active rest. This lets the rotator cuff heal. Active rest means using your arm and shoulder, but avoiding activities that cause pain, such as reaching overhead or sleeping on the shoulder.    Cold packs. Putting ice packs on the shoulder helps reduce swelling and relieve pain.    Pain medicines. Prescription or over-the-counter pain medicines can help relieve pain and swelling.    Arm and shoulder exercises. These help keep the shoulder joint mobile as it heals. They also help improve the strength of muscles around the joint.  Possible complications  It might be tempting to stop using your shoulder completely to avoid pain. But doing so may lead to a condition called  frozen shoulder.  To help prevent this, following instructions you are  given for active rest and for doing exercises to help your shoulder heal.   When to call your healthcare provider  Call your healthcare provider right away if you have any of these:    Fever of 100.4 F (38 C) or higher, or as directed    Symptoms that don t get better, or get worse    New symptoms   Date Last Reviewed: 3/10/2016    7093-9854 [employer]. [address] [place] [address]. All rights reserved. This information is not intended as a substitute for professional medical care. Always follow your healthcare professional's instructions.           Patient Education     Shoulder Abduction (Flexibility)    1. Stand up straight. Or lie on your back on the floor with legs straight. Hold a broomstick horizontally. Cup the top of the broomstick with your right hand. Hold the broomstick just above the broom with your left hand, palm facing down.  2. Push the broomstick to the right with your left hand, raising your right arm up. Raise it only as high as feels comfortable.  3. Hold for a few seconds. Return to the starting position.  4. Repeat 3 to 5 times, or as instructed. Build up to holding each stretch for 10 to 30 seconds.     Tip: If you don t have a broom, you can also do this exercise with a cane, mop, or yardstick.      Date Last Reviewed: 3/10/2016    8787-7975 [employer]. [address] [place] [address]. All rights reserved. This information is not intended as a substitute for professional medical care. Always follow your healthcare professional's instructions.           Patient Education     Exercises for Shoulder Flexibility: Pendulum Exercise      Improving your flexibility can reduce pain. Stretching exercises also can help increase your range of pain-free motion. Breathe normally when you exercise. And try to use smooth, fluid movements.  Follow any special instructions you are given. If you feel pain, stop the exercise. If the pain continues after  stopping, call your healthcare provider.  Here are the steps for the pendulum exercise:     Lean over with your good arm supported on a table or chair.    Relax the arm on the painful side, letting it hang straight down.    Slowly begin to swing the relaxed arm. Move it in a small [place], gradually making it bigger if you can. Then reverse the direction. Next, move it backward and forward. Finally, move it side to side.     Note: Spend about 5 minutes doing the exercise, 3 times a day. Change direction after 1 minute of motion.   Date Last Reviewed: 12/1/2017 2000-2018 [employer]. [address] [place], [place] [id number]. All rights reserved. This information is not intended as a substitute for professional medical care. Always follow your healthcare professional's instructions.           Patient Education     Exercises for Shoulder Flexibility: External Rotation    This stretch can help restore shoulder flexibility and relieve pain over time. When stretching, be sure to breathe deeply. Follow any special instructions from your healthcare provider or physical therapist:  1.  a doorway. Grasp the doorjamb with the hand on the frozen side. Your arm should be bent.  2. With the other hand, hold the elbow on the side with the involved frozen (stiff) shoulder firmly against your body.  3. Standing in the same spot, rotate your body away from the doorjamb. Stop when you feel the stretch in the shoulder. At first, try to hold the stretch for 5 seconds.  4. Work up to doing 3 sets of this stretch, 3 times a day. Work up to holding the stretch for 30 to 60 seconds.  Note: Keep your arms as still as you can. Over time, rotate your body a little more to enhance the stretch. But be careful not to twist your back.  Frozen shoulder  Frozen shoulder is another name for adhesive capsulitis, which causes restricted movement in the shoulder. If you have frozen shoulder, this stretch may cause discomfort,  especially when you first get started. A few months may pass before you achieve the results you want. But once your shoulder heals, it rarely becomes frozen again. So stick to your stretching program. If you have any questions, be sure to ask your healthcare provider.   Date Last Reviewed: 3/1/2018    8894-7653 [employer]. [address] [place] [address], [place] [address]. All rights reserved. This information is not intended as a substitute for professional medical care. Always follow your healthcare professional's instructions.           Patient Education     Exercises for Shoulder Flexibility: Internal Rotation    This stretch can help restore shoulder flexibility and relieve pain over time. When stretching, be sure to breathe deeply. Follow any special instructions from your healthcare provider or physical therapist.  1. While seated, move the arm on the side you want to stretch toward the middle of your back. The palm of your hand should face out.  2. Cup your other hand under the hand that s behind your back. Gently push your cupped hand upward until you feel the stretch in the shoulder. Try to hold the stretch for 5 seconds.  3. Work up to doing 3 sets of this stretch, 3 times a day. Work up to holding the stretch for 30 to 60 seconds.  Note: Keep your back straight. It s OK if your hand can t reach the middle of your back. Instead, start the stretch with your hand as close as you can get it to the middle of your back.  Frozen shoulder  Frozen shoulder is another name for adhesive capsulitis. This causes restricted movement in the shoulder. If you have frozen shoulder, this stretch may cause discomfort, especially when you first get started. A few months may pass before you achieve the results you want. But once your shoulder heals, it rarely becomes frozen again. So stick to your stretching program. If you have any questions, be sure to ask your healthcare provider.   Date Last Reviewed: 12/1/2017     2923-1161 [employer]. [address] [place] [address]. All rights reserved. This information is not intended as a substitute for professional medical care. Always follow your healthcare professional's instructions.           Patient Education     Exercises for Shoulder Flexibility: Adduction (Reaching Across)    This stretch can help restore shoulder flexibility and relieve pain over time. When stretching, be sure to breathe deeply. And follow any special instructions from your doctor or physical therapist:  1. Put the hand from the side you want to stretch on your opposite shoulder. Your elbow should point away from your body. Try to raise your elbow as close to shoulder height as you can.  2. With your other hand, push the raised elbow toward the opposite shoulder. Avoid turning your head. Stop when you feel the stretch. Try to hold the stretch for 5 seconds.  3. Work up to doing 3 sets of this stretch, 3 times a day. Work up to holding the stretch for 30 to 60 seconds.  Note: Be sure to push your elbow across your chest, not up toward your chin. Over time, try to push your elbow farther across your chest to enhance the stretch.  Frozen shoulder  Frozen shoulder is another name for adhesive capsulitis, which causes restricted movement in the shoulder. If you have frozen shoulder, this stretch may cause discomfort, especially when you first get started. A few months may pass before you achieve the results you want. Once your shoulder heals, it rarely becomes frozen again. So stick to your stretching program. If you have any questions, be sure to ask your doctor.   Date Last Reviewed: 3/1/2018    8563-4439 [employer]. [address] [place] [address]. All rights reserved. This information is not intended as a substitute for professional medical care. Always follow your healthcare professional's instructions.           Patient Education     Exercises for Shoulder  "Flexibility: Wall Walk    Improving your flexibility can reduce pain. Stretching exercises also can help increase your range of pain-free motion. Breathe normally when you exercise. Use smooth, fluid movements.  Note: Follow any special instructions you are given. If you feel pain, stop the exercise. If the pain continues after stopping, call your healthcare provider:    Stand with your shoulder about 2 feet from the wall.    Raise your arm to shoulder level and gently  walk  your fingers up the wall as high as you can.    Hold for a few seconds. Then walk your fingers back down.    Repeat 3 times. Move closer to the wall as you repeat.    Build up to holding each stretch for 30 seconds.  Caution: Do this stretch only if your healthcare provider recommends it. Don t do it when you are first injured.       Date Last Reviewed: 3/1/2018    6234-3035 [employer]. [address] [place] [address]. All rights reserved. This information is not intended as a substitute for professional medical care. Always follow your healthcare professional's instructions.           Patient Education     \"Reaching Up\" for Shoulder Flexibility    This stretch can help restore shoulder flexibility and relieve pain over time. When stretching, be sure to breathe deeply. And follow any special instructions from your healthcare provider or therapist.  4. Raise the hand on the side you want to stretch as high as you can. Then grasp a stable surface, such as a bookcase or a doorframe, with the same hand.  5. Keeping your arm straight, lower your body by bending your knees. Stop when you feel the stretch in the shoulder. Try to hold the stretch for 5 seconds.  6. Work up to doing 3 sets of this stretch, 3 times a day. Work up to holding the stretch for 30 to 60 seconds.  Note: Your back should remain straight. To enhance the stretch over time, try to bend your knees lower. Or, raise your arm higher at the start of the " stretch.     Frozen shoulder  Frozen shoulder is another name for adhesive capsulitis. This causes restricted movement in the shoulder. If you have frozen shoulder, this stretch may cause mild pain, especially when you first get started. A few months may pass before you achieve the results you want. But once your shoulder heals, it rarely becomes frozen again. So stick to your stretching program. If you have any questions, ask your healthcare provider.   Date Last Reviewed: 2/1/2018 2000-2018 [employer]. [address] [place] [address]. All rights reserved. This information is not intended as a substitute for professional medical care. Always follow your healthcare professional's instructions.           Patient Education     Shoulder External Rotation, Side-Lying (Strength)    This exercise is for your right shoulder joint. Switch sides if the problem is in your left shoulder joint.  1. Lie on your left side with your head supported by a pillow. Place a small rolled-up towel under your right elbow.  2. Hold a hand weight in your right hand. Your healthcare provider will tell you what size hand weight to use.  3. Bend your arm in front of you at a right angle. Then bend it down and bring the hand weight to the floor. Rest your forearm on your stomach.  4. Keep your elbow on the towel and slowly lift the hand weight up in front of you. Stop when the weight is raised slightly higher than your elbow.  5. Lower the weight back down.  6. Repeat 5 to 15 times, or as instructed.  Date Last Reviewed: 3/10/2016    6738-2296 [employer]. [address] [place] [address]. All rights reserved. This information is not intended as a substitute for professional medical care. Always follow your healthcare professional's instructions.           Patient Education     Shoulder Abduction (Strength)    1. Stand up straight with your feet slightly apart, and hold a hand weight in each  hand. Your healthcare provider will tell you what size hand weights to use. Hold your arms down at your sides.  2. Slowly raise your arms up and out to the side until the weights are at shoulder level. Then slowly lower your arms back down. Repeat 5 times.  3. Do this exercise 3 times a day, or as instructed.  Safety tip: Don t swing the weights quickly, or raise them above shoulder level.   Date Last Reviewed: 5/1/2016 2000-2018 [employer]. [address] [place] [address]. All rights reserved. This information is not intended as a substitute for professional medical care. Always follow your healthcare professional's instructions.           Patient Education     Shoulder Flexion (Strength)    1. Stand up straight with your feet slightly apart, and hold a hand weight in each hand. Your healthcare provider will tell you what size hand weights to use. Hold your arms down at your sides with your palms facing back, and the weights resting on the fronts of your thighs.  2. Look straight ahead. Slowly raise one arm straight up in front of you until the hand weight is in your line of sight. Keep your elbow straight but not locked. Hold for 1 second, then lower your arm. Repeat 5 times.  3. Repeat this exercise with the other arm.   4. Do this exercise 3 times a day, or as instructed.     Tip: Don t swing the weights. Use slow, controlled movements.   Date Last Reviewed: 5/1/2016 2000-2018 [employer]. [address] [place] [address]. All rights reserved. This information is not intended as a substitute for professional medical care. Always follow your healthcare professional's instructions.           Patient Education     Shoulder Internal Rotation (Strength)    This exercise is for your right shoulder. Switch sides for your left shoulder.  1. Put a hand weight on the floor. Your healthcare provider will tell you what size hand weight to use.  2. Lie down on the floor  on your back, with your knees bent. Use the hand on the side of your injured shoulder to grasp the hand weight.  3. With your upper arm against your body, rest your elbow on the floor and hold the weight in the air with your forearm.  4. Lower the hand weight outward, at a right angle from your body. Don t lower the hand weight all the way to the floor.  5. Slowly raise the hand weight back up.  6. Repeat this exercise 5 to 15 times, or as instructed.     Safety tip: Support your head and neck with a pillow.   Date Last Reviewed: 5/1/2016 2000-2018 [employer]. [address] [place] [address], [place] [address]. All rights reserved. This information is not intended as a substitute for professional medical care. Always follow your healthcare professional's instructions.

## 2019-03-14 DIAGNOSIS — E11.9 TYPE 2 DIABETES MELLITUS WITHOUT COMPLICATION (H): ICD-10-CM

## 2019-03-14 NOTE — TELEPHONE ENCOUNTER
"Requested Prescriptions   Pending Prescriptions Disp Refills     ONETOUCH ULTRA test strip [Pharmacy Med Name: ONE TOUCH ULTRA BLUE TEST STRP] 50 strip 0     Sig: TEST 1-2 TIMES DAILY AS DIRECTED    Diabetic Supplies Protocol Passed - 3/14/2019  3:17 AM       Passed - Medication is active on med list       Passed - Patient is 18 years of age or older       Passed - Recent (6 mo) or future (30 days) visit within the authorizing provider's specialty    Patient had office visit in the last 6 months or has a visit in the next 30 days with authorizing provider.  See \"Patient Info\" tab in inbasket, or \"Choose Columns\" in Meds & Orders section of the refill encounter.            Prescription approved per [facility] Refill Protocol.  Arelis Hopkins RN  [place] [facility]    "

## 2019-04-09 ENCOUNTER — OFFICE VISIT (OUTPATIENT)
Dept: FAMILY MEDICINE | Facility: CLINIC | Age: 69
End: 2019-04-09
Payer: COMMERCIAL

## 2019-04-09 VITALS
WEIGHT: 229 LBS | RESPIRATION RATE: 18 BRPM | SYSTOLIC BLOOD PRESSURE: 118 MMHG | TEMPERATURE: 98.6 F | OXYGEN SATURATION: 94 % | HEIGHT: 68 IN | DIASTOLIC BLOOD PRESSURE: 70 MMHG | BODY MASS INDEX: 34.71 KG/M2 | HEART RATE: 109 BPM

## 2019-04-09 DIAGNOSIS — S46.002A ROTATOR CUFF INJURY, LEFT, INITIAL ENCOUNTER: Primary | ICD-10-CM

## 2019-04-09 DIAGNOSIS — M54.9 UPPER BACK PAIN: ICD-10-CM

## 2019-04-09 PROCEDURE — 20610 DRAIN/INJ JOINT/BURSA W/O US: CPT | Mod: LT | Performed by: FAMILY MEDICINE

## 2019-04-09 PROCEDURE — 99213 OFFICE O/P EST LOW 20 MIN: CPT | Mod: 25 | Performed by: FAMILY MEDICINE

## 2019-04-09 RX ORDER — HYDROCODONE BITARTRATE AND ACETAMINOPHEN 5; 325 MG/1; MG/1
1 TABLET ORAL EVERY 6 HOURS PRN
Qty: 40 TABLET | Refills: 0 | Status: SHIPPED | OUTPATIENT
Start: 2019-04-09 | End: 2019-06-11

## 2019-04-09 RX ORDER — CYCLOBENZAPRINE HCL 10 MG
TABLET ORAL
Qty: 30 TABLET | Refills: 0 | Status: SHIPPED | OUTPATIENT
Start: 2019-04-09 | End: 2019-04-29

## 2019-04-09 ASSESSMENT — MIFFLIN-ST. JEOR: SCORE: 1788.24

## 2019-04-09 NOTE — PROGRESS NOTES
"  SUBJECTIVE:                                                      Francisco Thornton is a 69 year old male who presents to clinic today for the following health issues:    Left Shoulder Pain   Last Office Visit, 03/112019 (30 days ago)  Demond reports that his left shoulder has not been better -- the pain has started to radiated to his neck, upper middle back, and his other shoulder. He's been using ice on his back which seems to be more effective than placing it at the shoulder. Hot or very cold showers are also helpful. He also notes that he has been taking CBD which is helping with the joints aches and pains but not the shoulder pain. He inquiries about pain medication.       Problem list and histories reviewed & adjusted, as indicated.  Additional history: as documented    ROS:  Constitutional, HEENT, cardiovascular, pulmonary, GI, , musculoskeletal, neuro, skin, endocrine and psych systems are negative, except as otherwise noted.  This document serves as a record of the services and decisions personally performed and made by Geovany Perkins MD. It was created on his behalf by Fede Rubi, a trained medical scribe. The creation of this document is based the provider's statements to the medical scribe.  Scribe Fede Rubi 5:14 PM, April 9, 2019    OBJECTIVE:                                                    /70   Pulse 109   Temp 98.6  F (37  C) (Oral)   Resp 18   Ht 1.727 m (5' 8\")   Wt 103.9 kg (229 lb)   SpO2 94%   BMI 34.82 kg/m   Body mass index is 34.82 kg/m .   GENERAL: healthy, alert, well nourished, well hydrated, no distress  HENT: ear canals- normal; TMs- normal; Nose- normal; Mouth- no ulcers, no lesions  NECK: no tenderness, no adenopathy, no asymmetry, no masses, no stiffness; thyroid- normal to palpation  RESP: lungs clear to auscultation - no rales, no rhonchi, no wheezes  CV: regular rates and rhythm, normal S1 S2, no S3 or S4 and no murmur, no click or rub -  ABDOMEN: soft, no tenderness, no  " "hepatosplenomegaly, no masses, normal bowel sounds  MS: Left shoulder Insertion of the supraspinatus, positive impingement sign, active abduction to 90 degrees; extremities- no gross deformities noted, no edema  SKIN: no suspicious lesions, no rashes  BACK: Left trapezius trigger point tenderness, otherwise no CVA tenderness, no paralumbar tenderness    Procedure Note  After consent was obtained, using sterile technique the left shoulder was prepped and 1.5cc's of 1% plain Lidocaine and 20 mg Kenalog was then injected and the needle withdrawn.  The procedure was well tolerated.  The patient is asked to continue to rest the treated area for 5-7 more days before resuming regular activities.  It may be more painful for the first 1-2 days.  Watch for fever, or increased swelling or persistent pain in knee. Call or return to clinic prn if such symptoms occur or the pain fails to improve as anticipated.    ASSESSMENT/PLAN:                                                    Francisco was seen today for recheck.    Diagnoses and all orders for this visit:    Rotator cuff injury, left, initial encounter - Persistent condition, worsening, see procedure note, begin:   -     HYDROcodone-acetaminophen (NORCO) 5-325 MG tablet; Take 1 tablet by mouth every 6 hours as needed for pain    Upper back pain - New diagnosis, Symptomatic cares discussed. Begin:   -     cyclobenzaprine (FLEXERIL) 10 MG tablet; 1/2 tab during the day and 1 tab at bedtime as needed for muscle spasms    Risks, benefits and alternatives of treatments discussed. Plan agreed on.      Followup: Return in about 1 month (around 5/9/2019), or if symptoms worsen or fail to improve, for recheck.    See patient instructions.     BMI:   Estimated body mass index is 34.82 kg/m  as calculated from the following:    Height as of this encounter: 1.727 m (5' 8\").    Weight as of this encounter: 103.9 kg (229 lb).   Weight management plan: Discussed healthy diet and exercise " guidelines    The information in this document, created by the medical scribe for me, accurately reflects the services I personally performed and the decisions made by me. I have reviewed and approved this document for accuracy prior to leaving the patient care area.  5:25 PM, 04/09/19        Dinh Perkins MD   Pager: [phone]

## 2019-04-29 DIAGNOSIS — M54.9 UPPER BACK PAIN: ICD-10-CM

## 2019-04-30 RX ORDER — CYCLOBENZAPRINE HCL 10 MG
TABLET ORAL
Qty: 30 TABLET | Refills: 0 | Status: SHIPPED | OUTPATIENT
Start: 2019-04-30 | End: 2019-06-02

## 2019-04-30 NOTE — TELEPHONE ENCOUNTER
cyclobenzaprine (FLEXERIL) 10 MG tablet          Last Written Prescription Date:  4.9.19  Last Fill Quantity: 30 tablet,   # refills: 0  Last Office Visit: 4.9.19  Future Office visit:       Routing refill request to provider for review/approval because:  Drug not on the [facility], [facility] or [facility] refill protocol or controlled substance

## 2019-04-30 NOTE — TELEPHONE ENCOUNTER
Routing refill request to provider for review/approval because:  Drug not on the [facility] refill protocol       Aundrea Schneider RN, BSN  [place] [facility]

## 2019-05-08 DIAGNOSIS — E11.9 TYPE 2 DIABETES MELLITUS WITHOUT COMPLICATION (H): ICD-10-CM

## 2019-05-08 NOTE — TELEPHONE ENCOUNTER
"Requested Prescriptions   Pending Prescriptions Disp Refills     ONETOUCH ULTRA test strip [Pharmacy Med Name: ONE TOUCH ULTRA BLUE TEST STRP]      Last Written Prescription Date:  3.14.19  Last Fill Quantity: 50 strip,  # refills: 1   Last office visit: 4/9/2019 with prescribing provider:  Geovany Perkins MD       Future Office Visit:       50 strip 1     Sig: TEST 1-2 TIMES DAILY AS DIRECTED       Diabetic Supplies Protocol Passed - 5/8/2019  1:25 AM        Passed - Medication is active on med list        Passed - Patient is 18 years of age or older        Passed - Recent (6 mo) or future (30 days) visit within the authorizing provider's specialty     Patient had office visit in the last 6 months or has a visit in the next 30 days with authorizing provider.  See \"Patient Info\" tab in inbasket, or \"Choose Columns\" in Meds & Orders section of the refill encounter.            "

## 2019-05-08 NOTE — TELEPHONE ENCOUNTER
Prescription approved per [facility] Refill Protocol.  Arelis Hopkins RN  [facility]

## 2019-05-19 DIAGNOSIS — E11.9 TYPE 2 DIABETES MELLITUS WITHOUT COMPLICATION, WITHOUT LONG-TERM CURRENT USE OF INSULIN (H): ICD-10-CM

## 2019-05-20 RX ORDER — GLIMEPIRIDE 4 MG/1
8 TABLET ORAL
Qty: 60 TABLET | Refills: 0 | Status: SHIPPED | OUTPATIENT
Start: 2019-05-20 | End: 2019-06-11

## 2019-05-20 NOTE — TELEPHONE ENCOUNTER
"Requested Prescriptions   Pending Prescriptions Disp Refills     glimepiride (AMARYL) 4 MG tablet [Pharmacy Med Name: GLIMEPIRIDE 4 MG TABLET]      Last Written Prescription Date:  11.13.18  Last Fill Quantity: 180 tablet,  # refills: 1   Last office visit: 4/9/2019 with prescribing provider:  Geovany Perkins MD         Future Office Visit:       180 tablet 1     Sig: TAKE 2 TABLETS (8 MG) BY MOUTH EVERY MORNING (BEFORE BREAKFAST)       Sulfonylurea Agents Passed - 5/19/2019  7:40 AM        Passed - Blood pressure less than 140/90 in past 6 months     BP Readings from Last 3 Encounters:   04/09/19 118/70   03/11/19 118/70   12/01/18 126/80                 Passed - Patient has documented LDL within the past 12 mos.     Recent Labs   Lab Test 12/01/18  0825   LDL 95             Passed - Patient has had a Microalbumin in the past 15 mos.     Recent Labs   Lab Test 11/13/18  1441   MICROL 5   UMALCR 13.64             Passed - Patient has documented A1c within the specified period of time.     If HgbA1C is 8 or greater, it needs to be on file within the past 3 months.  If less than 8, must be on file within the past 6 months.     Recent Labs   Lab Test 12/01/18  0825   A1C 7.1*             Passed - Medication is active on med list        Passed - Patient is age 18 or older        Passed - Patient has a recent creatinine (normal) within the past 12 mos.     Recent Labs   Lab Test 12/01/18  0825   CR 0.90             Passed - Recent (6 mo) or future (30 days) visit within the authorizing provider's specialty     Patient had office visit in the last 6 months or has a visit in the next 30 days with authorizing provider or within the authorizing provider's specialty.  See \"Patient Info\" tab in inbasket, or \"Choose Columns\" in Meds & Orders section of the refill encounter.            "

## 2019-05-20 NOTE — TELEPHONE ENCOUNTER
Due for an Office visit for further refills, only fill for 30 days     Aundrea Schneider RN, BSN  [facility]

## 2019-05-31 DIAGNOSIS — E11.9 CONTROLLED TYPE 2 DIABETES MELLITUS WITHOUT COMPLICATION, WITHOUT LONG-TERM CURRENT USE OF INSULIN (H): ICD-10-CM

## 2019-05-31 NOTE — TELEPHONE ENCOUNTER
"Requested Prescriptions   Pending Prescriptions Disp Refills     blood glucose monitoring (NO BRAND SPECIFIED) meter device kit        Last Written Prescription Date:  7.25.17  Last Fill Quantity: 1 kit,  # refills: 0   Last office visit: 4/9/2019 with prescribing provider:  Geovany Perkins MD         Future Office Visit:   Next 5 appointments (look out 90 days)    Jun 11, 2019  4:00 PM CDT  [facility] with [facility] MD  [facility] [address] [place] [id number] [phone]            1 kit 0     Sig: Use to test blood sugar 1-2 times daily or as directed.  With test strips and all needed suplies       Diabetic Supplies Protocol Passed - 5/31/2019 10:33 AM        Passed - Medication is active on med list        Passed - Patient is 18 years of age or older        Passed - Recent (6 mo) or future (30 days) visit within the authorizing provider's specialty     Patient had office visit in the last 6 months or has a visit in the next 30 days with authorizing provider.  See \"Patient Info\" tab in inbasket, or \"Choose Columns\" in Meds & Orders section of the refill encounter.            "

## 2019-05-31 NOTE — TELEPHONE ENCOUNTER
Reason for Call:  Medication or medication refill:    Do you use a [facility]?  Name of the pharmacy and phone number for the current request:  [facility] - [phone]    Name of the medication requested: blood glucose monitoring (NO BRAND SPECIFIED) meter device kit    Other request: States device broke and patient needs a new one. Advised of 72 business hour policy.    Can we leave a detailed message on this number? YES    Phone number patient can be reached at: Other phone number:  [phone] (spouse Francisca, CTC filed)    Best Time: anytime    Call taken on 5/31/2019 at 10:31 AM by Grayson ZARATE

## 2019-06-02 DIAGNOSIS — M54.9 UPPER BACK PAIN: ICD-10-CM

## 2019-06-03 DIAGNOSIS — E11.9 TYPE 2 DIABETES MELLITUS WITHOUT COMPLICATION (H): ICD-10-CM

## 2019-06-03 RX ORDER — CYCLOBENZAPRINE HCL 10 MG
TABLET ORAL
Qty: 30 TABLET | Refills: 0 | Status: SHIPPED | OUTPATIENT
Start: 2019-06-03 | End: 2019-06-26

## 2019-06-03 NOTE — TELEPHONE ENCOUNTER
"Requested Prescriptions   Pending Prescriptions Disp Refills     blood glucose (ONETOUCH ULTRA) test strip        Last Written Prescription Date:  5.8.19  Last Fill Quantity: 50 strip,  # refills: 1   Last office visit: 4/9/2019 with prescribing provider:  Geovnay Perkins MD         Future Office Visit:   Next 5 appointments (look out 90 days)    Jun 11, 2019  4:00 PM CDT  Office Visit with Geovany Perkins MD  [facility] ([facility]) [address] [place] [id number] [phone]            50 strip 1     Sig: Use to test blood sugar  times daily or as directed.       Diabetic Supplies Protocol Passed - 6/3/2019  2:57 PM        Passed - Medication is active on med list        Passed - Patient is 18 years of age or older        Passed - Recent (6 mo) or future (30 days) visit within the authorizing provider's specialty     Patient had office visit in the last 6 months or has a visit in the next 30 days with authorizing provider.  See \"Patient Info\" tab in inbasket, or \"Choose Columns\" in Meds & Orders section of the refill encounter.            "

## 2019-06-03 NOTE — TELEPHONE ENCOUNTER
Last Written Prescription Date:  04/30/19  Last Fill Quantity: 30,  # refills: 0   Last office visit: 4/9/2019 with prescribing provider:     Future Office Visit:   Next 5 appointments (look out 90 days)    Jun 11, 2019  4:00 PM CDT  Office Visit with Geovany Perkins MD  [facility]) [address] [place] [id number] [phone]         Requested Prescriptions   Pending Prescriptions Disp Refills     cyclobenzaprine (FLEXERIL) 10 MG tablet [Pharmacy Med Name: CYCLOBENZAPRINE 10 MG TABLET] 30 tablet 0     Sig: TAKE 1/2 TABLET BY MOUTH DURING THE DAY AND 1 TABLET AT BEDTIME AS NEEDED FOR MUSCLE SPASMS       There is no refill protocol information for this order

## 2019-06-04 DIAGNOSIS — E78.5 HYPERLIPIDEMIA LDL GOAL <70: ICD-10-CM

## 2019-06-04 DIAGNOSIS — E11.9 TYPE 2 DIABETES MELLITUS WITHOUT COMPLICATION, WITHOUT LONG-TERM CURRENT USE OF INSULIN (H): ICD-10-CM

## 2019-06-04 RX ORDER — SIMVASTATIN 40 MG
40 TABLET ORAL AT BEDTIME
Qty: 90 TABLET | Refills: 2 | Status: SHIPPED | OUTPATIENT
Start: 2019-06-04 | End: 2020-02-28

## 2019-06-04 RX ORDER — GLIMEPIRIDE 4 MG/1
8 TABLET ORAL
Qty: 60 TABLET | Refills: 0 | Status: SHIPPED | OUTPATIENT
Start: 2019-06-04 | End: 2019-07-12

## 2019-06-04 NOTE — TELEPHONE ENCOUNTER
"Requested Prescriptions   Pending Prescriptions Disp Refills     simvastatin (ZOCOR) 40 MG tablet [Pharmacy Med Name: SIMVASTATIN 40 MG TABLET]      Last Written Prescription Date:  11.13.18  Last Fill Quantity: 90 tablet,  # refills: 1   Last office visit: 4/9/2019 with prescribing provider:  Geovany Perkins MD           Future Office Visit:   Next 5 appointments (look out 90 days)    Jun 11, 2019  4:00 PM CDT  Office Visit with Geovany Perkins MD  [facility] ([address] [id number] [phone]            90 tablet 1     Sig: TAKE 1 TABLET (40 MG) BY MOUTH AT BEDTIME       Statins Protocol Passed - 6/4/2019  1:28 AM        Passed - LDL on file in past 12 months     Recent Labs   Lab Test 12/01/18  0825   LDL 95             Passed - No abnormal creatine kinase in past 12 months     No lab results found.             Passed - Recent (12 mo) or future (30 days) visit within the authorizing provider's specialty     Patient had office visit in the last 12 months or has a visit in the next 30 days with authorizing provider or within the authorizing provider's specialty.  See \"Patient Info\" tab in inbasket, or \"Choose Columns\" in Meds & Orders section of the refill encounter.              Passed - Medication is active on med list        Passed - Patient is age 18 or older        metFORMIN (GLUCOPHAGE) 1000 MG tablet [Pharmacy Med Name: METFORMIN HCL 1,000 MG TABLET] 180 tablet 1     Sig: TAKE 1 TABLET (1,000 MG) BY MOUTH 2 TIMES DAILY (WITH MEALS)       Biguanide Agents Failed - 6/4/2019  1:28 AM        Failed - Patient has documented A1c within the specified period of time.     If HgbA1C is 8 or greater, it needs to be on file within the past 3 months.  If less than 8, must be on file within the past 6 months.     Recent Labs   Lab Test 12/01/18  0825   A1C 7.1*             Passed - Blood pressure less than 140/90 in past 6 months     BP " "Readings from Last 3 Encounters:   04/09/19 118/70   03/11/19 118/70   12/01/18 126/80                 Passed - Patient has documented LDL within the past 12 mos.     Recent Labs   Lab Test 12/01/18  0825   LDL 95             Passed - Patient has had a Microalbumin in the past 15 mos.     Recent Labs   Lab Test 11/13/18  1441   MICROL 5   UMALCR 13.64             Passed - Patient is age 10 or older        Passed - Patient's CR is NOT>1.4 OR Patient's EGFR is NOT<45 within past 12 mos.     Recent Labs   Lab Test 12/01/18  0825   GFRESTIMATED 84   GFRESTBLACK >90       Recent Labs   Lab Test 12/01/18  0825   CR 0.90             Passed - Patient does NOT have a diagnosis of CHF.        Passed - Medication is active on med list        Passed - Recent (6 mo) or future (30 days) visit within the authorizing provider's specialty     Patient had office visit in the last 6 months or has a visit in the next 30 days with authorizing provider or within the authorizing provider's specialty.  See \"Patient Info\" tab in inbasket, or \"Choose Columns\" in Meds & Orders section of the refill encounter.            glimepiride (AMARYL) 4 MG tablet [Pharmacy Med Name: GLIMEPIRIDE 4 MG TABLET] 180 tablet 1     Sig: TAKE 2 TABLETS (8 MG) BY MOUTH EVERY MORNING (BEFORE BREAKFAST)       Sulfonylurea Agents Failed - 6/4/2019  1:28 AM        Failed - Patient has documented A1c within the specified period of time.     If HgbA1C is 8 or greater, it needs to be on file within the past 3 months.  If less than 8, must be on file within the past 6 months.     Recent Labs   Lab Test 12/01/18  0825   A1C 7.1*             Passed - Blood pressure less than 140/90 in past 6 months     BP Readings from Last 3 Encounters:   04/09/19 118/70   03/11/19 118/70   12/01/18 126/80                 Passed - Patient has documented LDL within the past 12 mos.     Recent Labs   Lab Test 12/01/18  0825   LDL 95             Passed - Patient has had a Microalbumin in the " "past 15 mos.     Recent Labs   Lab Test 11/13/18  1441   MICROL 5   UMALCR 13.64             Passed - Medication is active on med list        Passed - Patient is age 18 or older        Passed - Patient has a recent creatinine (normal) within the past 12 mos.     Recent Labs   Lab Test 12/01/18  0825   CR 0.90             Passed - Recent (6 mo) or future (30 days) visit within the authorizing provider's specialty     Patient had office visit in the last 6 months or has a visit in the next 30 days with authorizing provider or within the authorizing provider's specialty.  See \"Patient Info\" tab in inbasket, or \"Choose Columns\" in Meds & Orders section of the refill encounter.            "

## 2019-06-04 NOTE — TELEPHONE ENCOUNTER
A 30 day supply is given, patient is due for an office visit.  Patient has appointment scheduled for 6/11/19.  ANTHAN Landeros, RN  [facility]

## 2019-06-11 ENCOUNTER — OFFICE VISIT (OUTPATIENT)
Dept: FAMILY MEDICINE | Facility: CLINIC | Age: 69
End: 2019-06-11
Payer: COMMERCIAL

## 2019-06-11 VITALS
WEIGHT: 229 LBS | SYSTOLIC BLOOD PRESSURE: 120 MMHG | DIASTOLIC BLOOD PRESSURE: 80 MMHG | BODY MASS INDEX: 34.71 KG/M2 | HEIGHT: 68 IN | TEMPERATURE: 98.5 F | HEART RATE: 94 BPM | OXYGEN SATURATION: 94 %

## 2019-06-11 DIAGNOSIS — S46.002D INJURY OF LEFT ROTATOR CUFF, SUBSEQUENT ENCOUNTER: ICD-10-CM

## 2019-06-11 DIAGNOSIS — S46.002A ROTATOR CUFF INJURY, LEFT, INITIAL ENCOUNTER: ICD-10-CM

## 2019-06-11 DIAGNOSIS — E11.9 CONTROLLED TYPE 2 DIABETES MELLITUS WITHOUT COMPLICATION, WITHOUT LONG-TERM CURRENT USE OF INSULIN (H): Primary | ICD-10-CM

## 2019-06-11 DIAGNOSIS — Z12.11 SCREEN FOR COLON CANCER: ICD-10-CM

## 2019-06-11 LAB — HBA1C MFR BLD: 7.9 % (ref 0–5.6)

## 2019-06-11 PROCEDURE — 36415 COLL VENOUS BLD VENIPUNCTURE: CPT | Performed by: FAMILY MEDICINE

## 2019-06-11 PROCEDURE — 83036 HEMOGLOBIN GLYCOSYLATED A1C: CPT | Performed by: FAMILY MEDICINE

## 2019-06-11 PROCEDURE — 99214 OFFICE O/P EST MOD 30 MIN: CPT | Performed by: FAMILY MEDICINE

## 2019-06-11 ASSESSMENT — MIFFLIN-ST. JEOR: SCORE: 1788.24

## 2019-06-11 NOTE — PATIENT INSTRUCTIONS
Lab Results   Component Value Date    A1C 7.9 06/11/2019    A1C 7.1 12/01/2018    A1C 7.5 02/12/2018    A1C 7.4 10/25/2017    A1C 8.3 07/19/2017    A1C 6.4 06/30/2016      Hemoglobin A1c   Average Blood Sugar    6%    135 mg/dL  7%    170 mg/dL  8%    205 mg/dL   9%    240 mg/dL   10%    275 mg/dL

## 2019-06-11 NOTE — PROGRESS NOTES
Subjective     Francisco Thornton is a 69 year old male who presents to clinic today for the following health issues:    HPI   Diabetes Follow-up    How often are you checking your blood sugar? Twice a day AM - 200-     What time of day are you checking your blood sugars (select all that apply)?  Am and pm    Have you had any blood sugars above 200?  Yes     Have you had any blood sugars below 70?  No    What symptoms do you notice when your blood sugar is low?  None    What concerns do you have today about your diabetes? None     Do you have any of these symptoms? (Select all that apply)  Numbness in feet and Burning in feet     Have you had a diabetic eye exam in the last 12 months? Needs appt    Medication: glimepiride, metformin    Demond reports that he can better improve his diet and she's not managing his food intake choices well.     Hemoglobin A1C (%)   Date Value   12/01/2018 7.1 (H)   02/12/2018 7.5 (H)     Diabetes Management Resources    Hyperlipidemia Follow-Up    Are you having any of the following symptoms? (Select all that apply)  No complaints of shortness of breath, chest pain or pressure.  No increased sweating or nausea with activity.  No left-sided neck or arm pain.  No complaints of pain in calves when walking 1-2 blocks.    Are you regularly taking any medication or supplement to lower your cholesterol?   Yes- simvastatin    Are you having muscle aches or other side effects that you think could be caused by your cholesterol lowering medication?  No    LDL Cholesterol Calculated   Date Value Ref Range Status   12/01/2018 95 <100 mg/dL Final     Comment:     Desirable:       <100 mg/dl   07/19/2017 90 <100 mg/dL Final     Comment:     Desirable:       <100 mg/dl     Hypertension Follow-up    Do you check your blood pressure regularly outside of the clinic? No     Are you following a low salt diet? No    Are your blood pressures ever more than 140 on the top number (systolic) OR more than 90  "on the bottom number (diastolic), for example 140/90? Does not check. Denies headaches or pressure.    BP Readings from Last 3 Encounters:   06/11/19 120/80   04/09/19 118/70   03/11/19 118/70         Reviewed and updated as needed this visit by provider:  Tobacco  Allergies  Meds  Problems  Med Hx  Surg Hx  Fam Hx         Review of Systems   Constitutional, HEENT, cardiovascular, pulmonary, GI, , musculoskeletal, neuro, skin, endocrine and psych systems are negative, except as otherwise noted.    MS: persistent right shoulder pain.  This document serves as a record of the services and decisions personally performed and made by Geovany Perkins MD. It was created on his behalf by Fede Rubi, a trained medical scribe. The creation of this document is based the provider's statements to the medical scribe.  Scribe Fede Rubi 4:01 PM, June 11, 2019    Objective   /80   Pulse 94   Temp 98.5  F (36.9  C) (Oral)   Ht 1.727 m (5' 8\")   Wt 103.9 kg (229 lb)   SpO2 94%   BMI 34.82 kg/m   Body mass index is 34.82 kg/m .  Physical Exam   GENERAL: healthy, alert, well nourished, well hydrated, no distress  HENT: ear canals- normal; TMs- normal; Nose- normal; Mouth- no ulcers, no lesions  NECK: no tenderness, no adenopathy, no asymmetry, no masses, mild decreased ROM upward.  thyroid- normal to palpation  RESP: lungs clear to auscultation - no rales, no rhonchi, no wheezes  CV: regular rates and rhythm, normal S1 S2, no S3 or S4 and 1/6 systolic ejection murmur across the left sternal border. no click or rub -  ABDOMEN: soft, no tenderness, no  hepatosplenomegaly, no masses, normal bowel sounds  MS: extremities- no gross deformities noted, no edema  SKIN: no suspicious lesions, no rashes  Diabetic foot exam: normal DP and PT pulses, no trophic changes or ulcerative lesions and normal sensory exam  NEURO: strength and tone- normal, except for mild weakness in right arm, sensory exam- grossly normal, mentation- intact, " "speech- normal, reflexes- symmetric - positive Empty Can test in right      Assessment & Plan   Francisco was seen today for recheck medication.    Diagnoses and all orders for this visit:    Controlled type 2 diabetes mellitus without complication, without long-term current use of insulin (H) - Fair, recommended to better control diet, and exercise. Continue medications.  -     HEMOGLOBIN A1C  -     Hemoglobin A1c; Future  -     OPHTHALMOLOGY ADULT REFERRAL    Injury of left rotator cuff, subsequent encounter - Persistent symptoms, ortho referral and MRI ordered -- given for further evaluation. Symptomatic cares discussed.    -     MR Shoulder Left w/o Contrast; Future  -     ORTHO  REFERRAL    Screen for colon cancer      BMI:   Estimated body mass index is 34.82 kg/m  as calculated from the following:    Height as of 4/9/19: 1.727 m (5' 8\").    Weight as of 4/9/19: 103.9 kg (229 lb).   Weight management plan: Discussed healthy diet and exercise guidelines    See Patient Instructions    Return in about 6 months (around 12/11/2019) for Wellness Exam and fasting labs.    The information in this document, created by the medical scribe for me, accurately reflects the services I personally performed and the decisions made by me. I have reviewed and approved this document for accuracy prior to leaving the patient care area.  4:18 PM, 06/11/19 - Total Visit Time: 17 minutes.        Dinh Perkins MD   Pager - [phone]  [facility]    "

## 2019-06-13 ENCOUNTER — HOSPITAL ENCOUNTER (OUTPATIENT)
Dept: MRI IMAGING | Facility: CLINIC | Age: 69
Discharge: HOME | End: 2019-06-13
Attending: FAMILY MEDICINE | Admitting: FAMILY MEDICINE
Payer: COMMERCIAL

## 2019-06-13 DIAGNOSIS — S46.002D INJURY OF LEFT ROTATOR CUFF, SUBSEQUENT ENCOUNTER: ICD-10-CM

## 2019-06-13 PROCEDURE — 73221 MRI JOINT UPR EXTREM W/O DYE: CPT | Mod: LT

## 2019-06-17 NOTE — RESULT ENCOUNTER NOTE
Dear Demond,    Here is a summary of your recent test results:  Shoulder MRI shows some torn tendons and this should be reviewed with an orthopedic surgeon to figure out how to help you - I signed a referral and a  will call you.     For additional lab test information, [email or web address] is an excellent reference.    In addition, here is a list of due or overdue Health Maintenance reminders:  Colonscopy due  Eye Exam due on 04/24/2019    Please call us at [phone] (or use [employer]) to address the above recommendations if needed.           Thank you very much for trusting me and [facility].     Healthy regards,  Dinh Perkins MD

## 2019-06-26 DIAGNOSIS — M54.9 UPPER BACK PAIN: ICD-10-CM

## 2019-06-27 NOTE — TELEPHONE ENCOUNTER
cyclobenzaprine (FLEXERIL) 10 MG tablet              Last Written Prescription Date:  6.3.19  Last Fill Quantity: 30 tablet,   # refills: 0  Last Office Visit: 6.11.19  Future Office visit:       Routing refill request to provider for review/approval because:  Drug not on the [facility], [facility] or [facility] refill protocol or controlled substance

## 2019-06-28 DIAGNOSIS — E11.9 TYPE 2 DIABETES MELLITUS WITHOUT COMPLICATION, WITHOUT LONG-TERM CURRENT USE OF INSULIN (H): ICD-10-CM

## 2019-06-28 RX ORDER — CYCLOBENZAPRINE HCL 10 MG
TABLET ORAL
Qty: 30 TABLET | Refills: 0 | Status: SHIPPED | OUTPATIENT
Start: 2019-06-28 | End: 2019-07-15

## 2019-06-28 NOTE — TELEPHONE ENCOUNTER
"Requested Prescriptions   Pending Prescriptions Disp Refills     metFORMIN (GLUCOPHAGE) 1000 MG tablet [Pharmacy Med Name: METFORMIN HCL 1,000 MG TABLET]        Last Written Prescription Date:  6.4..19  Last Fill Quantity: 60 tablet,  # refills: 0   Last office visit: 6/11/2019 with prescribing provider:  Geovany Perkins MD             Future Office Visit:       60 tablet 0     Sig: TAKE 1 TABLET (1,000 MG) BY MOUTH 2 TIMES DAILY (WITH MEALS). NEEDS APPT.       Biguanide Agents Passed - 6/28/2019  9:31 AM        Passed - Blood pressure less than 140/90 in past 6 months     BP Readings from Last 3 Encounters:   06/11/19 120/80   04/09/19 118/70   03/11/19 118/70                 Passed - Patient has documented LDL within the past 12 mos.     Recent Labs   Lab Test 12/01/18  0825   LDL 95             Passed - Patient has had a Microalbumin in the past 15 mos.     Recent Labs   Lab Test 11/13/18  1441   MICROL 5   UMALCR 13.64             Passed - Patient is age 10 or older        Passed - Patient has documented A1c within the specified period of time.     If HgbA1C is 8 or greater, it needs to be on file within the past 3 months.  If less than 8, must be on file within the past 6 months.     Recent Labs   Lab Test 06/11/19  1553   A1C 7.9*             Passed - Patient's CR is NOT>1.4 OR Patient's EGFR is NOT<45 within past 12 mos.     Recent Labs   Lab Test 12/01/18  0825   GFRESTIMATED 84   GFRESTBLACK >90       Recent Labs   Lab Test 12/01/18  0825   CR 0.90             Passed - Patient does NOT have a diagnosis of CHF.        Passed - Medication is active on med list        Passed - Recent (6 mo) or future (30 days) visit within the authorizing provider's specialty     Patient had office visit in the last 6 months or has a visit in the next 30 days with authorizing provider or within the authorizing provider's specialty.  See \"Patient Info\" tab in inbasket, or \"Choose Columns\" in Meds & Orders section of the refill " encounter.

## 2019-07-12 ENCOUNTER — OFFICE VISIT (OUTPATIENT)
Dept: ORTHOPEDICS | Facility: CLINIC | Age: 69
End: 2019-07-12
Payer: COMMERCIAL

## 2019-07-12 VITALS
WEIGHT: 229 LBS | BODY MASS INDEX: 34.71 KG/M2 | SYSTOLIC BLOOD PRESSURE: 122 MMHG | DIASTOLIC BLOOD PRESSURE: 66 MMHG | HEIGHT: 68 IN

## 2019-07-12 DIAGNOSIS — M75.112 NONTRAUMATIC INCOMPLETE TEAR OF LEFT ROTATOR CUFF: Primary | ICD-10-CM

## 2019-07-12 DIAGNOSIS — E11.9 TYPE 2 DIABETES MELLITUS WITHOUT COMPLICATION, WITHOUT LONG-TERM CURRENT USE OF INSULIN (H): ICD-10-CM

## 2019-07-12 PROCEDURE — 99203 OFFICE O/P NEW LOW 30 MIN: CPT | Performed by: ORTHOPAEDIC SURGERY

## 2019-07-12 ASSESSMENT — MIFFLIN-ST. JEOR: SCORE: 1788.24

## 2019-07-12 NOTE — LETTER
7/12/2019         RE: Francisco Thornton  [id number] [facility] [id number]        Dear Colleague,    Thank you for referring your patient, Francisco Thornton, to the [facility]. Please see a copy of my visit note below.    CHIEF CONCERN:  Left shoulder pain    HISTORY OF PRESENT ILLNESS:  Mr. Thornton is a 69 year old gentleman who reports left shoulder pain that has been on-going for the last 3 months with worsening pain over the last 6 weeks.  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 Patient reports pain in the upper arm, he states the pain feels like it radiates down the arm. He notes increased pain with raising the arm above shoulder height. He states he wakes often due to shoulder pain especially when rolling onto the left side. Patient notes weakness of the shoulder at chest height. Denies numbness and tingling of the left shoulder and arm.    He notes that his injection on 4/9/19 make him 50% better.    Current pain level: 0/10, Worst pain level: 8/10    Current treatment: iciirma Norco (as needed last rx 4/9/19), Asprin 325mg 4x a day.     Past Medical History:   Diagnosis Date     Controlled type 2 diabetes mellitus without complication, without long-term current use of insulin (H) 06/20/2013     Diabetes (H)      Hyperlipidemia LDL goal <70      Hypertension goal BP (blood pressure) < 140/90      Obesity, unspecified      Prostatitis, unspecified      Unspecified sinusitis (chronic)        Past Surgical History:   Procedure Laterality Date     C NONSPECIFIC PROCEDURE      S/P vasectomy       Current Outpatient Medications   Medication Sig Dispense Refill     aspirin 325 MG tablet take 325 mg by mouth as needed.       blood glucose (NO BRAND SPECIFIED) lancets standard Use to test blood sugar 1-2 times daily or as directed. 100 each 11     blood glucose (ONETOUCH ULTRA) test strip TEST 1-2  TIMES DAILY AS DIRECTED 100 strip 1     blood glucose monitoring (ACCU-CHEK JERAD PLUS) meter device kit Use to test blood sugars 2 times daily or as directed. 1 kit 0     blood glucose monitoring (ACCU-CHEK FASTCLIX) lancets   6     blood glucose monitoring (NO BRAND SPECIFIED) meter device kit Use to test blood sugar 1-2 times daily or as directed.  With test strips and all needed suplies 1 kit 0     cyclobenzaprine (FLEXERIL) 10 MG tablet TAKE 1/2 TABLET BY MOUTH DURING THE DAY AND 1 TABLET AT BEDTIME AS NEEDED FOR MUSCLE SPASMS 30 tablet 0     glimepiride (AMARYL) 4 MG tablet TAKE 2 TABLETS (8 MG) BY MOUTH EVERY MORNING (BEFORE BREAKFAST) 60 tablet 0     lisinopril (PRINIVIL/ZESTRIL) 10 MG tablet Take 1 tablet (10 mg) by mouth daily 90 tablet 1     metFORMIN (GLUCOPHAGE) 1000 MG tablet TAKE 1 TABLET (1,000 MG) BY MOUTH 2 TIMES DAILY (WITH MEALS). NEEDS APPT. 180 tablet 1     naproxen (NAPROSYN) 500 MG tablet TAKE 1 TABLET (500 MG) BY MOUTH 2 TIMES DAILY AS NEEDED FOR MODERATE PAIN - TAKE WITH FOOD 60 tablet 0     simvastatin (ZOCOR) 40 MG tablet TAKE 1 TABLET (40 MG) BY MOUTH AT BEDTIME 90 tablet 2        No Known Allergies    SOCIAL HISTORY:    Social History     Socioeconomic History     Marital status:      Spouse name: Not on file     Number of children: 1     Years of education: Not on file     Highest education level: Not on file   Occupational History     Not on file   Social Needs     Financial resource strain: Not on file     Food insecurity:     Worry: Not on file     Inability: Not on file     Transportation needs:     Medical: Not on file     Non-medical: Not on file   Tobacco Use     Smoking status: Former Smoker     Packs/day: 1.00     Years: 25.00     Pack years: 25.00     Types: Cigarettes     Last attempt to quit: 2012     Years since quittin.1     Smokeless tobacco: Never Used     Tobacco comment:  PPD   Substance and Sexual Activity     Alcohol use: No     Drug use: No      Sexual activity: Yes     Partners: Female   Lifestyle     Physical activity:     Days per week: Not on file     Minutes per session: Not on file     Stress: Not on file   Relationships     Social connections:     Talks on phone: Not on file     Gets together: Not on file     Attends Orthodox service: Not on file     Active member of club or organization: Not on file     Attends meetings of clubs or organizations: Not on file     Relationship status: Not on file     Intimate partner violence:     Fear of current or ex partner: Not on file     Emotionally abused: Not on file     Physically abused: Not on file     Forced sexual activity: Not on file   Other Topics Concern     Parent/sibling w/ CABG, MI or angioplasty before 65F 55M? No   Social History Narrative     Not on file       FAMILY HISTORY: Reviewed in EMR      REVIEW OF SYSTEMS: Positive for that noted in past medical history and history of present illness and otherwise reviewed in EMR     PHYSICAL EXAM:    Adult male in no acute distress. Articulates and communicates with normal affect.  Respirations even and unlabored  Focused upper extremity exam: Skin intact. No erythema. Sensation intact all dermatomes into the hand to light touch. EPL, FPL, and Intrinsics intact. Right shoulder active motion is FE to 170, ER at side to 60, and IR to T10. Left shoulder active motion is FE to 140 (passive to 150), ER to 20, and IR to L5.  Negative Neer and Benitez. No pain on palpation over the AC joint. Mild pain on palpation over the long head of the biceps. Strength is 4/5 in FE on left compared to 5/5 on right. ER strength is 5/5.    IMAGING:  I reviewed the Left shoulder MRI dated 6/13/19 and I agree with the findings below  IMPRESSION:  1. 1.3 cm combined full-thickness and partial thickness supraspinatus  tendon tear anteriorly. Moderate adjacent tendinosis.  2. Mild infraspinatus tendinosis.  3. Some findings which can be seen in association with  impingement  syndrome, in the appropriate clinical setting.   4. Small focus of prominent subscapularis tendinosis.  5. Mild tendinosis at the biceps tendon anchor. Tenosynovitis.  6. Very mild effusion.  7. Probable lipoma at the superior aspect of the supraspinatus muscle,  as above.    PACO GODWIN MD       ASSESSMENT:    1. Left small anterior near full vs full thickness supraspinatus tear  2. Left long head biceps disease    PLAN:  Discussed that given his treatment thus far, his exam today, and the small tear on his MRI I would encourage him to try a formal PT program. He very well may be able to avoid surgery (based on available evidence based medicine of his condition). If he does not see adequate improvement in his symptoms with a 4-6 week course of PT he will return and we will discuss moving forward with surgical intervention. He was agreeable to this and I referred him to [facility] in [place]. He will arrange that appointment and a follow up with me thereafter.    Yen Spann MD      Again, thank you for allowing me to participate in the care of your patient.        Sincerely,        Yen Spann MD

## 2019-07-12 NOTE — TELEPHONE ENCOUNTER
"Requested Prescriptions   Pending Prescriptions Disp Refills     glimepiride (AMARYL) 4 MG tablet [Pharmacy Med Name: GLIMEPIRIDE 4 MG TABLET]        Last Written Prescription Date:  6.4.19  Last Fill Quantity: 60 tablet,  # refills: 0   Last office visit: 6/11/2019 with prescribing provider:  Geovany Perkins MD           Future Office Visit:       60 tablet 0     Sig: TAKE 2 TABLETS (8 MG) BY MOUTH EVERY MORNING (BEFORE BREAKFAST). NEEDS APPT.       Sulfonylurea Agents Passed - 7/12/2019 11:37 AM        Passed - Blood pressure less than 140/90 in past 6 months     BP Readings from Last 3 Encounters:   06/11/19 120/80   04/09/19 118/70   03/11/19 118/70                 Passed - Patient has documented LDL within the past 12 mos.     Recent Labs   Lab Test 12/01/18  0825   LDL 95             Passed - Patient has had a Microalbumin in the past 15 mos.     Recent Labs   Lab Test 11/13/18  1441   MICROL 5   UMALCR 13.64             Passed - Patient has documented A1c within the specified period of time.     If HgbA1C is 8 or greater, it needs to be on file within the past 3 months.  If less than 8, must be on file within the past 6 months.     Recent Labs   Lab Test 06/11/19  1553   A1C 7.9*             Passed - Medication is active on med list        Passed - Patient is age 18 or older        Passed - Patient has a recent creatinine (normal) within the past 12 mos.     Recent Labs   Lab Test 12/01/18  0825   CR 0.90             Passed - Recent (6 mo) or future (30 days) visit within the authorizing provider's specialty     Patient had office visit in the last 6 months or has a visit in the next 30 days with authorizing provider or within the authorizing provider's specialty.  See \"Patient Info\" tab in inbasket, or \"Choose Columns\" in Meds & Orders section of the refill encounter.            "

## 2019-07-12 NOTE — PROGRESS NOTES
CHIEF CONCERN:  Left shoulder pain    HISTORY OF PRESENT ILLNESS:  Mr. Thornton is a 69 year old gentleman who reports left shoulder pain that has been on-going for the last 3 months with worsening pain over the last 6 weeks.  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 Patient reports pain in the upper arm, he states the pain feels like it radiates down the arm. He notes increased pain with raising the arm above shoulder height. He states he wakes often due to shoulder pain especially when rolling onto the left side. Patient notes weakness of the shoulder at chest height. Denies numbness and tingling of the left shoulder and arm.    He notes that his injection on 4/9/19 make him 50% better.    Current pain level: 0/10, Worst pain level: 8/10    Current treatment: icing, Norco (as needed last rx 4/9/19), Asprin 325mg 4x a day.     Past Medical History:   Diagnosis Date     Controlled type 2 diabetes mellitus without complication, without long-term current use of insulin (H) 06/20/2013     Diabetes (H)      Hyperlipidemia LDL goal <70      Hypertension goal BP (blood pressure) < 140/90      Obesity, unspecified      Prostatitis, unspecified      Unspecified sinusitis (chronic)        Past Surgical History:   Procedure Laterality Date     C NONSPECIFIC PROCEDURE      S/P vasectomy       Current Outpatient Medications   Medication Sig Dispense Refill     aspirin 325 MG tablet take 325 mg by mouth as needed.       blood glucose (NO BRAND SPECIFIED) lancets standard Use to test blood sugar 1-2 times daily or as directed. 100 each 11     blood glucose (ONETOUCH ULTRA) test strip TEST 1-2 TIMES DAILY AS DIRECTED 100 strip 1     blood glucose monitoring (ACCU-CHEK JERAD PLUS) meter device kit Use to test blood sugars 2 times daily or as directed. 1 kit 0     blood glucose monitoring (ACCU-CHEK FASTCLIX) lancets   6     blood glucose monitoring (NO  BRAND SPECIFIED) meter device kit Use to test blood sugar 1-2 times daily or as directed.  With test strips and all needed suplies 1 kit 0     cyclobenzaprine (FLEXERIL) 10 MG tablet TAKE 1/2 TABLET BY MOUTH DURING THE DAY AND 1 TABLET AT BEDTIME AS NEEDED FOR MUSCLE SPASMS 30 tablet 0     glimepiride (AMARYL) 4 MG tablet TAKE 2 TABLETS (8 MG) BY MOUTH EVERY MORNING (BEFORE BREAKFAST) 60 tablet 0     lisinopril (PRINIVIL/ZESTRIL) 10 MG tablet Take 1 tablet (10 mg) by mouth daily 90 tablet 1     metFORMIN (GLUCOPHAGE) 1000 MG tablet TAKE 1 TABLET (1,000 MG) BY MOUTH 2 TIMES DAILY (WITH MEALS). NEEDS APPT. 180 tablet 1     naproxen (NAPROSYN) 500 MG tablet TAKE 1 TABLET (500 MG) BY MOUTH 2 TIMES DAILY AS NEEDED FOR MODERATE PAIN - TAKE WITH FOOD 60 tablet 0     simvastatin (ZOCOR) 40 MG tablet TAKE 1 TABLET (40 MG) BY MOUTH AT BEDTIME 90 tablet 2        No Known Allergies    SOCIAL HISTORY:    Social History     Socioeconomic History     Marital status:      Spouse name: Not on file     Number of children: 1     Years of education: Not on file     Highest education level: Not on file   Occupational History     Not on file   Social Needs     Financial resource strain: Not on file     Food insecurity:     Worry: Not on file     Inability: Not on file     Transportation needs:     Medical: Not on file     Non-medical: Not on file   Tobacco Use     Smoking status: Former Smoker     Packs/day: 1.00     Years: 25.00     Pack years: 25.00     Types: Cigarettes     Last attempt to quit: 2012     Years since quittin.1     Smokeless tobacco: Never Used     Tobacco comment:  PPD   Substance and Sexual Activity     Alcohol use: No     Drug use: No     Sexual activity: Yes     Partners: Female   Lifestyle     Physical activity:     Days per week: Not on file     Minutes per session: Not on file     Stress: Not on file   Relationships     Social connections:     Talks on phone: Not on file     Gets together: Not  on file     Attends Moravian service: Not on file     Active member of club or organization: Not on file     Attends meetings of clubs or organizations: Not on file     Relationship status: Not on file     Intimate partner violence:     Fear of current or ex partner: Not on file     Emotionally abused: Not on file     Physically abused: Not on file     Forced sexual activity: Not on file   Other Topics Concern     Parent/sibling w/ CABG, MI or angioplasty before 65F 55M? No   Social History Narrative     Not on file       FAMILY HISTORY: Reviewed in EMR      REVIEW OF SYSTEMS: Positive for that noted in past medical history and history of present illness and otherwise reviewed in EMR     PHYSICAL EXAM:    Adult male in no acute distress. Articulates and communicates with normal affect.  Respirations even and unlabored  Focused upper extremity exam: Skin intact. No erythema. Sensation intact all dermatomes into the hand to light touch. EPL, FPL, and Intrinsics intact. Right shoulder active motion is FE to 170, ER at side to 60, and IR to T10. Left shoulder active motion is FE to 140 (passive to 150), ER to 20, and IR to L5.  Negative Neer and Benitez. No pain on palpation over the AC joint. Mild pain on palpation over the long head of the biceps. Strength is 4/5 in FE on left compared to 5/5 on right. ER strength is 5/5.    IMAGING:  I reviewed the Left shoulder MRI dated 6/13/19 and I agree with the findings below  IMPRESSION:  1. 1.3 cm combined full-thickness and partial thickness supraspinatus  tendon tear anteriorly. Moderate adjacent tendinosis.  2. Mild infraspinatus tendinosis.  3. Some findings which can be seen in association with impingement  syndrome, in the appropriate clinical setting.   4. Small focus of prominent subscapularis tendinosis.  5. Mild tendinosis at the biceps tendon anchor. Tenosynovitis.  6. Very mild effusion.  7. Probable lipoma at the superior aspect of the supraspinatus muscle,  as  above.    PACO GODWIN MD       ASSESSMENT:    1. Left small anterior near full vs full thickness supraspinatus tear  2. Left long head biceps disease    PLAN:  Discussed that given his treatment thus far, his exam today, and the small tear on his MRI I would encourage him to try a formal PT program. He very well may be able to avoid surgery (based on available evidence based medicine of his condition). If he does not see adequate improvement in his symptoms with a 4-6 week course of PT he will return and we will discuss moving forward with surgical intervention. He was agreeable to this and I referred him to [facility] in [place]. He will arrange that appointment and a follow up with me thereafter.    Yen Spann MD

## 2019-07-15 DIAGNOSIS — M54.9 UPPER BACK PAIN: ICD-10-CM

## 2019-07-15 RX ORDER — GLIMEPIRIDE 4 MG/1
8 TABLET ORAL
Qty: 180 TABLET | Refills: 0 | Status: SHIPPED | OUTPATIENT
Start: 2019-07-15 | End: 2019-10-02

## 2019-07-15 NOTE — TELEPHONE ENCOUNTER
cyclobenzaprine (FLEXERIL) 10 MG tablet              Last Written Prescription Date:  6.28.19  Last Fill Quantity: 30 tablet,   # refills: 0  Last Office Visit: 6.11.19  Future Office visit:    Next 5 appointments (look out 90 days)    Aug 23, 2019  3:45 PM CDT  Return Visit with Yen Spann MD  [facility] ([facility]) [address] [address] [address]  [phone]           Routing refill request to provider for review/approval because:  Drug not on the [facility], [facility] or [facility] refill protocol or controlled substance

## 2019-07-16 RX ORDER — CYCLOBENZAPRINE HCL 10 MG
TABLET ORAL
Qty: 30 TABLET | Refills: 0 | Status: SHIPPED | OUTPATIENT
Start: 2019-07-16 | End: 2019-08-10

## 2019-07-19 ENCOUNTER — THERAPY VISIT (OUTPATIENT)
Dept: PHYSICAL THERAPY | Facility: CLINIC | Age: 69
End: 2019-07-19
Payer: COMMERCIAL

## 2019-07-19 DIAGNOSIS — M25.512 LEFT SHOULDER PAIN: ICD-10-CM

## 2019-07-19 PROCEDURE — 97110 THERAPEUTIC EXERCISES: CPT | Mod: GP | Performed by: PHYSICAL THERAPIST

## 2019-07-19 PROCEDURE — 97112 NEUROMUSCULAR REEDUCATION: CPT | Mod: GP | Performed by: PHYSICAL THERAPIST

## 2019-07-19 PROCEDURE — 97161 PT EVAL LOW COMPLEX 20 MIN: CPT | Mod: GP | Performed by: PHYSICAL THERAPIST

## 2019-07-19 NOTE — PROGRESS NOTES
[facility] Initial Evaluation  Subjective:  Physical Therapy Initial Evaluation   7/19/19   Precautions/Restrictions/MD instructions: PT eval and treat   Therapist Impression:   Pt is a 70 y/o male, with 3 month history of left shoulder pain. Pt presents with pain, decreased ROM/mobility, poor posture and decreased strength. These impairments limit their ability to reach (overhead, behind back), sleep and  Lift overhead. Skilled PT services necessary in order to reduce impairments and improve independent function.    Subjective:   Chief Complaint: L shoulder pain, insidous onset and progressively has gotten worse  DOI/onset: 4/30/19  DOS: none   Location: L shoulder  Quality: dull/achy   Frequency: constant  Radiates: pain radiates into upper and lower arm  Pain scale: Rest 3/10 Activity 8/10    Sleeping: >3X/night due to pain (prefers left side sleeping)   Exacerbated by: reaching, lifting  Relieved by: hydrocodone, ice  Progression: getting worse   Previous Treatment: none  Effect of prior treatment: n/a   PMH and/or surgical history: none   Imaging: MRI     Occupation:  (building)  Job duties: lifting, carrying, push, pull    Current HEP/exercise regimen: none  Patient's goals: painfree with daily tasks   Medications: diabetes   General health as reported by patient: fair  Return to MD: as needed (Dr. Spann)   Red Flags: Diabetes Type ll       HPI                    Objective:  SHOULDER:    Standing Posture: rounded shoulders and forward head    T-Spine Mobility:  Hypo    Shoulder: (* indicates patient's pain)   PROM L PROM R AROM L AROM R MMT L MMT R   Flex   90* 150 4/5** 5/5   Abd   85* 170     IR 20* 30   4/5* 5/5   ER 30* 90 30* 50 4-/5 5/5   Ext/IR   L buttock ** T11         Palpation: tender to L biceps, supra, infra, teres, subscap/lat,         GH/Capsular Mobility  L  R   Posterior glide hypo hypo   Inferior glide hypo hypo   Anterior glide         System    Physical  Exam    General     ROS    Assessment/Plan:    Patient is a 69 year old male with left side shoulder complaints.    Patient has the following significant findings with corresponding treatment plan.                Diagnosis 1:  L shoulder pain  Pain -  hot/cold therapy, US, manual therapy, splint/taping/bracing/orthotics, self management, education and home program  Decreased ROM/flexibility - manual therapy and therapeutic exercise  Decreased joint mobility - manual therapy and therapeutic exercise  Decreased strength - therapeutic exercise and therapeutic activities  Inflammation - cold therapy and self management/home program  Impaired muscle performance - neuro re-education  Decreased function - therapeutic activities  Impaired posture - neuro re-education  Instability -  Therapeutic Activity  Therapeutic Exercise    Therapy Evaluation Codes:   1) Decision-Making    Low complexity using standardized patient assessment instrument and/or measureable assessment of functional outcome.  Cumulative Therapy Evaluation is: Low complexity.    Previous and current functional limitations:  (See Goal Flow Sheet for this information)    Short term and Long term goals: (See Goal Flow Sheet for this information)     Communication ability:  Patient appears to be able to clearly communicate and understand verbal and written communication and follow directions correctly.  Treatment Explanation - The following has been discussed with the patient:   RX ordered/plan of care  Anticipated outcomes  Possible risks and side effects  This patient would benefit from PT intervention to resume normal activities.   Rehab potential is good.    Frequency:  1 X week, once daily  Duration:  for 6 weeks  Discharge Plan:  Achieve all LTG.  Independent in home treatment program.  Reach maximal therapeutic benefit.    Please refer to the daily flowsheet for treatment today, total treatment time and time spent performing 1:1 timed codes.

## 2019-07-24 ENCOUNTER — THERAPY VISIT (OUTPATIENT)
Dept: PHYSICAL THERAPY | Facility: CLINIC | Age: 69
End: 2019-07-24
Payer: COMMERCIAL

## 2019-07-24 DIAGNOSIS — M25.512 LEFT SHOULDER PAIN: ICD-10-CM

## 2019-07-24 PROCEDURE — 97140 MANUAL THERAPY 1/> REGIONS: CPT | Mod: GP | Performed by: PHYSICAL THERAPIST

## 2019-07-24 PROCEDURE — 97110 THERAPEUTIC EXERCISES: CPT | Mod: GP | Performed by: PHYSICAL THERAPIST

## 2019-08-02 ENCOUNTER — THERAPY VISIT (OUTPATIENT)
Dept: PHYSICAL THERAPY | Facility: CLINIC | Age: 69
End: 2019-08-02
Payer: COMMERCIAL

## 2019-08-02 DIAGNOSIS — M25.512 LEFT SHOULDER PAIN: ICD-10-CM

## 2019-08-02 PROCEDURE — 97140 MANUAL THERAPY 1/> REGIONS: CPT | Mod: GP | Performed by: PHYSICAL THERAPIST

## 2019-08-02 PROCEDURE — 97110 THERAPEUTIC EXERCISES: CPT | Mod: GP | Performed by: PHYSICAL THERAPIST

## 2019-08-09 ENCOUNTER — THERAPY VISIT (OUTPATIENT)
Dept: PHYSICAL THERAPY | Facility: CLINIC | Age: 69
End: 2019-08-09
Payer: COMMERCIAL

## 2019-08-09 DIAGNOSIS — M25.512 LEFT SHOULDER PAIN: ICD-10-CM

## 2019-08-09 PROCEDURE — 97140 MANUAL THERAPY 1/> REGIONS: CPT | Mod: GP | Performed by: PHYSICAL THERAPIST

## 2019-08-09 PROCEDURE — 97110 THERAPEUTIC EXERCISES: CPT | Mod: GP | Performed by: PHYSICAL THERAPIST

## 2019-08-10 DIAGNOSIS — M54.9 UPPER BACK PAIN: ICD-10-CM

## 2019-08-12 NOTE — TELEPHONE ENCOUNTER
Requested Prescriptions   Pending Prescriptions Disp Refills     cyclobenzaprine (FLEXERIL) 10 MG tablet [Pharmacy Med Name:  Last Written Prescription Date:  7/16/19  Last Fill Quantity: 30,  # refills: 0   Last Office Visit: 6/11/2019   Future Office Visit:    Next 5 appointments (look out 90 days)    Aug 23, 2019  3:45 PM CDT  Return Visit with Yen Spann MD  [facility] ([facility]) [address] [address] [address]  [phone]          CYCLOBENZAPRINE 10 MG TABLET] 30 tablet 0     Sig: TAKE 1/2 TABLET BY MOUTH DURING THE DAY AND 1 TABLET AT BEDTIME AS NEEDED FOR MUSCLE SPASMS       There is no refill protocol information for this order

## 2019-08-13 RX ORDER — CYCLOBENZAPRINE HCL 10 MG
TABLET ORAL
Qty: 30 TABLET | Refills: 0 | Status: SHIPPED | OUTPATIENT
Start: 2019-08-13 | End: 2020-05-18

## 2019-08-16 ENCOUNTER — THERAPY VISIT (OUTPATIENT)
Dept: PHYSICAL THERAPY | Facility: CLINIC | Age: 69
End: 2019-08-16
Payer: COMMERCIAL

## 2019-08-16 DIAGNOSIS — M25.512 LEFT SHOULDER PAIN: ICD-10-CM

## 2019-08-16 PROCEDURE — 97110 THERAPEUTIC EXERCISES: CPT | Mod: GP | Performed by: PHYSICAL THERAPIST

## 2019-08-16 PROCEDURE — 97140 MANUAL THERAPY 1/> REGIONS: CPT | Mod: GP | Performed by: PHYSICAL THERAPIST

## 2019-08-16 NOTE — PROGRESS NOTES
Subjective:  HPI                    Objective:  System    Physical Exam    General     ROS    Assessment/Plan:    DISCHARGE REPORT    Progress reporting period is from 7/19/19 to 8/16/19.       SUBJECTIVE  Subjective: No real change in symptoms. Shoulder gets sore with work activities (not sore or painful during the activity, but after flares up). In the night, when sleeping can get chest pain on left side (described as a muscle pain). Stretches at home were going well until flared up at work. Pt feels better after manual therapy but then aggrevates at work. Pt also notes exteme pain with arm extended lifting (even light objects). Ice helps temporarily, and NSAID's (aleve) doesn't really help at all. Follow up with Dr. Spann this Friday.     Current Pain level: 4/10(at worst 9/10).      Initial Pain level: 8/10.   Changes in function:  None  Adverse reaction to treatment or activity: None    OBJECTIVE  Changes noted in objective findings:  None  Objective: AROM: L shoulder flexion=91*; Abd=60*; ER=35*; Ext/IR/add=L buttock **. All painful motions at end range. MMT flexion=3+/5 (pain); ER=5/5 (no pain); IR=5/5 (no pain). Tightness to L UT, levator, infra, subscap and lat; tolerated manual therapy well again today. Educated pt to continue wand stretching and gentle ROM; even if plans on having surgery, the more mobility and strength in the shoulder going into it will be beneficial.      ASSESSMENT/PLAN  Updated problem list and treatment plan: Diagnosis 1:  L shoulder pain  Pain -  home program  Decreased ROM/flexibility - home program  Decreased joint mobility - home program  Decreased strength - home program  Inflammation - self management/home program  Impaired muscle performance - home program  Decreased function - home program  Impaired posture - home program  STG/LTGs have been met or progress has been made towards goals:  None  Assessment of Progress: The patient's condition is unchanged.  Self Management  Plans:  Patient has been instructed in a home treatment program.  I have re-evaluated this patient and find that the nature, scope, duration and intensity of the therapy is appropriate for the medical condition of the patient.  Francisco continues to require the following intervention to meet STG and LTG's:  PT intervention is no longer required to meet STG/LTG.    Recommendations:  This patient would benefit from further evaluation, will follow up with Dr. Spann at [facility] in [place].     Please refer to the daily flowsheet for treatment today, total treatment time and time spent performing 1:1 timed codes.

## 2019-08-23 ENCOUNTER — OFFICE VISIT (OUTPATIENT)
Dept: ORTHOPEDICS | Facility: CLINIC | Age: 69
End: 2019-08-23
Payer: COMMERCIAL

## 2019-08-23 ENCOUNTER — TELEPHONE (OUTPATIENT)
Dept: ORTHOPEDICS | Facility: CLINIC | Age: 69
End: 2019-08-23

## 2019-08-23 VITALS
BODY MASS INDEX: 34.71 KG/M2 | WEIGHT: 229 LBS | DIASTOLIC BLOOD PRESSURE: 66 MMHG | SYSTOLIC BLOOD PRESSURE: 128 MMHG | HEIGHT: 68 IN

## 2019-08-23 DIAGNOSIS — M75.122 COMPLETE TEAR OF LEFT ROTATOR CUFF, UNSPECIFIED WHETHER TRAUMATIC: Primary | ICD-10-CM

## 2019-08-23 PROCEDURE — 99213 OFFICE O/P EST LOW 20 MIN: CPT | Performed by: ORTHOPAEDIC SURGERY

## 2019-08-23 ASSESSMENT — MIFFLIN-ST. JEOR: SCORE: 1788.24

## 2019-08-23 NOTE — TELEPHONE ENCOUNTER
Scheduled surgery.     Type of surgery: Left arthroscopic rotator cuff repair, subacromial decompression, open biceps tenodesis  Location of surgery: [facility]  Date and time of surgery: 9/6/19 @ 11:10am  Surgeon: Zana  Pre-Op Appt Date: Patient will call to schedule  Post-Op Appt Date: 9/20/19   Packet sent out: Yes  Pre-cert/Authorization completed:  emailed [facility].   Date: 8/23/19      [facility] Scheduler

## 2019-08-23 NOTE — PROGRESS NOTES
CHIEF CONCERN:  Left shoulder pain     HISTORY OF PRESENT ILLNESS:  Mr. Thornton is here today in follow up for left shoulder pain. He was previously seen on 7/12/19, and since has completed 5 sessions of physical therapy. He states that he has noticed no lasting improvement with physical therapy, he would notice mild relief of discomfort for the remainder of the day follow Physical Therapy, but symptoms would soon return. He continues to have pain radiating down the upper arm, and more recently into the left anterior aspect of the chest. He has been utilizing Aleve, ice, heat, and hot showers for pain relief. Current pain level: 5/10, and worst pain level 9/10.     PHYSICAL EXAM:    Adult male in no acute distress. Articulates and communicates with normal affect.  Respirations even and unlabored  Focused upper extremity exam: Skin intact. No erythema. Sensation intact all dermatomes into the hand to light touch. EPL, FPL, and Intrinsics intact. Left shoulder active motion is FE to 130, ER to 40, and IR to L5 with pain.  Positive Neer and Benitez. No pain on palpation over the AC joint. There is pain on palpation over the long head of the biceps. Positive Speeds and OBriens. FE strength is 4/5 and limited by pain. ER strength is 4-/5.    IMAGING:  None new    ASSESSMENT:  1. Left small anterior near full vs full thickness supraspinatus tear  2. Left long head biceps disease    PLAN:  I reviewed the patient's progress with PT. He notes that his shoulder pain is limiting daily activities such as reaching away but he is also having pain at night interrupting sleep. Physical therapy has not adequately improved these symptoms. Recall that he has also had nonoperative treatment from his primary care physician since March including an injection in April. We discussed the risks and benefits of surgical treatment in the form of arthroscopic rotator cuff repair, subacromial decompression, open biceps tenodesis. We reviewed  the expected postoperative rehab and restrictions. He would like to pursue surgical intervention as described. We will assist him with surgical scheduling.     Yen Spann MD

## 2019-08-23 NOTE — LETTER
8/23/2019         RE: Francisco Thornton  [address] [address] [facility] [address]        Dear Colleague,    Thank you for referring your patient, Francisco Thornton, to the [facility] ORTHOPEDIC SURGERY. Please see a copy of my visit note below.    CHIEF CONCERN:  Left shoulder pain     HISTORY OF PRESENT ILLNESS:  Mr. Thornton is here today in follow up for left shoulder pain. He was previously seen on 7/12/19, and since has completed 5 sessions of physical therapy. He states that he has noticed no lasting improvement with physical therapy, he would notice mild relief of discomfort for the remainder of the day follow Physical Therapy, but symptoms would soon return. He continues to have pain radiating down the upper arm, and more recently into the left anterior aspect of the chest. He has been utilizing Aleve, ice, heat, and hot showers for pain relief. Current pain level: 5/10, and worst pain level 9/10.     PHYSICAL EXAM:    Adult male in no acute distress. Articulates and communicates with normal affect.  Respirations even and unlabored  Focused upper extremity exam: Skin intact. No erythema. Sensation intact all dermatomes into the hand to light touch. EPL, FPL, and Intrinsics intact. Left shoulder active motion is FE to 130, ER to 40, and IR to L5 with pain.  Positive Neer and Benitez. No pain on palpation over the AC joint. There is pain on palpation over the long head of the biceps. Positive Speeds and OBriens. FE strength is 4/5 and limited by pain. ER strength is 4-/5.    IMAGING:  None new    ASSESSMENT:  1. Left small anterior near full vs full thickness supraspinatus tear  2. Left long head biceps disease    PLAN:  I reviewed the patient's progress with PT. He notes that his shoulder pain is limiting daily activities such as reaching away but he is also having pain at night interrupting sleep. Physical therapy has not adequately improved these symptoms. Recall that he has also had  nonoperative treatment from his primary care physician since March including an injection in April. We discussed the risks and benefits of surgical treatment in the form of arthroscopic rotator cuff repair, subacromial decompression, open biceps tenodesis. We reviewed the expected postoperative rehab and restrictions. He would like to pursue surgical intervention as described. We will assist him with surgical scheduling.     Yen Spann MD      Again, thank you for allowing me to participate in the care of your patient.        Sincerely,        Yen Spann MD

## 2019-08-26 NOTE — TELEPHONE ENCOUNTER
Patient called to move surgery date out to end of September.   Surgery location and procedure remains the same. See new surgery date and time.       Date and time of surgery: 9/24/19 @ 0950am  Pre-Op Appt Date: patient will call his primary clinic  Post-Op Appt Date: patient will call back to schedule.    Date: 8/26/19      [facility] Surgery Scheduler

## 2019-09-12 ENCOUNTER — OFFICE VISIT (OUTPATIENT)
Dept: FAMILY MEDICINE | Facility: CLINIC | Age: 69
End: 2019-09-12
Payer: COMMERCIAL

## 2019-09-12 VITALS
WEIGHT: 225 LBS | TEMPERATURE: 98.7 F | HEART RATE: 102 BPM | BODY MASS INDEX: 34.1 KG/M2 | HEIGHT: 68 IN | OXYGEN SATURATION: 96 % | SYSTOLIC BLOOD PRESSURE: 124 MMHG | DIASTOLIC BLOOD PRESSURE: 72 MMHG

## 2019-09-12 DIAGNOSIS — S46.002D INJURY OF LEFT ROTATOR CUFF, SUBSEQUENT ENCOUNTER: ICD-10-CM

## 2019-09-12 DIAGNOSIS — I10 HYPERTENSION GOAL BP (BLOOD PRESSURE) < 140/90: ICD-10-CM

## 2019-09-12 DIAGNOSIS — E11.9 CONTROLLED TYPE 2 DIABETES MELLITUS WITHOUT COMPLICATION, WITHOUT LONG-TERM CURRENT USE OF INSULIN (H): ICD-10-CM

## 2019-09-12 DIAGNOSIS — Z01.818 PREOP GENERAL PHYSICAL EXAM: Primary | ICD-10-CM

## 2019-09-12 LAB
ERYTHROCYTE [DISTWIDTH] IN BLOOD BY AUTOMATED COUNT: 12.7 % (ref 10–15)
HBA1C MFR BLD: 7.1 % (ref 0–5.6)
HCT VFR BLD AUTO: 47.9 % (ref 40–53)
HGB BLD-MCNC: 16.9 G/DL (ref 13.3–17.7)
MCH RBC QN AUTO: 31.8 PG (ref 26.5–33)
MCHC RBC AUTO-ENTMCNC: 35.3 G/DL (ref 31.5–36.5)
MCV RBC AUTO: 90 FL (ref 78–100)
PLATELET # BLD AUTO: 223 10E9/L (ref 150–450)
RBC # BLD AUTO: 5.31 10E12/L (ref 4.4–5.9)
WBC # BLD AUTO: 8.5 10E9/L (ref 4–11)

## 2019-09-12 PROCEDURE — 80061 LIPID PANEL: CPT | Performed by: FAMILY MEDICINE

## 2019-09-12 PROCEDURE — 93000 ELECTROCARDIOGRAM COMPLETE: CPT | Performed by: FAMILY MEDICINE

## 2019-09-12 PROCEDURE — 85027 COMPLETE CBC AUTOMATED: CPT | Performed by: FAMILY MEDICINE

## 2019-09-12 PROCEDURE — 84443 ASSAY THYROID STIM HORMONE: CPT | Performed by: FAMILY MEDICINE

## 2019-09-12 PROCEDURE — 99215 OFFICE O/P EST HI 40 MIN: CPT | Performed by: FAMILY MEDICINE

## 2019-09-12 PROCEDURE — 83036 HEMOGLOBIN GLYCOSYLATED A1C: CPT | Performed by: FAMILY MEDICINE

## 2019-09-12 PROCEDURE — 80053 COMPREHEN METABOLIC PANEL: CPT | Performed by: FAMILY MEDICINE

## 2019-09-12 PROCEDURE — 36415 COLL VENOUS BLD VENIPUNCTURE: CPT | Performed by: FAMILY MEDICINE

## 2019-09-12 ASSESSMENT — MIFFLIN-ST. JEOR: SCORE: 1770.09

## 2019-09-12 NOTE — PROGRESS NOTES
[address] [address]  [phone]  Dept: [phone]    PRE-OP EVALUATION:  Today's date: 2019    Francisco Thornton (: 1950) presents for pre-operative evaluation assessment as requested by Dr. Spann.  He requires evaluation and anesthesia risk assessment prior to undergoing surgery/procedure for treatment of left rotator cuff.    Proposed Surgery/ Procedure: Left Arthroscopic Rotator Cuff Repair, Subacromial Decompression, Open Biceps Tendesis  Date of Surgery/ Procedure: 2019  Time of Surgery/ Procedure: 9:50 AM  Hospital/Surgical Facility: [facility]  Primary Physician: Geovany Perkins  Type of Anesthesia Anticipated: Choice    Patient has a Health Care Directive or Living Will:  NO    1. NO - Do you have a history of heart attack, stroke, stent, bypass or surgery on an artery in the head, neck, heart or legs?  2. NO - Do you ever have any pain or discomfort in your chest?  3. NO - Do you have a history of  Heart Failure?  4. NO - Are you troubled by shortness of breath when: walking on the level, up a slight hill or at night?  5. NO - Do you currently have a cold, bronchitis or other respiratory infection?  6. NO - Do you have a cough, shortness of breath or wheezing?  7. NO - Do you sometimes get pains in the calves of your legs when you walk?  8. NO - Do you or anyone in your family have previous history of blood clots?  9. NO - Do you or does anyone in your family have a serious bleeding problem such as prolonged bleeding following surgeries or cuts?  10. NO - Have you ever had problems with anemia or been told to take iron pills?  11. NO - Have you had any abnormal blood loss such as black, tarry or bloody stools, or abnormal vaginal bleeding?  12. NO - Have you ever had a blood transfusion?  13. NO - Have you or any of your relatives ever had problems with anesthesia?  14. YES - DO YOU HAVE SLEEP APNEA, EXCESSIVE SNORING  OR DAYTIME DROWSINESS? Sleep apnea  15. NO - Do you have any prosthetic heart valves?  16. NO - Do you have prosthetic joints?        HPI:     HPI related to upcoming procedure: left shoulder rotator cuff pain and no better with therapy      DIABETES - Patient has a longstanding history of DiabetesType Type II . Patient is being treated with diet, oral agents and exercise and denies significant side effects. Control has been good.     Lab Results   Component Value Date    A1C 7.1 09/12/2019     Complicating factors include but are not limited to: hypertension and hyperlipidemia.     HYPERTENSION - Patient has longstanding history of HTN , currently denies any symptoms referable to elevated blood pressure. Specifically denies chest pain, palpitations, dyspnea, orthopnea, PND or peripheral edema. Blood pressure readings have been in normal range. Current medication regimen is as listed below. Patient denies any side effects of medication.       MEDICAL HISTORY:     Patient Active Problem List    Diagnosis Date Noted     Diabetic polyneuropathy associated with type 2 diabetes mellitus (H) 07/12/2016     Priority: High     Hyperlipidemia LDL goal <70      Priority: High     Hypertension goal BP (blood pressure) < 140/90      Priority: High     Morbid obesity (H) 10/20/2015     Priority: High     Controlled type 2 diabetes mellitus without complication, without long-term current use of insulin (H) 06/20/2013     Priority: High     Degeneration of lumbar or lumbosacral intervertebral disc 02/12/2015     Priority: Medium     Spondylolisthesis of lumbar region 02/11/2015     Priority: Medium     BPH (benign prostatic hyperplasia) 06/20/2013     Priority: Medium     Advanced directives, counseling/discussion 12/31/2012     Priority: Medium     Discussed advance care planning with patient; information given to patient to review. 12/31/2012       Jennyfer See CMA            Past Medical History:   Diagnosis Date      Controlled type 2 diabetes mellitus without complication, without long-term current use of insulin (H) 06/20/2013     Diabetes (H)      Hyperlipidemia LDL goal <70      Hypertension goal BP (blood pressure) < 140/90      Obesity, unspecified      Prostatitis, unspecified      Unspecified sinusitis (chronic)      Past Surgical History:   Procedure Laterality Date     C NONSPECIFIC PROCEDURE      S/P vasectomy     Current Outpatient Medications   Medication Sig Dispense Refill     glimepiride (AMARYL) 4 MG tablet Take 2 tablets (8 mg) by mouth every morning (before breakfast) 180 tablet 0     lisinopril (PRINIVIL/ZESTRIL) 10 MG tablet Take 1 tablet (10 mg) by mouth daily 90 tablet 1     metFORMIN (GLUCOPHAGE) 1000 MG tablet TAKE 1 TABLET (1,000 MG) BY MOUTH 2 TIMES DAILY (WITH MEALS). NEEDS APPT. 180 tablet 1     simvastatin (ZOCOR) 40 MG tablet TAKE 1 TABLET (40 MG) BY MOUTH AT BEDTIME 90 tablet 2     aspirin 325 MG tablet take 325 mg by mouth as needed.       blood glucose (NO BRAND SPECIFIED) lancets standard Use to test blood sugar 1-2 times daily or as directed. 100 each 11     blood glucose (ONETOUCH ULTRA) test strip TEST 1-2 TIMES DAILY AS DIRECTED 100 strip 1     blood glucose monitoring (ACCU-CHEK JERAD PLUS) meter device kit Use to test blood sugars 2 times daily or as directed. 1 kit 0     blood glucose monitoring (ACCU-CHEK FASTCLIX) lancets   6     blood glucose monitoring (NO BRAND SPECIFIED) meter device kit Use to test blood sugar 1-2 times daily or as directed.  With test strips and all needed suplies 1 kit 0     cyclobenzaprine (FLEXERIL) 10 MG tablet TAKE 1/2 TABLET BY MOUTH DURING THE DAY AND 1 TABLET AT BEDTIME AS NEEDED FOR MUSCLE SPASMS 30 tablet 0     naproxen (NAPROSYN) 500 MG tablet TAKE 1 TABLET (500 MG) BY MOUTH 2 TIMES DAILY AS NEEDED FOR MODERATE PAIN - TAKE WITH FOOD 60 tablet 0     OTC products: None, except as noted above    No Known Allergies   Latex Allergy: NO    Social History  "    Tobacco Use     Smoking status: Former Smoker     Packs/day: 1.00     Years: 25.00     Pack years: 25.00     Types: Cigarettes     Last attempt to quit: 2012     Years since quittin.2     Smokeless tobacco: Never Used     Tobacco comment:  PPD   Substance Use Topics     Alcohol use: No     History   Drug Use No       REVIEW OF SYSTEMS:   Constitutional, neuro, ENT, endocrine, pulmonary, cardiac, gastrointestinal, genitourinary, musculoskeletal, integument and psychiatric systems are negative, except as otherwise noted.    EXAM:   /72   Pulse 102   Temp 98.7  F (37.1  C) (Oral)   Ht 1.727 m (5' 8\")   Wt 102.1 kg (225 lb)   SpO2 96%   BMI 34.21 kg/m      GENERAL APPEARANCE: healthy, alert and no distress     EYES: EOMI,  PERRL     HENT: ear canals and TM's normal and nose and mouth without ulcers or lesions     NECK: no adenopathy, no asymmetry, masses, or scars and thyroid normal to palpation     RESP: lungs clear to auscultation - no rales, rhonchi or wheezes     CV: regular rates and rhythm, normal S1 S2, no S3 or S4 and no murmur, click or rub     ABDOMEN:  soft, nontender, no HSM or masses and bowel sounds normal     MS: extremities normal- no gross deformities noted, no evidence of inflammation in joints, FROM in all extremities.     SKIN: no suspicious lesions or rashes     NEURO: Normal strength and tone, sensory exam grossly normal, mentation intact and speech normal     PSYCH: mentation appears normal. and affect normal/bright     LYMPHATICS: No cervical adenopathy    DIAGNOSTICS:   EKG: sinus bradycardia, normal axis, normal intervals, no acute ST/T changes c/w ischemia, no LVH by voltage criteria, unchanged from previous tracings    Recent Labs   Lab Test 19  1553 18  0825  17  0748 16  0738   HGB  --   --   --  16.0 16.4   PLT  --   --   --  217 196   NA  --  138  --  138  --    POTASSIUM  --  4.3  --  5.0  --    CR  --  0.90  --  0.95  --    A1C 7.9* " 7.1*   < > 8.3* 6.4*    < > = values in this interval not displayed.      Lab Results   Component Value Date    A1C 7.1 09/12/2019     IMPRESSION:   Reason for surgery/procedure:     ICD-10-CM    1. Preop general physical exam Z01.818 CBC with platelets     EKG 12-lead complete w/read - Clinics   2. Injury of left rotator cuff, subsequent encounter S46.002D    3. Controlled type 2 diabetes mellitus without complication, without long-term current use of insulin (H) - well controlled - hold oral diabetic meds E11.9 Hemoglobin A1c     TSH with free T4 reflex     Lipid panel reflex to direct LDL Fasting     Comprehensive metabolic panel   4. Hypertension goal BP (blood pressure) < 140/90 - well controlled -  Hold lisinopril the morning of surgery I10 Lipid panel reflex to direct LDL Fasting     Comprehensive metabolic panel        The proposed surgical procedure is considered INTERMEDIATE risk.    REVISED CARDIAC RISK INDEX  The patient has the following serious cardiovascular risks for perioperative complications such as (MI, PE, VFib and 3  AV Block):  No serious cardiac risks  INTERPRETATION: 0 risks: Class I (very low risk - 0.4% complication rate)    The patient has the following additional risks for perioperative complications:  Morbid obesity        RECOMMENDATIONS:     --Patient is to take all scheduled medications on the day of surgery EXCEPT for modifications listed below.    Diabetes Medication Use  -----Hold usual oral and non-insulin diabetic meds (e.g. Metformin, Actos, Glipizide) while NPO.       Anticoagulant or Antiplatelet Medication Use  ASPIRIN: Discontinue ASA 7-10 days prior to procedure to reduce bleeding risk.  It should be resumed post-operatively.  NSAIDS: Naproxen (Naprosyn):   Stop 2-3 days prior to surgery        ACE Inhibitor or Angiotensin Receptor Blocker (ARB) Use  Ace inhibitor or Angiotensin Receptor Blocker (ARB) and should HOLD this medication for the 24 hours prior to  surgery.      APPROVAL GIVEN to proceed with proposed procedure, without further diagnostic evaluation       Signed Electronically by: Geovany Perkins MD    Copy of this evaluation report is provided to requesting physician.    [place] Preop Guidelines    Revised Cardiac Risk Index

## 2019-09-13 LAB
ALBUMIN SERPL-MCNC: 4.4 G/DL (ref 3.4–5)
ALP SERPL-CCNC: 62 U/L (ref 40–150)
ALT SERPL W P-5'-P-CCNC: 62 U/L (ref 0–70)
ANION GAP SERPL CALCULATED.3IONS-SCNC: 10 MMOL/L (ref 3–14)
AST SERPL W P-5'-P-CCNC: 28 U/L (ref 0–45)
BILIRUB SERPL-MCNC: 0.7 MG/DL (ref 0.2–1.3)
BUN SERPL-MCNC: 16 MG/DL (ref 7–30)
CALCIUM SERPL-MCNC: 10 MG/DL (ref 8.5–10.1)
CHLORIDE SERPL-SCNC: 106 MMOL/L (ref 94–109)
CHOLEST SERPL-MCNC: 157 MG/DL
CO2 SERPL-SCNC: 22 MMOL/L (ref 20–32)
CREAT SERPL-MCNC: 0.82 MG/DL (ref 0.66–1.25)
GFR SERPL CREATININE-BSD FRML MDRD: >90 ML/MIN/{1.73_M2}
GLUCOSE SERPL-MCNC: 143 MG/DL (ref 70–99)
HDLC SERPL-MCNC: 32 MG/DL
LDLC SERPL CALC-MCNC: 82 MG/DL
NONHDLC SERPL-MCNC: 125 MG/DL
POTASSIUM SERPL-SCNC: 4.2 MMOL/L (ref 3.4–5.3)
PROT SERPL-MCNC: 7.7 G/DL (ref 6.8–8.8)
SODIUM SERPL-SCNC: 138 MMOL/L (ref 133–144)
TRIGL SERPL-MCNC: 216 MG/DL
TSH SERPL DL<=0.005 MIU/L-ACNC: 2.56 MU/L (ref 0.4–4)

## 2019-09-13 NOTE — RESULT ENCOUNTER NOTE
Dear Demond,    Here is a summary of your recent test results:  -Normal red blood cell (hgb) levels, normal white blood cell count and normal platelet levels.  -Cholesterol levels are at your goal levels.  ADVISE: continuing your medication, a regular exercise program with at least 150 minutes of aerobic exercise per week, and eating a low saturated fat/low carbohydrate diet.  Also, you should recheck this fasting cholesterol panel in 12 months.  -Liver and gallbladder tests (ALT,AST, Alk phos,bilirubin) are normal.  -Kidney function (GFR) is normal.  -Sodium is normal.  -Potassium is normal.  -Calcium is normal.  -Glucose is elevated due to your diabetes.  -A1C (test of diabetes control the last 2-3 months) is at your goal. Please continue with your current plan. Also, you should make an appointment to see me and recheck your A1C test in 6 months.   -TSH (thyroid stimulating hormone) level is normal which indicates normal thyroid function.    For additional lab test information, labtestsonline.org is an excellent reference.    In addition, here is a list of due or overdue Health Maintenance reminders:  Colonscopy due  Eye Exam due on 04/24/2019  Please call us at [phone] (or use iWelcome) to address the above recommendations if needed.           Thank you very much for trusting me and [facility].     Healthy regards,  Dinh Perkins MD

## 2019-09-23 ENCOUNTER — ANESTHESIA EVENT (OUTPATIENT)
Dept: SURGERY | Facility: AMBULATORY SURGERY CENTER | Age: 69
End: 2019-09-23

## 2019-09-24 ENCOUNTER — HOSPITAL ENCOUNTER (OUTPATIENT)
Facility: AMBULATORY SURGERY CENTER | Age: 69
End: 2019-09-24
Attending: ORTHOPAEDIC SURGERY
Payer: COMMERCIAL

## 2019-09-24 ENCOUNTER — ANESTHESIA (OUTPATIENT)
Dept: SURGERY | Facility: AMBULATORY SURGERY CENTER | Age: 69
End: 2019-09-24

## 2019-09-24 VITALS
DIASTOLIC BLOOD PRESSURE: 74 MMHG | RESPIRATION RATE: 14 BRPM | HEIGHT: 68 IN | BODY MASS INDEX: 35.16 KG/M2 | TEMPERATURE: 97.8 F | WEIGHT: 232 LBS | HEART RATE: 64 BPM | SYSTOLIC BLOOD PRESSURE: 128 MMHG | OXYGEN SATURATION: 94 %

## 2019-09-24 DIAGNOSIS — Z98.890 S/P ROTATOR CUFF REPAIR: Primary | ICD-10-CM

## 2019-09-24 LAB
GLUCOSE BLDC GLUCOMTR-MCNC: 160 MG/DL (ref 70–99)
GLUCOSE BLDC GLUCOMTR-MCNC: 241 MG/DL (ref 70–99)

## 2019-09-24 DEVICE — IMP ANCHOR ARTHREX BIO-SWIVELOCK 5.5MM AR-2323BCC: Type: IMPLANTABLE DEVICE | Site: SHOULDER | Status: FUNCTIONAL

## 2019-09-24 DEVICE — IMP ANCHOR ARTHREX BC SWVLK TENODESIS 6.25X19.1MM AR-1662BC: Type: IMPLANTABLE DEVICE | Site: SHOULDER | Status: FUNCTIONAL

## 2019-09-24 DEVICE — IMP ANCHOR ARTHREX BIO-SWIVELOCK 4.75X22MM AR-2324BCC-2: Type: IMPLANTABLE DEVICE | Site: SHOULDER | Status: FUNCTIONAL

## 2019-09-24 RX ORDER — ACETAMINOPHEN 325 MG/1
975 TABLET ORAL EVERY 8 HOURS
Qty: 90 TABLET | Refills: 0 | Status: SHIPPED | OUTPATIENT
Start: 2019-09-24

## 2019-09-24 RX ORDER — ONDANSETRON 2 MG/ML
INJECTION INTRAMUSCULAR; INTRAVENOUS PRN
Status: DISCONTINUED | OUTPATIENT
Start: 2019-09-24 | End: 2019-09-24

## 2019-09-24 RX ORDER — ONDANSETRON 2 MG/ML
4 INJECTION INTRAMUSCULAR; INTRAVENOUS EVERY 30 MIN PRN
Status: DISCONTINUED | OUTPATIENT
Start: 2019-09-24 | End: 2019-09-25 | Stop reason: HOSPADM

## 2019-09-24 RX ORDER — FENTANYL CITRATE 50 UG/ML
25-50 INJECTION, SOLUTION INTRAMUSCULAR; INTRAVENOUS
Status: DISCONTINUED | OUTPATIENT
Start: 2019-09-24 | End: 2019-09-25 | Stop reason: HOSPADM

## 2019-09-24 RX ORDER — PROPOFOL 10 MG/ML
INJECTION, EMULSION INTRAVENOUS PRN
Status: DISCONTINUED | OUTPATIENT
Start: 2019-09-24 | End: 2019-09-24

## 2019-09-24 RX ORDER — SODIUM CHLORIDE, SODIUM LACTATE, POTASSIUM CHLORIDE, CALCIUM CHLORIDE 600; 310; 30; 20 MG/100ML; MG/100ML; MG/100ML; MG/100ML
INJECTION, SOLUTION INTRAVENOUS CONTINUOUS
Status: DISCONTINUED | OUTPATIENT
Start: 2019-09-24 | End: 2019-09-24 | Stop reason: HOSPADM

## 2019-09-24 RX ORDER — FENTANYL CITRATE 50 UG/ML
25-50 INJECTION, SOLUTION INTRAMUSCULAR; INTRAVENOUS
Status: DISCONTINUED | OUTPATIENT
Start: 2019-09-24 | End: 2019-09-24 | Stop reason: HOSPADM

## 2019-09-24 RX ORDER — KETAMINE HYDROCHLORIDE 10 MG/ML
INJECTION, SOLUTION INTRAMUSCULAR; INTRAVENOUS PRN
Status: DISCONTINUED | OUTPATIENT
Start: 2019-09-24 | End: 2019-09-24

## 2019-09-24 RX ORDER — MEPERIDINE HYDROCHLORIDE 25 MG/ML
12.5 INJECTION INTRAMUSCULAR; INTRAVENOUS; SUBCUTANEOUS
Status: DISCONTINUED | OUTPATIENT
Start: 2019-09-24 | End: 2019-09-25 | Stop reason: HOSPADM

## 2019-09-24 RX ORDER — BUPIVACAINE HYDROCHLORIDE 2.5 MG/ML
INJECTION, SOLUTION EPIDURAL; INFILTRATION; INTRACAUDAL PRN
Status: DISCONTINUED | OUTPATIENT
Start: 2019-09-24 | End: 2019-09-24 | Stop reason: HOSPADM

## 2019-09-24 RX ORDER — AMOXICILLIN 250 MG
1-2 CAPSULE ORAL 2 TIMES DAILY
Qty: 30 TABLET | Refills: 0 | Status: SHIPPED | OUTPATIENT
Start: 2019-09-24 | End: 2020-05-18

## 2019-09-24 RX ORDER — FLUMAZENIL 0.1 MG/ML
0.2 INJECTION, SOLUTION INTRAVENOUS
Status: DISCONTINUED | OUTPATIENT
Start: 2019-09-24 | End: 2019-09-24 | Stop reason: HOSPADM

## 2019-09-24 RX ORDER — CEFAZOLIN SODIUM 2 G/50ML
2 SOLUTION INTRAVENOUS
Status: COMPLETED | OUTPATIENT
Start: 2019-09-24 | End: 2019-09-24

## 2019-09-24 RX ORDER — PROPOFOL 10 MG/ML
INJECTION, EMULSION INTRAVENOUS CONTINUOUS PRN
Status: DISCONTINUED | OUTPATIENT
Start: 2019-09-24 | End: 2019-09-24

## 2019-09-24 RX ORDER — HYDROMORPHONE HYDROCHLORIDE 1 MG/ML
.3-.5 INJECTION, SOLUTION INTRAMUSCULAR; INTRAVENOUS; SUBCUTANEOUS EVERY 10 MIN PRN
Status: DISCONTINUED | OUTPATIENT
Start: 2019-09-24 | End: 2019-09-25 | Stop reason: HOSPADM

## 2019-09-24 RX ORDER — SODIUM CHLORIDE, SODIUM LACTATE, POTASSIUM CHLORIDE, CALCIUM CHLORIDE 600; 310; 30; 20 MG/100ML; MG/100ML; MG/100ML; MG/100ML
INJECTION, SOLUTION INTRAVENOUS CONTINUOUS PRN
Status: DISCONTINUED | OUTPATIENT
Start: 2019-09-24 | End: 2019-09-24

## 2019-09-24 RX ORDER — LIDOCAINE HYDROCHLORIDE 20 MG/ML
INJECTION, SOLUTION INFILTRATION; PERINEURAL PRN
Status: DISCONTINUED | OUTPATIENT
Start: 2019-09-24 | End: 2019-09-24

## 2019-09-24 RX ORDER — ONDANSETRON 4 MG/1
4 TABLET, ORALLY DISINTEGRATING ORAL EVERY 30 MIN PRN
Status: DISCONTINUED | OUTPATIENT
Start: 2019-09-24 | End: 2019-09-25 | Stop reason: HOSPADM

## 2019-09-24 RX ORDER — NALOXONE HYDROCHLORIDE 0.4 MG/ML
.1-.4 INJECTION, SOLUTION INTRAMUSCULAR; INTRAVENOUS; SUBCUTANEOUS
Status: DISCONTINUED | OUTPATIENT
Start: 2019-09-24 | End: 2019-09-25 | Stop reason: HOSPADM

## 2019-09-24 RX ORDER — GABAPENTIN 300 MG/1
300 CAPSULE ORAL ONCE
Status: COMPLETED | OUTPATIENT
Start: 2019-09-24 | End: 2019-09-24

## 2019-09-24 RX ORDER — OXYCODONE HYDROCHLORIDE 5 MG/1
5-10 TABLET ORAL EVERY 4 HOURS PRN
Qty: 60 TABLET | Refills: 0 | Status: SHIPPED | OUTPATIENT
Start: 2019-09-24 | End: 2021-02-26

## 2019-09-24 RX ORDER — IBUPROFEN 600 MG/1
600 TABLET, FILM COATED ORAL EVERY 6 HOURS
Qty: 40 TABLET | Refills: 0 | Status: SHIPPED | OUTPATIENT
Start: 2019-09-24 | End: 2019-10-11

## 2019-09-24 RX ORDER — SODIUM CHLORIDE, SODIUM LACTATE, POTASSIUM CHLORIDE, CALCIUM CHLORIDE 600; 310; 30; 20 MG/100ML; MG/100ML; MG/100ML; MG/100ML
INJECTION, SOLUTION INTRAVENOUS CONTINUOUS
Status: DISCONTINUED | OUTPATIENT
Start: 2019-09-24 | End: 2019-09-25 | Stop reason: HOSPADM

## 2019-09-24 RX ORDER — NALOXONE HYDROCHLORIDE 0.4 MG/ML
.1-.4 INJECTION, SOLUTION INTRAMUSCULAR; INTRAVENOUS; SUBCUTANEOUS
Status: DISCONTINUED | OUTPATIENT
Start: 2019-09-24 | End: 2019-09-24 | Stop reason: HOSPADM

## 2019-09-24 RX ORDER — DEXAMETHASONE SODIUM PHOSPHATE 10 MG/ML
INJECTION, SOLUTION INTRAMUSCULAR; INTRAVENOUS PRN
Status: DISCONTINUED | OUTPATIENT
Start: 2019-09-24 | End: 2019-09-24

## 2019-09-24 RX ORDER — OXYCODONE HYDROCHLORIDE 5 MG/1
5 TABLET ORAL EVERY 4 HOURS PRN
Status: DISCONTINUED | OUTPATIENT
Start: 2019-09-24 | End: 2019-09-25 | Stop reason: HOSPADM

## 2019-09-24 RX ORDER — CEFAZOLIN SODIUM 1 G/50ML
1 SOLUTION INTRAVENOUS SEE ADMIN INSTRUCTIONS
Status: DISCONTINUED | OUTPATIENT
Start: 2019-09-24 | End: 2019-09-24 | Stop reason: HOSPADM

## 2019-09-24 RX ORDER — LIDOCAINE 40 MG/G
CREAM TOPICAL
Status: DISCONTINUED | OUTPATIENT
Start: 2019-09-24 | End: 2019-09-24 | Stop reason: HOSPADM

## 2019-09-24 RX ORDER — GLYCOPYRROLATE 0.2 MG/ML
INJECTION, SOLUTION INTRAMUSCULAR; INTRAVENOUS PRN
Status: DISCONTINUED | OUTPATIENT
Start: 2019-09-24 | End: 2019-09-24

## 2019-09-24 RX ORDER — ACETAMINOPHEN 325 MG/1
975 TABLET ORAL ONCE
Status: COMPLETED | OUTPATIENT
Start: 2019-09-24 | End: 2019-09-24

## 2019-09-24 RX ADMIN — CEFAZOLIN SODIUM 2 G: 2 SOLUTION INTRAVENOUS at 09:56

## 2019-09-24 RX ADMIN — GLYCOPYRROLATE 0.2 MG: 0.2 INJECTION, SOLUTION INTRAMUSCULAR; INTRAVENOUS at 09:52

## 2019-09-24 RX ADMIN — PROPOFOL 30 MG: 10 INJECTION, EMULSION INTRAVENOUS at 09:54

## 2019-09-24 RX ADMIN — LIDOCAINE HYDROCHLORIDE 40 MG: 20 INJECTION, SOLUTION INFILTRATION; PERINEURAL at 09:48

## 2019-09-24 RX ADMIN — DEXAMETHASONE SODIUM PHOSPHATE 10 MG: 10 INJECTION, SOLUTION INTRAMUSCULAR; INTRAVENOUS at 09:52

## 2019-09-24 RX ADMIN — KETAMINE HYDROCHLORIDE 14 MG: 10 INJECTION, SOLUTION INTRAMUSCULAR; INTRAVENOUS at 10:25

## 2019-09-24 RX ADMIN — KETAMINE HYDROCHLORIDE 26 MG: 10 INJECTION, SOLUTION INTRAMUSCULAR; INTRAVENOUS at 09:52

## 2019-09-24 RX ADMIN — ACETAMINOPHEN 975 MG: 325 TABLET ORAL at 07:57

## 2019-09-24 RX ADMIN — ONDANSETRON 4 MG: 2 INJECTION INTRAMUSCULAR; INTRAVENOUS at 09:52

## 2019-09-24 RX ADMIN — PROPOFOL 30 MG: 10 INJECTION, EMULSION INTRAVENOUS at 11:01

## 2019-09-24 RX ADMIN — PROPOFOL 100 MCG/KG/MIN: 10 INJECTION, EMULSION INTRAVENOUS at 09:48

## 2019-09-24 RX ADMIN — PROPOFOL 20 MG: 10 INJECTION, EMULSION INTRAVENOUS at 10:53

## 2019-09-24 RX ADMIN — SODIUM CHLORIDE, SODIUM LACTATE, POTASSIUM CHLORIDE, CALCIUM CHLORIDE: 600; 310; 30; 20 INJECTION, SOLUTION INTRAVENOUS at 09:35

## 2019-09-24 RX ADMIN — KETAMINE HYDROCHLORIDE 10 MG: 10 INJECTION, SOLUTION INTRAMUSCULAR; INTRAVENOUS at 10:45

## 2019-09-24 RX ADMIN — GABAPENTIN 300 MG: 300 CAPSULE ORAL at 08:32

## 2019-09-24 RX ADMIN — SODIUM CHLORIDE, SODIUM LACTATE, POTASSIUM CHLORIDE, CALCIUM CHLORIDE: 600; 310; 30; 20 INJECTION, SOLUTION INTRAVENOUS at 08:30

## 2019-09-24 ASSESSMENT — LIFESTYLE VARIABLES: TOBACCO_USE: 1

## 2019-09-24 ASSESSMENT — MIFFLIN-ST. JEOR: SCORE: 1801.85

## 2019-09-24 NOTE — ANESTHESIA CARE TRANSFER NOTE
Patient: Francisco Thornton    Procedure(s):  Left Arthroscopic Rotator Cuff Repair, Subacromial Decompression, Open Biceps Tenodesis    Diagnosis: Left Rotator Cuff Tear  Diagnosis Additional Information: No value filed.    Anesthesia Type:   MAC, Peripheral Nerve Block, For Post-op pain in coordination with surgeon     Note:  Airway :Room Air  Patient transferred to:Phase II  Comments: VSS and WNL, comfortable, no PONV, report to Nakia RNHandoff Report: Identifed the Patient, Identified the Reponsible Provider, Reviewed the pertinent medical history, Discussed the surgical course, Reviewed Intra-OP anesthesia mangement and issues during anesthesia, Set expectations for post-procedure period and Allowed opportunity for questions and acknowledgement of understanding      Vitals: (Last set prior to Anesthesia Care Transfer)    CRNA VITALS  9/24/2019 1113 - 9/24/2019 1152      9/24/2019             Pulse:  88    SpO2:  91 %    Resp Rate (set):  10                Electronically Signed By: LESTER Thomas CRNA  September 24, 2019  11:52 AM

## 2019-09-24 NOTE — ANESTHESIA PROCEDURE NOTES
Peripheral Nerve Block Procedure Note    Staff:     Anesthesiologist:  Rolf Rushing MD    Referred By:  Yen Spann MD  Location: Pre-op  Procedure Start/Stop TImes:      9/24/2019 8:34 AM     9/24/2019 8:39 AM    patient identified, IV checked, site marked, risks and benefits discussed, informed consent, monitors and equipment checked, pre-op evaluation, at physician/surgeon's request and post-op pain management      Correct Patient: Yes      Correct Position: Yes      Correct Site: Yes      Correct Procedure: Yes      Correct Laterality:  Yes    Site Marked:  Yes  Procedure details:     Procedure:  Interscalene    ASA:  2    Laterality:  Left    Position:  Supine    Sterile Prep: chloraprep, mask and sterile gloves      Needle gauge:  21    Needle length (inches):  4    Ultrasound: Yes      Ultrasound used to identify targeted nerve, plexus, or vascular structure and placed a needle adjacent to it      Permanent Image entered into patiient's record      Abnormal pain on injection: No      Blood Aspirated: No      Paresthesias:  No    Bleeding at site: No      Bolus via:  Needle    Infusion Method:  Single Shot    Complications:  None  Assessment/Narrative:      133 mg exparel given interscalene

## 2019-09-24 NOTE — PROGRESS NOTES
Patient received left side Interscalene nerve block as a part of research study.  Versed 1mg given. Tolerated procedure well.

## 2019-09-24 NOTE — ANESTHESIA POSTPROCEDURE EVALUATION
Anesthesia POST Procedure Evaluation    Patient: Francisco Thornton   MRN:     [id number] Gender:   male   Age:    69 year old :      1950        Preoperative Diagnosis: Left Rotator Cuff Tear   Procedure(s):  Left Arthroscopic Rotator Cuff Repair, Subacromial Decompression, Open Biceps Tenodesis   Postop Comments: No value filed.       Anesthesia Type:  Not documented  MAC, Peripheral Nerve Block, For Post-op pain in coordination with surgeon    Reportable Event: NO     PAIN: Uncomplicated   Sign Out status: Comfortable, Well controlled pain     PONV: No PONV   Sign Out status:  No Nausea or Vomiting     Neuro/Psych: Uneventful perioperative course   Sign Out Status: Preoperative baseline; Age appropriate mentation     Airway/Resp.: Uneventful perioperative course   Sign Out Status: Airway Device present     CV: Uneventful perioperative course   Sign Out status: Appropriate BP and perfusion indices; Appropriate HR/Rhythm     Disposition:   Sign Out in:  PACU  Disposition:  Phase II; Home  Recovery Course: Uneventful  Follow-Up: Not required           Last Anesthesia Record Vitals:  CRNA VITALS  2019 1113 - 2019 1213      2019             Pulse:  88    SpO2:  91 %    Resp Rate (set):  10          Last PACU Vitals:  Vitals Value Taken Time   BP     Temp     Pulse     Resp     SpO2     Temp src     NIBP 186/106 2019 11:42 AM   Pulse 88 2019 11:44 AM   SpO2 91 % 2019 11:44 AM   Resp     Temp     Ht Rate 89 2019 11:38 AM   Temp 2           Electronically Signed By: Rolf Rushing MD, MD, 2019, 12:18 PM

## 2019-09-24 NOTE — DISCHARGE INSTRUCTIONS
"[facility]  Home Care Following Anesthesia  For 24 hours after surgery:  1. Get plenty of rest.  A responsible adult must stay with you for at least 24 hours after you leave the surgery center.  2. Do not drive or use heavy equipment.  If you have weakness or tingling, don't drive or use heavy equipment until this feeling goes away.   3. Do not drink alcohol.   4. Avoid strenuous or risky activities.  Ask for help when climbing stairs.  5. You may feel lightheaded.  IF so, sit for a few minutes before standing.  Have someone help you get up.   6. If you have nausea (feel sick to your stomach): Drink only clear liquids such as apple juice, ginger ale, broth or 7-Up.  Rest may also help.  Be sure to drink enough fluids.  Move to a regular diet as you feel able.   7. You may have a slight fever.  Call the doctor if your fever is over 100 F (37.7 C) (taken under the tongue) or lasts longer than 24 hours.  8. You may have a dry mouth, a sore throat, muscle aches or trouble sleeping. These should go away after 24 hours.  9. Do not make important or legal decisions.        Today you received a Marcaine or bupivacaine block to numb the nerves near your surgery site.  This is a block using local anesthetic or \"numbing\" medication injected around the nerves to anesthetize or \"numb\" the area supplied by those nerves.  This block is injected into the muscle layer near your surgical site.  The medication may numb the location where you had surgery for 6-18 hours, but may last up to 24 hours.  If your surgical site is an arm or leg you should be careful with your affected limb, since it is possible to injure your limb without being aware of it due to the numbing.  Until full feeling returns, you should guard against bumping or hitting your limb, and avoid extreme hot or cold temperatures on the skin.  As the block wears off, the feeling will return as a tingling or prickly sensation near your " surgical site.  You will experience more discomfort from your incision as the feeling returns.  You may want to take a pain pill (a narcotic or Tylenol if this was prescribed by your surgeon) when you start to experience mild pain before the pain beccomes more severe.  If your pain medications do not control your pain you should notifiy your surgeon.    Tips for taking pain medications  To get the best pain relief possible, remember these points:    Take pain medications as directed, before pain becomes severe.    Pain medication can upset your stomach: taking it with food may help.    Constipation is a common side effect of pain medication. Drink plenty of  fluids.    Eat foods high in fiber. Take a stool softener if recommended by your doctor or pharmacist.    Do not drink alcohol, drive or operate machinery while taking pain medications.    Ask about other ways to control pain, such as with heat, ice or relaxation.    Tylenol/Acetaminophen Consumption  To help encourage the safe use of acetaminophen, the makers of TYLENOL  have lowered the maximum daily dose for single-ingredient Extra Strength TYLENOL  (acetaminophen) products sold in the [place] from 8 pills per day (4,000 mg) to 6 pills per day (3,000 mg). The dosing interval has also changed from 2 pills every 4-6 hours to 2 pills every 6 hours.    If you feel your pain relief is insufficient, you may take Tylenol/Acetaminophen in addition to your narcotic pain medication.     Be careful not to exceed 3,000 mg of Tylenol/Acetaminophen in a 24 hour period from all sources.    If you are taking extra strength Tylenol/acetaminophen (500 mg), the maximum dose is 6 tablets in 24 hours.    If you are taking regular strength acetaminophen (325 mg), the maximum dose is 9 tablets in 24 hours.    Call a doctor for any of the followin. Signs of infection (fever, growing tenderness at the surgery site, a large amount of drainage or bleeding, severe pain, foul-smelling  drainage, redness, swelling).  2. It has been over 8 to 10 hours since surgery and you are still not able to urinate (pass water).  3. Headache for over 24 hours.  4. Numbness, tingling or weakness the day after surgery (if you had spinal anesthesia).  Your doctor is:  Dr. Yen Spann, Orthopaedics: [phone]                    Or dial [phone] and ask for the resident on call for:  Orthopaedics  For emergency care, call the:  [facility]:  [phone] (TTY for hearing impaired: [phone])  Post Operative Instructions: Regional Anesthetic for Upper Extremity    General Information:   Regional anesthesia is when local anesthetic or  numbing  medication is injected around the nerves to anesthetize or  numb  the area supplied by that set of nerves.      Types of Regional Blocks:  Interscalene: A block injected into the neck on the operative shoulder/arm of a patient having shoulder surgery  Supraclavicular: A block injected near the clavicle on the operative shoulder of a patient having elbow, forearm, or hand surgery    Procedure:  The type of anesthesia your doctor used to numb your shoulder or arm will usually not wear off for 6-18 hours, but may last as long as 24 hours. You should be careful during that period, since it is possible to injure your arm without being aware of the injury. While your arm is numb, you should:    Avoid striking or bumping your arm    Avoid extreme hot or cold    Diet:  There are no restrictions on your diet. You should drink plenty of fluids.     Discomfort:  You will have a tingling and prickly sensation in your arm as the feeling begins to return. You can also expect some discomfort. The amount of discomfort is unpredictable, but if you have more pain than can be controlled with pain medication you should notify your physician.     Pain Medicine:   Begin taking your oral pain pills (if you have not already done so) before bedtime and during the night  to avoid a sudden onset of pain as the block wears off.  Do not engage in drinking, driving, or hazardous occupations while taking pain medication.     Stitches:   You may have stitches or special skin closures. You doctor will inform you when to return to the office to have them removed.     Activity:  On the day of surgery you should try to stay in bed with your hand elevated on pillows. You may resume your normal activity after that, wearing a sling for comfort. Contact your physician if you have any of the problems:     Continued numbness or tingling in the arm or hand after 24 hours    Swelling of the fingers or fingers that are cold to the touch    Excessive bleeding or drainage    Severe pain

## 2019-09-24 NOTE — PROGRESS NOTES
Patient received left side Interscalene nerve block  with Exparel.  Versed 1mg given. Tolerated procedure well.

## 2019-09-24 NOTE — ANESTHESIA PREPROCEDURE EVALUATION
Anesthesia Pre-Procedure Evaluation    Patient: Francisco Thornton   MRN:     [id number] Gender:   male   Age:    69 year old :      1950        Preoperative Diagnosis: Left Rotator Cuff Tear   Procedure(s):  Left Arthroscopic Rotator Cuff Repair, Subacromial Decompression, Open Biceps Tenodesis     Past Medical History:   Diagnosis Date     Controlled type 2 diabetes mellitus without complication, without long-term current use of insulin (H) 2013     Diabetes (H)      Hyperlipidemia LDL goal <70      Hypertension goal BP (blood pressure) < 140/90      Obesity, unspecified      Prostatitis, unspecified      Sleep apnea     no cpap     Unspecified sinusitis (chronic)       Past Surgical History:   Procedure Laterality Date     APPENDECTOMY       C NONSPECIFIC PROCEDURE      S/P vasectomy          Anesthesia Evaluation     . Pt has had prior anesthetic. Type: General    No history of anesthetic complications          ROS/MED HX    ENT/Pulmonary:     (+)sleep apnea, tobacco use, Past use , . .    Neurologic:  - neg neurologic ROS     Cardiovascular:     (+) hypertension----. : . . . :. .       METS/Exercise Tolerance:  4 - Raking leaves, gardening   Hematologic:  - neg hematologic  ROS       Musculoskeletal:  - neg musculoskeletal ROS       GI/Hepatic:  - neg GI/hepatic ROS       Renal/Genitourinary:  - ROS Renal section negative       Endo:     (+) type II DM Obesity, .      Psychiatric:  - neg psychiatric ROS       Infectious Disease:  - neg infectious disease ROS       Malignancy:      - no malignancy   Other:                         PHYSICAL EXAM:   Mental Status/Neuro: A/A/O   Airway: Facies: Feasible  Mallampati: I  Mouth/Opening: Full  TM distance: > 6 cm  Neck ROM: Full   Respiratory: Auscultation: CTAB     Resp. Rate: Normal     Resp. Effort: Normal      CV: Rhythm: Regular  Rate: Age appropriate  Heart: Normal Sounds  Edema: None   Comments:      Dental: Normal Dentition         "        LABS:  CBC:   Lab Results   Component Value Date    WBC 8.5 09/12/2019    WBC 7.7 07/19/2017    HGB 16.9 09/12/2019    HGB 16.0 07/19/2017    HCT 47.9 09/12/2019    HCT 45.0 07/19/2017     09/12/2019     07/19/2017     BMP:   Lab Results   Component Value Date     09/12/2019     12/01/2018    POTASSIUM 4.2 09/12/2019    POTASSIUM 4.3 12/01/2018    CHLORIDE 106 09/12/2019    CHLORIDE 106 12/01/2018    CO2 22 09/12/2019    CO2 22 12/01/2018    BUN 16 09/12/2019    BUN 19 12/01/2018    CR 0.82 09/12/2019    CR 0.90 12/01/2018     (H) 09/12/2019     (H) 12/01/2018     COAGS: No results found for: PTT, INR, FIBR  POC:   Lab Results   Component Value Date     (H) 09/24/2019     OTHER:   Lab Results   Component Value Date    A1C 7.1 (H) 09/12/2019    ROBYN 10.0 09/12/2019    ALBUMIN 4.4 09/12/2019    PROTTOTAL 7.7 09/12/2019    ALT 62 09/12/2019    AST 28 09/12/2019    ALKPHOS 62 09/12/2019    BILITOTAL 0.7 09/12/2019    TSH 2.56 09/12/2019        Preop Vitals    BP Readings from Last 3 Encounters:   09/24/19 (!) 144/77   09/12/19 124/72   08/23/19 128/66    Pulse Readings from Last 3 Encounters:   09/24/19 68   09/12/19 102   06/11/19 94      Resp Readings from Last 3 Encounters:   09/24/19 16   04/09/19 18   12/30/15 16    SpO2 Readings from Last 3 Encounters:   09/24/19 94%   09/12/19 96%   06/11/19 94%      Temp Readings from Last 1 Encounters:   09/24/19 36.9  C (98.4  F) (Oral)    Ht Readings from Last 1 Encounters:   09/24/19 1.727 m (5' 8\")      Wt Readings from Last 1 Encounters:   09/24/19 105.2 kg (232 lb)    Estimated body mass index is 35.28 kg/m  as calculated from the following:    Height as of this encounter: 1.727 m (5' 8\").    Weight as of this encounter: 105.2 kg (232 lb).     LDA:  Airway - Adult/Peds laryngeal mask airway (Active)   Number of days: 0        Assessment:   ASA SCORE: 2    H&P: History and physical reviewed and following examination; no " interval change.   Smoking Status:  Non-Smoker/Unknown   NPO Status: NPO Appropriate     Plan:   Anes. Type:  MAC; Peripheral Nerve Block; For Post-op pain in coordination with surgeon     Block Details: Single Shot; Exparel; Interscalene   Pre-Medication: None   Induction:  N/a   Airway: Native Airway   Access/Monitoring: PIV   Maintenance: N/a     Postop Plan:   Postop Pain: None  Postop Sedation/Airway: Not planned  Disposition: Outpatient     PONV Management:   Adult Risk Factors:, Non-Smoker   Prevention: Ondansetron, Dexamethasone     CONSENT: Direct conversation   Plan and risks discussed with: Patient                      Rolf Rushing MD, MD

## 2019-10-02 DIAGNOSIS — E11.9 TYPE 2 DIABETES MELLITUS WITHOUT COMPLICATION, WITHOUT LONG-TERM CURRENT USE OF INSULIN (H): ICD-10-CM

## 2019-10-03 RX ORDER — GLIMEPIRIDE 4 MG/1
8 TABLET ORAL
Qty: 180 TABLET | Refills: 1 | Status: SHIPPED | OUTPATIENT
Start: 2019-10-03 | End: 2020-03-09

## 2019-10-03 NOTE — TELEPHONE ENCOUNTER
Requested Prescriptions   Pending Prescriptions Disp Refills     glimepiride (AMARYL) 4 MG tablet [Pharmacy Med Name: GLIMEPIRIDE 4 MG TABLET]        Last Written Prescription Date:  7.15.19  Last Fill Quantity: 180 tablet,  # refills: 0   Last office visit: 9/12/2019 with prescribing provider:  Geovany Perkins MD             Future Office Visit:   Next 5 appointments (look out 90 days)    Oct 11, 2019  1:45 PM CDT  Return Visit with Yen Spann MD  [facility] ([facility]) [address] [address] [address]  [phone]   Nov 08, 2019  4:00 PM CST  Return Visit with Yen Spann MD  [facility] ([facility]) [address] [address] [address]  [phone]            180 tablet 0     Sig: TAKE 2 TABLETS (8 MG) BY MOUTH EVERY MORNING (BEFORE BREAKFAST)       Sulfonylurea Agents Passed - 10/2/2019  8:32 PM        Passed - Blood pressure less than 140/90 in past 6 months     BP Readings from Last 3 Encounters:   09/24/19 128/74   09/12/19 124/72   08/23/19 128/66                 Passed - Patient has documented LDL within the past 12 mos.     Recent Labs   Lab Test 09/12/19  0943   LDL 82             Passed - Patient has had a Microalbumin in the past 15 mos.     Recent Labs   Lab Test 11/13/18  1441   MICROL 5   UMALCR 13.64             Passed - Patient has documented A1c within the specified period of time.     If HgbA1C is 8 or greater, it needs to be on file within the past 3 months.  If less than 8, must be on file within the past 6 months.     Recent Labs   Lab Test 09/12/19  0943   A1C 7.1*             Passed - Medication is active on med list        Passed - Patient is age 18 or older        Passed - Patient has a recent creatinine (normal) within the past 12 mos.     Recent Labs   Lab Test 09/12/19  0943   CR 0.82             Passed - Recent (6 mo) or future (30  "days) visit within the authorizing provider's specialty     Patient had office visit in the last 6 months or has a visit in the next 30 days with authorizing provider or within the authorizing provider's specialty.  See \"Patient Info\" tab in inbasket, or \"Choose Columns\" in Meds & Orders section of the refill encounter.            "

## 2019-10-04 NOTE — OP NOTE
DATE OF PROCEDURE: 9/24/2019     PREOPERATIVE DIAGNOSES:   1. Left rotator cuff tear.   2. Left long head biceps disease.   3. Left subacromial bursitis.     POSTOPERATIVE DIAGNOSES:   1. Left rotator cuff tear  2. Left long head biceps disease.  3. Left subacromial bursitis     PROCEDURES:   1. Left arthroscopic rotator cuff repair.   2. Left arthroscopic glenohumeral debridement  3. Left subacromial bursectomy with bony acromioplasty.   4. Left open biceps tenodesis    STAFF SURGEON: Yen Spann MD   ASSISTANT: Kendall Rios PA-C   The assistance of Kendall Rios was needed for assistance with patient positioning and instrument management in repair of this tear.     ANESTHESIA: General endotracheal anesthesia with supplementary interscalene block.   ESTIMATED BLOOD LOSS: 5 mL.     IMPLANTS: Arthrex 4.75 Bio-SwiveLock x 2, 6.25 x 1..   COMPLICATIONS: None.     BRIEF PATIENT HISTORY: Francisco Thornton is a 69 year old male I have seen in clinic. Please refer to that documentation. He has an MRI which demonstrated a full thickness tear involving the supraspinatus. The patient has had nonoperative management but has not had adequate lasting relief of pain and is at this time having lifestyle limiting symptoms. He wishes at this time to proceed with surgical intervention. We had discussed the risks and benefits of both non-operative and surgical management. We discussed the expected postoperative restrictions. We discussed the risks of surgery including failure of the cuff to heal or risk of retear, risk of being no better or even worse if he  became stiff, infected, had an injury to the nerves or arteries which power the arm or hand or had a reaction to anesthesia. We discussed the postoperative rehabilitation course. The patient wished to proceed with surgery and informed consent was completed.    DESCRIPTION OF PROCEDURE: The patient was identified in the preoperative area and the correct left shoulder was  marked for surgery. The patient was provided an interscalene block by our Anesthesia colleagues and was taken to the operating room where he was surrendered to general endotracheal anesthesia. The patient was moved to the operating table in the beachchair position with all bony prominences well padded. The head was placed in a head odom with the neck in neutral position. The left upper extremity was prepped and draped in the usual sterile fashion. A timeout was held in accordance with hospital policy, confirming correct patient, side, site, procedure and administration of IV antibiotics prior to incision.   I initiated shoulder arthroscopy through a posterior viewing portal. I created an anterior working portal under direct visualization. I performed a diagnostic arthroscopy. There was significant synovitis. The long head of the biceps was intact but extremely thickened.  The subscapularis had some tendinopathy but was intact.  There was a full-thickness supraspinatus tear.  The infraspinatus and teres minor were intact.  There were no loose bodies in the axillary pouch.  The glenoid and humeral head cartilage had minor grade 1 change.  I tenotomized the long head of the biceps and allowed it to retract out of the shoulder. I debrided the stump back to a stable edge. I debrided the anterior, superior, and posterior labrum. I then moved to the subacromial space and performed a thorough subacromial bursectomy. Upon entering the subacromial space, massive and significant bursitis was encountered. I debrided this with a shaver and an ablator. I denuded the undersurface of all soft tissues including the CA ligament. I had exposed the undersurface of the acromion and used a bur to remove the anterior acromial spur.    Next I debrided the greater tuberosity which included only the supra footprint. I placed a medial 4.75 Bio-SwiveLock anchor with Fibertape in the eyelet. I passed the sutures all in mattress fashion. All  sutures were then brought laterally to another 4.75 Bio-SwiveLock anchor. This reapproximated the tendon to the greater tuberosity. I then moved to the open biceps tenodesis.   I made a longitudinal incision near the axilla centered over the inferior edge of the pectoralis. I dissected down to the pec and came underneath the muscle where I identified the long head of the biceps. This tendon was delivered into the wound and starting at the musculotendinous junction I placed a stitch with FiberLoop extending 2 cm down the tendon. I removed the remaining proximal tendon. I then exposed the site for tenodesis beneath the pectoralis below the biceps groove. I placed a melida retractor and long thin rich retractor to safely expose the site. I drilled a 6.5 tunnel and used the 6.25 SwivelLock to deliver the tendon into the tunnel. I anchored the SwivelLock with good purchase and appropriate tension on the tendon. The sutures were cut flush.   All instruments were removed from the shoulder and then portals were closed with interrupted 3-0 Monocryl. Steri-Strips and a soft sterile dressing were applied. The arm was placed into an abduction sling and the patient was extubated and transported to the recovery room in stable condition. There were no apparent intraoperative complications.     POSTOPERATIVE PLAN:   1. The patient will be discharged to home when he meets Same Day discharge criteria.   2. The patient will have no range of motion of the shoulder for 2-4 weeks and can do active hand, wrist and elbow range of motion.   3. At 2 weeks, the patient will start pendulums and passive ER at side. At 4 weeks, he will progress to unrestricted passive and then active assisted range of motion and progress to active range of motion over the following 6 weeks with strengthening at 3 months.     VAISHNVAI GIRON MD

## 2019-10-11 ENCOUNTER — OFFICE VISIT (OUTPATIENT)
Dept: ORTHOPEDICS | Facility: CLINIC | Age: 69
End: 2019-10-11
Payer: COMMERCIAL

## 2019-10-11 DIAGNOSIS — M75.122 COMPLETE TEAR OF LEFT ROTATOR CUFF, UNSPECIFIED WHETHER TRAUMATIC: ICD-10-CM

## 2019-10-11 DIAGNOSIS — Z98.890 S/P ROTATOR CUFF REPAIR: Primary | ICD-10-CM

## 2019-10-11 PROCEDURE — 99024 POSTOP FOLLOW-UP VISIT: CPT | Performed by: ORTHOPAEDIC SURGERY

## 2019-10-11 RX ORDER — IBUPROFEN 600 MG/1
600 TABLET, FILM COATED ORAL EVERY 6 HOURS
Qty: 40 TABLET | Refills: 0 | Status: SHIPPED | OUTPATIENT
Start: 2019-10-11 | End: 2020-08-04

## 2019-10-11 NOTE — PROGRESS NOTES
CHIEF COMPLAINT: Status post left arthroscopic rotator cuff repair, glenohumeral debridement, subacromial bursectomy with bony acromioplasty, and left open biceps tenodesis.   DATE OF SURGERY: 9/24/19    HISTORY OF PRESENT ILLNESS: Mr. Thornton is an extremely pleasant male year-old who is 2 weeks and 3 days status post the above procedure.  Patient is here today, outside of the sling (does not bring . He states that he notes that his ROM is comparable to prior to surgery. Mild pain noted at the anterior upper arm, and along the posterior aspect of the upper arm.  He states that he does not push himself through his ROM. Patient sleeping well, was sleeping in the recliner until this past Wednesday. He is now sleeping in bed, and wakes only to use the restroom.    Current pain level: 2/10   Current treatment: Tylenol, and Ibuprofen, icing the shoulder. Patient is concerned that he might need more pain pills for after Physical Therapy.     EXAM:  Pleasant adult male in no distress.  Respirations even and unlabored.  Left upper extremity: Incisions clean, dry, and intact. No erythema. No drainage. Sens intact Ax/Musc/Med/Rad/Uln nerves. Motor intact EPL, FPL, and Intrinsics. Shoulder range of motion not tested due to postop restrictions.    ASSESSMENT:  1. Two weeks status post above procedure    PLAN:    Arthroscopic images and intra-operative findings reviewed    Range of Motion:     Hand, wrist and elbow ROM    Shoulder range of motion to begin next week with PT (Radha)    Sling: On at all times except for bathing or dressing or PT. Reinforced this with patient    Pain medication: Reviewed. Discussed plan to wean from narcotics. NSAIDs OK.     Follow up: 4 weeks with no new radiographs needed.

## 2019-10-11 NOTE — LETTER
10/11/2019         RE: Francisco Thornton  [address] [facility] [address]        Dear Colleague,    Thank you for referring your patient, Francisco Thornton, to the [facility]. Please see a copy of my visit note below.    CHIEF COMPLAINT: Status post left arthroscopic rotator cuff repair, glenohumeral debridement, subacromial bursectomy with bony acromioplasty, and left open biceps tenodesis.   DATE OF SURGERY: 9/24/19    HISTORY OF PRESENT ILLNESS: Mr. Thornton is an extremely pleasant male year-old who is 2 weeks and 3 days status post the above procedure.  Patient is here today, outside of the sling (does not bring . He states that he notes that his ROM is comparable to prior to surgery. Mild pain noted at the anterior upper arm, and along the posterior aspect of the upper arm.  He states that he does not push himself through his ROM. Patient sleeping well, was sleeping in the recliner until this past Wednesday. He is now sleeping in bed, and wakes only to use the restroom.    Current pain level: 2/10   Current treatment: Tylenol, and Ibuprofen, icing the shoulder. Patient is concerned that he might need more pain pills for after Physical Therapy.     EXAM:  Pleasant adult male in no distress.  Respirations even and unlabored.  Left upper extremity: Incisions clean, dry, and intact. No erythema. No drainage. Sens intact Ax/Musc/Med/Rad/Uln nerves. Motor intact EPL, FPL, and Intrinsics. Shoulder range of motion not tested due to postop restrictions.    ASSESSMENT:  1. Two weeks status post above procedure    PLAN:    Arthroscopic images and intra-operative findings reviewed    Range of Motion:     Hand, wrist and elbow ROM    Shoulder range of motion to begin next week with PT (Radha)    Sling: On at all times except for bathing or dressing or PT. Reinforced this with patient    Pain medication: Reviewed. Discussed plan to wean from narcotics. NSAIDs OK.     Follow up: 4  weeks with no new radiographs needed.           Again, thank you for allowing me to participate in the care of your patient.        Sincerely,        Yen Spann MD

## 2019-10-25 ENCOUNTER — THERAPY VISIT (OUTPATIENT)
Dept: PHYSICAL THERAPY | Facility: CLINIC | Age: 69
End: 2019-10-25
Payer: COMMERCIAL

## 2019-10-25 DIAGNOSIS — M25.512 LEFT SHOULDER PAIN: Primary | ICD-10-CM

## 2019-10-25 DIAGNOSIS — Z98.890 S/P ROTATOR CUFF REPAIR: ICD-10-CM

## 2019-10-25 DIAGNOSIS — M75.122 COMPLETE TEAR OF LEFT ROTATOR CUFF, UNSPECIFIED WHETHER TRAUMATIC: ICD-10-CM

## 2019-10-25 PROCEDURE — 97110 THERAPEUTIC EXERCISES: CPT | Mod: GP | Performed by: PHYSICAL THERAPIST

## 2019-10-25 PROCEDURE — 97161 PT EVAL LOW COMPLEX 20 MIN: CPT | Mod: GP | Performed by: PHYSICAL THERAPIST

## 2019-10-25 NOTE — PROGRESS NOTES
[facility] Initial Evaluation  Subjective:  Physical Therapy Initial Evaluation   10/25/19   Precautions/Restrictions/MD instructions: PT eval and treat (Dr. Spann, [facility])   Therapist Impression:   Pt is a 70 y/o male, with chronic history of left shoulder pain;  4 wks s/p RCR and biceps tenodesis. Pt presents with pain, decreased ROM/mobility, poor posture and decreased strength. These impairments limit their ability to reach, lift and sleep. Skilled PT services necessary in order to reduce impairments and improve independent function.    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 Spann  Red Flags: Diabetes Type 11      HPI                    Objective:  SHOULDER:    Standing Posture: Not wearing sling (but MD aware); but pt notes being very careful with left shoulder      Shoulder: (* indicates patient's pain)   PROM L PROM R   Flex 60 150   Abd 50 150   IR  40   ER 5 60   Ext/IR       incision: Well-healed       System    Physical Exam    General     ROS    Assessment/Plan:    Patient is a 69 year old male with left side shoulder complaints.    Patient has the following significant findings with  corresponding treatment plan.                Diagnosis 1:  L shoulder pain  Pain -  hot/cold therapy, manual therapy, self management, education and home program  Decreased ROM/flexibility - manual therapy and therapeutic exercise  Decreased joint mobility - manual therapy and therapeutic exercise  Decreased strength - therapeutic exercise and therapeutic activities  Inflammation - cold therapy and self management/home program  Impaired muscle performance - neuro re-education  Decreased function - therapeutic activities  Impaired posture - neuro re-education  Instability -  Therapeutic Activity  Therapeutic Exercise    Therapy Evaluation Codes:     Cumulative Therapy Evaluation is: Low complexity.    Previous and current functional limitations:  (See Goal Flow Sheet for this information)    Short term and Long term goals: (See Goal Flow Sheet for this information)     Communication ability:  Patient appears to be able to clearly communicate and understand verbal and written communication and follow directions correctly.  Treatment Explanation - The following has been discussed with the patient:   RX ordered/plan of care  Anticipated outcomes  Possible risks and side effects  This patient would benefit from PT intervention to resume normal activities.   Rehab potential is good.    Frequency:  1 X week, once daily  Duration:  for 6 weeks; tappering to 2X/mo for 3 mo  Discharge Plan:  Achieve all LTG.  Independent in home treatment program.  Reach maximal therapeutic benefit.      Please refer to the daily flowsheet for treatment today, total treatment time and time spent performing 1:1 timed codes.

## 2019-11-04 ENCOUNTER — THERAPY VISIT (OUTPATIENT)
Dept: PHYSICAL THERAPY | Facility: CLINIC | Age: 69
End: 2019-11-04
Payer: COMMERCIAL

## 2019-11-04 DIAGNOSIS — M25.512 LEFT SHOULDER PAIN: ICD-10-CM

## 2019-11-04 DIAGNOSIS — Z98.890 S/P ROTATOR CUFF REPAIR: Primary | ICD-10-CM

## 2019-11-04 PROCEDURE — 97110 THERAPEUTIC EXERCISES: CPT | Mod: GP | Performed by: PHYSICAL THERAPIST

## 2019-11-08 ENCOUNTER — OFFICE VISIT (OUTPATIENT)
Dept: ORTHOPEDICS | Facility: CLINIC | Age: 69
End: 2019-11-08
Payer: COMMERCIAL

## 2019-11-08 DIAGNOSIS — M75.122 COMPLETE TEAR OF LEFT ROTATOR CUFF, UNSPECIFIED WHETHER TRAUMATIC: Primary | ICD-10-CM

## 2019-11-08 PROCEDURE — 99024 POSTOP FOLLOW-UP VISIT: CPT | Performed by: ORTHOPAEDIC SURGERY

## 2019-11-08 NOTE — LETTER
11/8/2019         RE: Francisco Thornton  [address] [place] [address]        Dear Colleague,    Thank you for referring your patient, Francisco Thornton, to the [facility] ORTHOPEDIC SURGERY. Please see a copy of my visit note below.    CHIEF COMPLAINT: Status post left arthroscopic rotator cuff repair, glenohumeral debridement, subacromial bursectomy with bony acromioplasty, and left open biceps tenodesis.   DATE OF SURGERY: 9/24/19    HISTORY OF PRESENT ILLNESS: Mr. Thornton is an extremely pleasant 69 year-old male who is 6 weeks status post the above procedure.  Patient states he has been compliant with lifting restrictions, he reports his range of motion is progressing. He is not pushing his end range of motion, as advised by Physical Therapist, Radha. Patient states that his is able to sleep OK on his left side with propping the arm with pillows.   Current Pain Level: 2/10  Current treatment: Ibuprofen as needed, Physical Therapy with HEP, icing     EXAM:  Pleasant adult male in no distress.  Respirations even and unlabored.  Left upper extremity: Sens intact Ax/Musc/Med/Rad/Uln nerves. Motor intact EPL, FPL, and Intrinsics. Shoulder range of motion is assisted FE to 110 and ER to 40    ASSESSMENT:  1. Six weeks status post above procedure    PLAN:    Range of Motion: Progress ROM of the shoulder with physical therapy per operative note.     Sling: Wean from sling at this time.     Pain medication: NSAIDs and Tylenol PRN    Work: Return to work reviewed and note provided as needed    Follow up: 6 weeks with no new radiographs needed. Plan to advance to strengthening at 3 months pending clinical follow up.           Again, thank you for allowing me to participate in the care of your patient.        Sincerely,        Yen Spann MD

## 2019-11-08 NOTE — PROGRESS NOTES
CHIEF COMPLAINT: Status post left arthroscopic rotator cuff repair, glenohumeral debridement, subacromial bursectomy with bony acromioplasty, and left open biceps tenodesis.   DATE OF SURGERY: 9/24/19    HISTORY OF PRESENT ILLNESS: Mr. Thornton is an extremely pleasant 69 year-old male who is 6 weeks status post the above procedure.  Patient states he has been compliant with lifting restrictions, he reports his range of motion is progressing. He is not pushing his end range of motion, as advised by Physical Therapist, Radha. Patient states that his is able to sleep OK on his left side with propping the arm with pillows.   Current Pain Level: 2/10  Current treatment: Ibuprofen as needed, Physical Therapy with HEP, icing     EXAM:  Pleasant adult male in no distress.  Respirations even and unlabored.  Left upper extremity: Sens intact Ax/Musc/Med/Rad/Uln nerves. Motor intact EPL, FPL, and Intrinsics. Shoulder range of motion is assisted FE to 110 and ER to 40    ASSESSMENT:  1. Six weeks status post above procedure    PLAN:    Range of Motion: Progress ROM of the shoulder with physical therapy per operative note.     Sling: Wean from sling at this time.     Pain medication: NSAIDs and Tylenol PRN    Work: Return to work reviewed and note provided as needed    Follow up: 6 weeks with no new radiographs needed. Plan to advance to strengthening at 3 months pending clinical follow up.

## 2019-11-11 ENCOUNTER — THERAPY VISIT (OUTPATIENT)
Dept: PHYSICAL THERAPY | Facility: CLINIC | Age: 69
End: 2019-11-11
Payer: COMMERCIAL

## 2019-11-11 DIAGNOSIS — M25.512 LEFT SHOULDER PAIN: Primary | ICD-10-CM

## 2019-11-11 PROCEDURE — 97110 THERAPEUTIC EXERCISES: CPT | Mod: GP | Performed by: PHYSICAL THERAPIST

## 2019-11-18 ENCOUNTER — THERAPY VISIT (OUTPATIENT)
Dept: PHYSICAL THERAPY | Facility: CLINIC | Age: 69
End: 2019-11-18
Payer: COMMERCIAL

## 2019-11-18 DIAGNOSIS — M25.512 LEFT SHOULDER PAIN: ICD-10-CM

## 2019-11-18 DIAGNOSIS — Z98.890 S/P ROTATOR CUFF REPAIR: Primary | ICD-10-CM

## 2019-11-18 PROCEDURE — 97110 THERAPEUTIC EXERCISES: CPT | Mod: GP | Performed by: PHYSICAL THERAPIST

## 2019-11-20 DIAGNOSIS — I10 ESSENTIAL HYPERTENSION WITH GOAL BLOOD PRESSURE LESS THAN 140/90: ICD-10-CM

## 2019-11-20 RX ORDER — LISINOPRIL 10 MG/1
TABLET ORAL
Qty: 90 TABLET | Refills: 1 | Status: SHIPPED | OUTPATIENT
Start: 2019-11-20 | End: 2020-05-18

## 2019-11-20 NOTE — TELEPHONE ENCOUNTER
"Requested Prescriptions   Pending Prescriptions Disp Refills     lisinopril (PRINIVIL/ZESTRIL) 10 MG tablet [Pharmacy Med Name: LISINOPRIL 10 MG TABLET]        Last Written Prescription Date:  11.13.18  Last Fill Quantity: 90 tablet,  # refills: 1   Last office visit: 9/12/2019 with prescribing provider:  Geovany Perkins MD           Future Office Visit:   Next 5 appointments (look out 90 days)    Dec 27, 2019  4:00 PM CST  Return Visit with Yen Spann MD  [facility] ORTHOPEDIC SURGERY ([facility]) [address]  [address]  [facility] [facility] [address]  [phone]            90 tablet 1     Sig: TAKE 1 TABLET BY MOUTH EVERY DAY       ACE Inhibitors (Including Combos) Protocol Passed - 11/20/2019  2:26 AM        Passed - Blood pressure under 140/90 in past 12 months     BP Readings from Last 3 Encounters:   09/24/19 128/74   09/12/19 124/72   08/23/19 128/66                 Passed - Recent (12 mo) or future (30 days) visit within the authorizing provider's specialty     Patient has had an office visit with the authorizing provider or a provider within the authorizing providers department within the previous 12 mos or has a future within next 30 days. See \"Patient Info\" tab in inbasket, or \"Choose Columns\" in Meds & Orders section of the refill encounter.              Passed - Medication is active on med list        Passed - Patient is age 18 or older        Passed - Normal serum creatinine on file in past 12 months     Recent Labs   Lab Test 09/12/19  0943   CR 0.82             Passed - Normal serum potassium on file in past 12 months     Recent Labs   Lab Test 09/12/19  0943   POTASSIUM 4.2             "

## 2019-12-09 ENCOUNTER — THERAPY VISIT (OUTPATIENT)
Dept: PHYSICAL THERAPY | Facility: CLINIC | Age: 69
End: 2019-12-09
Payer: COMMERCIAL

## 2019-12-09 DIAGNOSIS — M25.512 LEFT SHOULDER PAIN: Primary | ICD-10-CM

## 2019-12-09 DIAGNOSIS — Z98.890 S/P ROTATOR CUFF REPAIR: ICD-10-CM

## 2019-12-09 PROCEDURE — 97110 THERAPEUTIC EXERCISES: CPT | Mod: GP | Performed by: PHYSICAL THERAPIST

## 2019-12-18 ENCOUNTER — THERAPY VISIT (OUTPATIENT)
Dept: PHYSICAL THERAPY | Facility: CLINIC | Age: 69
End: 2019-12-18
Payer: COMMERCIAL

## 2019-12-18 DIAGNOSIS — Z47.89 AFTERCARE FOLLOWING SURGERY OF THE MUSCULOSKELETAL SYSTEM: ICD-10-CM

## 2019-12-18 PROCEDURE — 97110 THERAPEUTIC EXERCISES: CPT | Mod: GP | Performed by: PHYSICAL THERAPIST

## 2019-12-19 DIAGNOSIS — E11.9 TYPE 2 DIABETES MELLITUS WITHOUT COMPLICATION, WITHOUT LONG-TERM CURRENT USE OF INSULIN (H): ICD-10-CM

## 2019-12-19 NOTE — TELEPHONE ENCOUNTER
Requested Prescriptions   Pending Prescriptions Disp Refills     metFORMIN (GLUCOPHAGE) 1000 MG tablet [Pharmacy Med Name: METFORMIN  Last Written Prescription Date:  6/28/19  Last Fill Quantity: 180,  # refills: 1   Last Office Visit: 9/12/2019   Future Office Visit:    Next 5 appointments (look out 90 days)    Dec 27, 2019  4:00 PM CST  Return Visit with Yen Spann MD  [facility] ORTHOPEDIC SURGERY ([facility] [place]) [address]  [address]  [place] [facility] [address]  [phone]          HCL 1,000 MG TABLET] 180 tablet 1     Sig: TAKE 1 TABLET (1,000 MG) BY MOUTH 2 TIMES DAILY (WITH MEALS). NEEDS APPT.       Biguanide Agents Passed - 12/19/2019  2:13 AM        Passed - Blood pressure less than 140/90 in past 6 months     BP Readings from Last 3 Encounters:   09/24/19 128/74   09/12/19 124/72   08/23/19 128/66           Passed - Patient has documented LDL within the past 12 mos.     Recent Labs   Lab Test 09/12/19  0943   LDL 82           Passed - Patient has had a Microalbumin in the past 15 mos.     Recent Labs   Lab Test 11/13/18  1441   MICROL 5   UMALCR 13.64           Passed - Patient is age 10 or older        Passed - Patient has documented A1c within the specified period of time.     If HgbA1C is 8 or greater, it needs to be on file within the past 3 months.  If less than 8, must be on file within the past 6 months.     Recent Labs   Lab Test 09/12/19  0943   A1C 7.1*           Passed - Patient's CR is NOT>1.4 OR Patient's EGFR is NOT<45 within past 12 mos.     Recent Labs   Lab Test 09/12/19  0943   GFRESTIMATED >90   GFRESTBLACK >90       Recent Labs   Lab Test 09/12/19  0943   CR 0.82           Passed - Patient does NOT have a diagnosis of CHF.        Passed - Medication is active on med list        Passed - Recent (6 mo) or future (30 days) visit within the authorizing provider's specialty     Patient had office visit in the last 6 months or has a visit in the next  "30 days with authorizing provider or within the authorizing provider's specialty.  See \"Patient Info\" tab in inbasket, or \"Choose Columns\" in Meds & Orders section of the refill encounter.              "

## 2019-12-27 ENCOUNTER — OFFICE VISIT (OUTPATIENT)
Dept: ORTHOPEDICS | Facility: CLINIC | Age: 69
End: 2019-12-27
Payer: COMMERCIAL

## 2019-12-27 DIAGNOSIS — M75.122 COMPLETE TEAR OF LEFT ROTATOR CUFF, UNSPECIFIED WHETHER TRAUMATIC: Primary | ICD-10-CM

## 2019-12-27 PROCEDURE — 99024 POSTOP FOLLOW-UP VISIT: CPT | Performed by: ORTHOPAEDIC SURGERY

## 2019-12-27 NOTE — LETTER
12/27/2019         RE: Francisco Thornton  [address]  [place] [address]        Dear Colleague,    Thank you for referring your patient, Francisco Thornton, to the [facility] ORTHOPEDIC SURGERY. Please see a copy of my visit note below.    CHIEF COMPLAINT: Status post left arthroscopic rotator cuff repair, glenohumeral debridement, subacromial bursectomy with bony acromioplasty, and left open biceps tenodesis.   DATE OF SURGERY: 9/24/19  HISTORY OF PRESENT ILLNESS: Mr. Thornton is an extremely pleasant 69 year-old male who is approximately 3 months status post the above procedure.  He reports he continues to make slow progress with his motion and strength. He states he is afraid he is going to ruin the surgery, but has been using his right arm more at work, which causes him to have increased pain in the left shoulder the following morning. His pain varies in location dependant on what activity he was performing the day prior.  He has been utilizing Ibuprofen 800mg, and ice for the shoulder. He continues with Physical Therapy, and home exercises: wall slides, and pulleys. He also notes using heat for his low back and neck, in addition to Hydrocodone 5-325mg at bedtime which is remaining from previous low back injury. He also notes that he has been experiencing increased neck pain recently with no trauma or injury that he is aware of. Denies radicular pain or numbness/tingling. Reports pain is central in neck.   Current pain level: 0/10, Worst pain level after activity: 6/10     EXAM:   Pleasant adult male in no distress.  Respirations even and unlabored.  Left upper extremity: Motor intact EPL, FPL, and Intrinsics. Shoulder range of motion is active FE to 160, ER at side to 60 and IR to L2    ASSESSMENT:  1. Three months status post above procedure    PLAN:    Activity:     Range of motion as tolerated    Strengthening as tolerated    Activities as tolerated with expectation of some limitations due to  weakness. Plan to progress as symptoms allow.       Work: Activities reviewed and note provided if needed    Follow up: 3 months with no new radiographs needed  Discussed eval for C-spine. He has known L spine disease (see XR 2015) but has not had imaging of C-spine. Reviewed options for eval. He will pursue visit with Dr. Yeo in January when he has clarified his benefits.           Again, thank you for allowing me to participate in the care of your patient.        Sincerely,        Yen Spann MD

## 2019-12-27 NOTE — PROGRESS NOTES
CHIEF COMPLAINT: Status post left arthroscopic rotator cuff repair, glenohumeral debridement, subacromial bursectomy with bony acromioplasty, and left open biceps tenodesis.   DATE OF SURGERY: 9/24/19  HISTORY OF PRESENT ILLNESS: Mr. Thornton is an extremely pleasant 69 year-old male who is approximately 3 months status post the above procedure.  He reports he continues to make slow progress with his motion and strength. He states he is afraid he is going to ruin the surgery, but has been using his right arm more at work, which causes him to have increased pain in the left shoulder the following morning. His pain varies in location dependant on what activity he was performing the day prior.  He has been utilizing Ibuprofen 800mg, and ice for the shoulder. He continues with Physical Therapy, and home exercises: wall slides, and pulleys. He also notes using heat for his low back and neck, in addition to Hydrocodone 5-325mg at bedtime which is remaining from previous low back injury. He also notes that he has been experiencing increased neck pain recently with no trauma or injury that he is aware of. Denies radicular pain or numbness/tingling. Reports pain is central in neck.   Current pain level: 0/10, Worst pain level after activity: 6/10     EXAM:   Pleasant adult male in no distress.  Respirations even and unlabored.  Left upper extremity: Motor intact EPL, FPL, and Intrinsics. Shoulder range of motion is active FE to 160, ER at side to 60 and IR to L2    ASSESSMENT:  1. Three months status post above procedure    PLAN:    Activity:     Range of motion as tolerated    Strengthening as tolerated    Activities as tolerated with expectation of some limitations due to weakness. Plan to progress as symptoms allow.       Work: Activities reviewed and note provided if needed    Follow up: 3 months with no new radiographs needed  Discussed eval for C-spine. He has known L spine disease (see XR 2015) but has not had imaging  of C-spine. Reviewed options for eval. He will pursue visit with Dr. Yeo in January when he has clarified his benefits.

## 2020-01-28 PROBLEM — Z47.89 AFTERCARE FOLLOWING SURGERY OF THE MUSCULOSKELETAL SYSTEM: Status: RESOLVED | Noted: 2019-12-18 | Resolved: 2020-01-28

## 2020-01-28 PROBLEM — M25.512 LEFT SHOULDER PAIN: Status: RESOLVED | Noted: 2019-07-19 | Resolved: 2020-01-28

## 2020-01-28 NOTE — PROGRESS NOTES
Discharge Note    Progress reporting period is from initial evaluation date (please see noted date below) to Dec 18, 2019.  Linked Episodes   Type: Episode: Status: Noted: Resolved: Last update: Updated by:   PHYSICAL THERAPY L shoulder; s/p RCR Active 10/25/2019  12/18/2019  4:16 PM Manny Sadler, PT      Comments:       Francisco failed to follow up and current status is unknown.  Please see information below for last relevant information on current status.  Patient seen for 6 visits.    SUBJECTIVE  Subjective changes noted by patient:  Patient reports increased shoulder/bicep pain this morning after lifting at work yesterday.  He did not have pain at the time of lifting.  Pain level has returned to baseline over the course of today.  Current pain level is 2/10.     Previous pain level was  8/10.   Changes in function:  Yes (See Goal flowsheet attached for changes in current functional level)  Adverse reaction to treatment or activity: None    OBJECTIVE  Changes noted in objective findings: Progressed from isometrics to AROM.  Patient tolerated well.     ASSESSMENT/PLAN  Diagnosis: L shoulder pain; s/p RCR and biceps tenodesis   Updated problem list and treatment plan:   Pain - HEP  Decreased ROM/flexibility - HEP  Decreased function - HEP  Decreased strength - HEP  STG/LTGs have been met or progress has been made towards goals:  Yes, please see goal flowsheet for most current information  Assessment of Progress: current status is unknown.    Last current status:     Self Management Plans:  HEP  I have re-evaluated this patient and find that the nature, scope, duration and intensity of the therapy is appropriate for the medical condition of the patient.  Francisco continues to require the following intervention to meet STG and LTG's:  HEP.    Recommendations:  Discharge with current home program.  Patient to follow up with MD as needed.    Please refer to the daily flowsheet for treatment today, total treatment  time and time spent performing 1:1 timed codes.

## 2020-02-10 ENCOUNTER — HEALTH MAINTENANCE LETTER (OUTPATIENT)
Age: 70
End: 2020-02-10

## 2020-02-27 DIAGNOSIS — E78.5 HYPERLIPIDEMIA LDL GOAL <70: ICD-10-CM

## 2020-02-27 NOTE — TELEPHONE ENCOUNTER
"Requested Prescriptions   Pending Prescriptions Disp Refills     simvastatin (ZOCOR) 40 MG tablet [Pharmacy Med Name: SIMVASTATIN 40 MG TABLET] 90 tablet 2     Sig: TAKE 1 TABLET (40 MG) BY MOUTH AT BEDTIME       Last Written Prescription Date:  6/4/2019  Last Fill Quantity: 90,  # refills: 2   Last office visit: 9/12/2019 with prescribing provider:     Future Office Visit:   Next 5 appointments (look out 90 days)    Mar 13, 2020  4:00 PM CDT  Return Visit with Yen Spann MD  [facility] ORTHOPEDIC SURGERY ([facility] [place]) [address]  [address]  [place] [address]  [phone]               Statins Protocol Passed - 2/27/2020  1:56 AM        Passed - LDL on file in past 12 months     Recent Labs   Lab Test 09/12/19  0943   LDL 82             Passed - No abnormal creatine kinase in past 12 months     No lab results found.             Passed - Recent (12 mo) or future (30 days) visit within the authorizing provider's specialty     Patient has had an office visit with the authorizing provider or a provider within the authorizing providers department within the previous 12 mos or has a future within next 30 days. See \"Patient Info\" tab in inbasket, or \"Choose Columns\" in Meds & Orders section of the refill encounter.              Passed - Medication is active on med list        Passed - Patient is age 18 or older        "

## 2020-02-28 RX ORDER — SIMVASTATIN 40 MG
40 TABLET ORAL AT BEDTIME
Qty: 90 TABLET | Refills: 1 | Status: SHIPPED | OUTPATIENT
Start: 2020-02-28 | End: 2020-05-18

## 2020-02-28 NOTE — TELEPHONE ENCOUNTER
Prescription approved per [facility] Refill Protocol.    Benton Tracy RN   [facility]

## 2020-03-06 DIAGNOSIS — E11.9 TYPE 2 DIABETES MELLITUS WITHOUT COMPLICATION, WITHOUT LONG-TERM CURRENT USE OF INSULIN (H): ICD-10-CM

## 2020-03-06 NOTE — TELEPHONE ENCOUNTER
Requested Prescriptions   Pending Prescriptions Disp Refills     glimepiride (AMARYL) 4 MG tablet [Pharmacy Med Name: GLIMEPIRIDE 4 MG TABLET]          Last Written Prescription Date:  10.3.19  Last Fill Quantity: 180 tablet,  # refills: 1   Last office visit: 9/12/2019 with prescribing provider:  Geovany Perkins MD           Future Office Visit:   Next 5 appointments (look out 90 days)    Mar 13, 2020  4:00 PM CDT  Return Visit with Yen Spann MD  [facility] ORTHOPEDIC SURGERY ([facility] [place]) [address]  [address]  [place] [address]  [phone]            180 tablet 1     Sig: TAKE 2 TABLETS (8 MG) BY MOUTH EVERY MORNING (BEFORE BREAKFAST)       Sulfonylurea Agents Failed - 3/6/2020  1:43 AM        Failed - Patient has had a Microalbumin in the past 15 mos.     Recent Labs   Lab Test 11/13/18  1441   MICROL 5   UMALCR 13.64             Passed - Blood pressure less than 140/90 in past 6 months     BP Readings from Last 3 Encounters:   09/24/19 128/74   09/12/19 124/72   08/23/19 128/66                 Passed - Patient has documented LDL within the past 12 mos.     Recent Labs   Lab Test 09/12/19  0943   LDL 82             Passed - Patient has documented A1c within the specified period of time.     If HgbA1C is 8 or greater, it needs to be on file within the past 3 months.  If less than 8, must be on file within the past 6 months.     Recent Labs   Lab Test 09/12/19  0943   A1C 7.1*             Passed - Medication is active on med list        Passed - Patient is age 18 or older        Passed - Patient has a recent creatinine (normal) within the past 12 mos.     Recent Labs   Lab Test 09/12/19  0943   CR 0.82             Passed - Recent (6 mo) or future (30 days) visit within the authorizing provider's specialty     Patient had office visit in the last 6 months or has a visit in the next 30 days with authorizing provider or within the authorizing provider's specialty.   "See \"Patient Info\" tab in inbasket, or \"Choose Columns\" in Meds & Orders section of the refill encounter.            "

## 2020-03-09 RX ORDER — GLIMEPIRIDE 4 MG/1
8 TABLET ORAL
Qty: 60 TABLET | Refills: 0 | Status: SHIPPED | OUTPATIENT
Start: 2020-03-09 | End: 2020-04-02

## 2020-03-09 NOTE — TELEPHONE ENCOUNTER
Due for an Office visit for further refills, only fill for 30 days       Aundrea Schneider RN, BSN  [facility]

## 2020-03-11 ENCOUNTER — TELEPHONE (OUTPATIENT)
Dept: ORTHOPEDICS | Facility: CLINIC | Age: 70
End: 2020-03-11

## 2020-03-11 NOTE — TELEPHONE ENCOUNTER
Patient returned call.   Appointment rescheduled for 3 pm.   He states that the shoulder is doing OK, he is not doing everything that he should be doing.  He is interested in starting Physical Therapy again with Radha Barclay, LOL[facility].  He states at his last appointment he was worried about cost of Physical Therapy with insurance changes, but would like to restart again.     Kerri James, ATC

## 2020-03-11 NOTE — TELEPHONE ENCOUNTER
Left voicemail for patient to return call to Triage. Provided number for call back.     Attempt to reschedule appointment for earlier time, OK to double book per Dr. Spann, or 1 pm slot available.     Kerri James ATC

## 2020-03-13 ENCOUNTER — OFFICE VISIT (OUTPATIENT)
Dept: ORTHOPEDICS | Facility: CLINIC | Age: 70
End: 2020-03-13
Payer: MEDICARE

## 2020-03-13 VITALS
DIASTOLIC BLOOD PRESSURE: 80 MMHG | HEIGHT: 68 IN | SYSTOLIC BLOOD PRESSURE: 132 MMHG | WEIGHT: 232 LBS | BODY MASS INDEX: 35.16 KG/M2

## 2020-03-13 DIAGNOSIS — Z98.890 S/P ROTATOR CUFF REPAIR: Primary | ICD-10-CM

## 2020-03-13 DIAGNOSIS — E11.9 TYPE 2 DIABETES MELLITUS WITHOUT COMPLICATION, WITHOUT LONG-TERM CURRENT USE OF INSULIN (H): ICD-10-CM

## 2020-03-13 PROCEDURE — 99213 OFFICE O/P EST LOW 20 MIN: CPT | Performed by: ORTHOPAEDIC SURGERY

## 2020-03-13 ASSESSMENT — MIFFLIN-ST. JEOR: SCORE: 1796.85

## 2020-03-13 NOTE — PROGRESS NOTES
CHIEF COMPLAINT: Status post left arthroscopic rotator cuff repair, glenohumeral debridement, subacromial bursectomy with bony acromioplasty, and left open biceps tenodesis.   DATE OF SURGERY: 9/24/19    HISTORY OF PRESENT ILLNESS: Mr. Thornton is an extremely pleasant 70 year-old male who is 6 months status post the above procedure. He states the right shoulder is doing well, he denies pain in the shoulder, but will experiencing pain in his left biceps. He feels most discomfort while at work when lifting and stretching the arm. He comments he has been sleeping better with changing his pillow positions and is experiencing less pain in his neck and back.    He also comments on milder right shoulder pain, and pain in his biceps. Pain is not as advanced as the left shoulder.  Pain is located in the anterior upper arm.   Current Treatments: Home exercises/ range of motion, patient is interested in returning to structured Physical Therapy. Icing left shoulder prn, does not find relief in biceps region.     EXAM:  Pleasant adult male in no distress.  Respirations even and unlabored.  Left upper extremity:  No erythema. No drainage. Sens intact Ax/Musc/Med/Rad/Uln nerves. Motor intact EPL, FPL, and Intrinsics. Shoulder range of motion on the left is FE to 170 and ER to 70    ASSESSMENT:  1. Six months status post above procedure    PLAN:  Demond is doing rather well with his recovery but does feel his strength could be improved with further rehab. I have provided him a referral to return to doing additional PT with a focus on strengthening. He will return to me prn.

## 2020-03-13 NOTE — TELEPHONE ENCOUNTER
"Requested Prescriptions   Pending Prescriptions Disp Refills     metFORMIN (GLUCOPHAGE) 1000 MG tablet [Pharmacy Med Name: METFORMIN HCL 1,000 MG TABLET]        Last Written Prescription Date:  12.19.19  Last Fill Quantity: 180 tablet,  # refills: 0   Last office visit: 9/12/2019 with prescribing provider:  Geovany Perkins MD         Future Office Visit:   Next 5 appointments (look out 90 days)    Mar 13, 2020  3:00 PM CDT  Return Visit with Yen Spann MD  [facility] ORTHOPEDIC SURGERY ([facility]) [address]  [address]  [place] [address]  [phone]            180 tablet 0     Sig: TAKE 1 TABLET (1,000 MG) BY MOUTH 2 TIMES DAILY (WITH MEALS). NEEDS APPT.       Biguanide Agents Failed - 3/13/2020  3:57 AM        Failed - Patient has had a Microalbumin in the past 15 mos.     Recent Labs   Lab Test 11/13/18  1441   MICROL 5   UMALCR 13.64             Failed - Patient has documented A1c within the specified period of time.     If HgbA1C is 8 or greater, it needs to be on file within the past 3 months.  If less than 8, must be on file within the past 6 months.     Recent Labs   Lab Test 09/12/19  0943   A1C 7.1*             Failed - Recent (6 mo) or future (30 days) visit within the authorizing provider's specialty     Patient had office visit in the last 6 months or has a visit in the next 30 days with authorizing provider or within the authorizing provider's specialty.  See \"Patient Info\" tab in inbasket, or \"Choose Columns\" in Meds & Orders section of the refill encounter.            Passed - Blood pressure less than 140/90 in past 6 months     BP Readings from Last 3 Encounters:   09/24/19 128/74   09/12/19 124/72   08/23/19 128/66                 Passed - Patient has documented LDL within the past 12 mos.     Recent Labs   Lab Test 09/12/19  0943   LDL 82             Passed - Patient is age 10 or older        Passed - Patient's CR is NOT>1.4 OR Patient's EGFR is " NOT<45 within past 12 mos.     Recent Labs   Lab Test 09/12/19  0943   GFRESTIMATED >90   GFRESTBLACK >90       Recent Labs   Lab Test 09/12/19  0943   CR 0.82             Passed - Patient does NOT have a diagnosis of CHF.        Passed - Medication is active on med list

## 2020-03-13 NOTE — LETTER
3/13/2020         RE: Francisco Thornton  [address] [address] [address]        Dear Colleague,    Thank you for referring your patient, Francisco Thornton, to the [facility] ORTHOPEDIC SURGERY. Please see a copy of my visit note below.    CHIEF COMPLAINT: Status post left arthroscopic rotator cuff repair, glenohumeral debridement, subacromial bursectomy with bony acromioplasty, and left open biceps tenodesis.   DATE OF SURGERY: 9/24/19    HISTORY OF PRESENT ILLNESS: Mr. Thornton is an extremely pleasant 70 year-old male who is 6 months status post the above procedure. He states the right shoulder is doing well, he denies pain in the shoulder, but will experiencing pain in his left biceps. He feels most discomfort while at work when lifting and stretching the arm. He comments he has been sleeping better with changing his pillow positions and is experiencing less pain in his neck and back.    He also comments on milder right shoulder pain, and pain in his biceps. Pain is not as advanced as the left shoulder.  Pain is located in the anterior upper arm.   Current Treatments: Home exercises/ range of motion, patient is interested in returning to structured Physical Therapy. Icing left shoulder prn, does not find relief in biceps region.     EXAM:  Pleasant adult male in no distress.  Respirations even and unlabored.  Left upper extremity:  No erythema. No drainage. Sens intact Ax/Musc/Med/Rad/Uln nerves. Motor intact EPL, FPL, and Intrinsics. Shoulder range of motion on the left is FE to 170 and ER to 70    ASSESSMENT:  1. Six months status post above procedure    PLAN:  Demond is doing rather well with his recovery but does feel his strength could be improved with further rehab. I have provided him a referral to return to doing additional PT with a focus on strengthening. He will return to me prn.           Again, thank you for allowing me to participate in the care of your patient.         Sincerely,        Yen Spann MD

## 2020-04-02 DIAGNOSIS — E11.9 TYPE 2 DIABETES MELLITUS WITHOUT COMPLICATION, WITHOUT LONG-TERM CURRENT USE OF INSULIN (H): ICD-10-CM

## 2020-04-02 RX ORDER — GLIMEPIRIDE 4 MG/1
8 TABLET ORAL
Qty: 60 TABLET | Refills: 0 | Status: SHIPPED | OUTPATIENT
Start: 2020-04-02 | End: 2020-04-24

## 2020-04-24 DIAGNOSIS — E11.9 TYPE 2 DIABETES MELLITUS WITHOUT COMPLICATION, WITHOUT LONG-TERM CURRENT USE OF INSULIN (H): ICD-10-CM

## 2020-04-24 RX ORDER — GLIMEPIRIDE 4 MG/1
8 TABLET ORAL
Qty: 60 TABLET | Refills: 0 | Status: SHIPPED | OUTPATIENT
Start: 2020-04-24 | End: 2020-05-18

## 2020-04-24 NOTE — TELEPHONE ENCOUNTER
Routing refill request to provider for review/approval because:  Labs not current:  A1C  Patient needs to be seen because:  Due for DM check    Do you want VV? Labs?      Ailyn Lopez RN  [facility]

## 2020-04-24 NOTE — LETTER
[address] [address]  [phone]       May 4, 2020    Francisco Thornton  [address] [address] [address]        Demond,    We have been calling you regarding a recent refill request we received for Glimepiride.  Unfortunately, we were unable to reach you.  We are notifying you that you are due for a video visit follow up prior to your next refill.  You can schedule this appointment via [employer] or by calling the clinic at [phone].        Sincerely,          Dinh Perkins M.D.

## 2020-05-18 ENCOUNTER — VIRTUAL VISIT (OUTPATIENT)
Dept: FAMILY MEDICINE | Facility: CLINIC | Age: 70
End: 2020-05-18
Payer: MEDICARE

## 2020-05-18 DIAGNOSIS — I10 ESSENTIAL HYPERTENSION WITH GOAL BLOOD PRESSURE LESS THAN 140/90: ICD-10-CM

## 2020-05-18 DIAGNOSIS — E78.5 HYPERLIPIDEMIA LDL GOAL <70: ICD-10-CM

## 2020-05-18 DIAGNOSIS — E11.9 TYPE 2 DIABETES MELLITUS WITHOUT COMPLICATION, WITHOUT LONG-TERM CURRENT USE OF INSULIN (H): ICD-10-CM

## 2020-05-18 PROCEDURE — 99441 ZZC PHYSICIAN TELEPHONE EVALUATION 5-10 MIN GOVT: CPT | Performed by: FAMILY MEDICINE

## 2020-05-18 RX ORDER — LISINOPRIL 10 MG/1
10 TABLET ORAL DAILY
Qty: 90 TABLET | Refills: 1 | Status: SHIPPED | OUTPATIENT
Start: 2020-05-18 | End: 2020-08-03

## 2020-05-18 RX ORDER — GLIMEPIRIDE 4 MG/1
8 TABLET ORAL
Qty: 180 TABLET | Refills: 0 | Status: SHIPPED | OUTPATIENT
Start: 2020-05-18 | End: 2020-08-03

## 2020-05-18 RX ORDER — SIMVASTATIN 40 MG
40 TABLET ORAL AT BEDTIME
Qty: 90 TABLET | Refills: 1 | Status: SHIPPED | OUTPATIENT
Start: 2020-05-18 | End: 2020-08-03

## 2020-05-18 NOTE — PROGRESS NOTES
"Subjective   Francisco Thornton is a 70 year old male who is being evaluated via a billable telephone visit.      The patient has been notified of following:     \"This telephone visit will be conducted via a call between you and your physician/provider. We have found that certain health care needs can be provided without the need for a physical exam.  This service lets us provide the care you need with a short phone conversation.  If a prescription is necessary we can send it directly to your pharmacy.  If lab work is needed we can place an order for that and you can then stop by our lab to have the test done at a later time.    Telephone visits are billed at different rates depending on your insurance coverage. During this emergency period, for some insurers they may be billed the same as an in-person visit.  Please reach out to your insurance provider with any questions.    If during the course of the call the physician/provider feels a telephone visit is not appropriate, you will not be charged for this service.\"     Patient has given verbal consent for Telephone visit?  Yes    How would you like to obtain your AVS? MyChart      Francisco Thornton is a 70 year old male who presents today for the following health issues:    Chief Complaint  Patient presents with:  Recheck Medication       Diabetes Follow-up    How often are you checking your blood sugar? Two times daily  Blood sugar testing frequency justification:  To know where he is at  What time of day are you checking your blood sugars (select all that apply)?  Before meals and after dinner  Have you had any blood sugars above 200?  Yes periodically - knows why/what he ate  Have you had any blood sugars below 70?  No    What symptoms do you notice when your blood sugar is low?  None - was dizzy all weekend - believed he was dehydrated    What concerns do you have today about your diabetes? None     Do you have any of these symptoms? (Select all that apply)  " Numbness in feet and Burning in feet    Have you had a diabetic eye exam in the last 12 months? No - has appt 05/20/2020        BP Readings from Last 2 Encounters:   03/13/20 132/80   09/24/19 128/74     Hemoglobin A1C (%)   Date Value   09/12/2019 7.1 (H)   06/11/2019 7.9 (H)     LDL Cholesterol Calculated (mg/dL)   Date Value   09/12/2019 82   12/01/2018 95                 Hyperlipidemia Follow-Up      Are you regularly taking any medication or supplement to lower your cholesterol?   Yes- Simvastatin    Are you having muscle aches or other side effects that you think could be caused by your cholesterol lowering medication?  No    Hypertension Follow-up      Do you check your blood pressure regularly outside of the clinic? No     Are you following a low salt diet? No    Are your blood pressures ever more than 140 on the top number (systolic) OR more   than 90 on the bottom number (diastolic), for example 140/90? No      How many servings of fruits and vegetables do you eat daily?  0-1    On average, how many sweetened beverages do you drink each day (Examples: soda, juice, sweet tea, etc.  Do NOT count diet or artificially sweetened beverages)?   0    How many days per week do you exercise enough to make your heart beat faster? 0    How many minutes a day do you exercise enough to make your heart beat faster? 0    How many days per week do you miss taking your medication? 1x per week -- in the afternoon      Reviewed and updated as needed this visit by Provider  Tobacco  Allergies  Meds  Problems  Med Hx  Surg Hx  Fam Hx         ROS: Constitutional, HEENT, cardiovascular, pulmonary, GI, , musculoskeletal, neuro, skin, endocrine and psych systems are negative, except as otherwise noted.       Objective   Reported vitals:  There were no vitals taken for this visit.   Gen: healthy, alert, and no distress  Psych: Alert and oriented times 3; coherent speech, normal   rate and volume, able to articulate logical  thoughts, able   to abstract reason, no tangential thoughts, no hallucinations   or delusions.  His affect is normal.    Diagnostic Test Results:  Labs reviewed in Epic    Lab Results   Component Value Date    A1C 7.1 09/12/2019    A1C 7.9 06/11/2019    A1C 7.1 12/01/2018    A1C 7.5 02/12/2018    A1C 7.4 10/25/2017             Assessment/Plan:  Francisco was seen today for recheck medication.    Diagnoses and all orders for this visit:    Type 2 diabetes mellitus without complication, without long-term current use of insulin (H) - - controlled - continue medication.   -     glimepiride (AMARYL) 4 MG tablet; Take 2 tablets (8 mg) by mouth every morning (before breakfast)  -     metFORMIN (GLUCOPHAGE) 1000 MG tablet; Take 1 tablet (1,000 mg) by mouth 2 times daily (with meals)    Essential hypertension with goal blood pressure less than 140/90 - controlled - continue medication.   -     lisinopril (ZESTRIL) 10 MG tablet; Take 1 tablet (10 mg) by mouth daily    Hyperlipidemia LDL goal <70 - controlled - continue medication.   -     simvastatin (ZOCOR) 40 MG tablet; Take 1 tablet (40 mg) by mouth At Bedtime        Return in about 3 months (around 8/18/2020) for recheck.    Telephone visit Stop Time: 5:42 PM  Phone call duration:  6 minutes        Dinh Perkins MD     [address] [address]  [email or web address]   Office: [phone]  Pager: [phone]

## 2020-06-10 ENCOUNTER — TRANSFERRED RECORDS (OUTPATIENT)
Dept: HEALTH INFORMATION MANAGEMENT | Facility: CLINIC | Age: 70
End: 2020-06-10

## 2020-08-01 ENCOUNTER — TRANSFERRED RECORDS (OUTPATIENT)
Dept: HEALTH INFORMATION MANAGEMENT | Facility: CLINIC | Age: 70
End: 2020-08-01

## 2020-08-01 LAB — RETINOPATHY: NORMAL

## 2020-08-04 ENCOUNTER — OFFICE VISIT (OUTPATIENT)
Dept: FAMILY MEDICINE | Facility: CLINIC | Age: 70
End: 2020-08-04
Payer: MEDICARE

## 2020-08-04 VITALS
TEMPERATURE: 95.9 F | OXYGEN SATURATION: 98 % | HEART RATE: 66 BPM | WEIGHT: 235 LBS | BODY MASS INDEX: 35.61 KG/M2 | SYSTOLIC BLOOD PRESSURE: 130 MMHG | DIASTOLIC BLOOD PRESSURE: 80 MMHG | HEIGHT: 68 IN

## 2020-08-04 DIAGNOSIS — Z13.6 SCREENING FOR AAA (ABDOMINAL AORTIC ANEURYSM): ICD-10-CM

## 2020-08-04 DIAGNOSIS — E11.40 TYPE 2 DIABETES MELLITUS WITH DIABETIC NEUROPATHY, WITHOUT LONG-TERM CURRENT USE OF INSULIN (H): ICD-10-CM

## 2020-08-04 DIAGNOSIS — Z00.00 ENCOUNTER FOR MEDICARE ANNUAL WELLNESS EXAM: Primary | ICD-10-CM

## 2020-08-04 DIAGNOSIS — E11.9 TYPE 2 DIABETES MELLITUS WITHOUT COMPLICATION, WITHOUT LONG-TERM CURRENT USE OF INSULIN (H): ICD-10-CM

## 2020-08-04 DIAGNOSIS — E66.01 MORBID OBESITY (H): ICD-10-CM

## 2020-08-04 DIAGNOSIS — E78.5 HYPERLIPIDEMIA LDL GOAL <70: ICD-10-CM

## 2020-08-04 DIAGNOSIS — Z87.891 HISTORY OF SMOKING: ICD-10-CM

## 2020-08-04 DIAGNOSIS — Z12.5 SCREENING FOR PROSTATE CANCER: ICD-10-CM

## 2020-08-04 DIAGNOSIS — I10 ESSENTIAL HYPERTENSION WITH GOAL BLOOD PRESSURE LESS THAN 140/90: ICD-10-CM

## 2020-08-04 DIAGNOSIS — R25.2 MUSCLE CRAMPS: ICD-10-CM

## 2020-08-04 LAB
ALBUMIN SERPL-MCNC: 3.7 G/DL (ref 3.4–5)
ALP SERPL-CCNC: 76 U/L (ref 40–150)
ALT SERPL W P-5'-P-CCNC: 95 U/L (ref 0–70)
ANION GAP SERPL CALCULATED.3IONS-SCNC: 3 MMOL/L (ref 3–14)
AST SERPL W P-5'-P-CCNC: 44 U/L (ref 0–45)
BILIRUB SERPL-MCNC: 0.5 MG/DL (ref 0.2–1.3)
BUN SERPL-MCNC: 13 MG/DL (ref 7–30)
CALCIUM SERPL-MCNC: 8.7 MG/DL (ref 8.5–10.1)
CHLORIDE SERPL-SCNC: 107 MMOL/L (ref 94–109)
CHOLEST SERPL-MCNC: 158 MG/DL
CO2 SERPL-SCNC: 28 MMOL/L (ref 20–32)
CREAT SERPL-MCNC: 0.84 MG/DL (ref 0.66–1.25)
ERYTHROCYTE [DISTWIDTH] IN BLOOD BY AUTOMATED COUNT: 13 % (ref 10–15)
GFR SERPL CREATININE-BSD FRML MDRD: 90 ML/MIN/{1.73_M2}
GLUCOSE SERPL-MCNC: 218 MG/DL (ref 70–99)
HBA1C MFR BLD: 8.2 % (ref 0–5.6)
HCT VFR BLD AUTO: 44.2 % (ref 40–53)
HDLC SERPL-MCNC: 33 MG/DL
HGB BLD-MCNC: 15.4 G/DL (ref 13.3–17.7)
LDLC SERPL CALC-MCNC: 91 MG/DL
MCH RBC QN AUTO: 31.2 PG (ref 26.5–33)
MCHC RBC AUTO-ENTMCNC: 34.8 G/DL (ref 31.5–36.5)
MCV RBC AUTO: 90 FL (ref 78–100)
NONHDLC SERPL-MCNC: 125 MG/DL
PLATELET # BLD AUTO: 183 10E9/L (ref 150–450)
POTASSIUM SERPL-SCNC: 4.2 MMOL/L (ref 3.4–5.3)
PROT SERPL-MCNC: 7.2 G/DL (ref 6.8–8.8)
PSA SERPL-ACNC: 1.21 UG/L (ref 0–4)
RBC # BLD AUTO: 4.93 10E12/L (ref 4.4–5.9)
SODIUM SERPL-SCNC: 138 MMOL/L (ref 133–144)
TRIGL SERPL-MCNC: 168 MG/DL
WBC # BLD AUTO: 6.3 10E9/L (ref 4–11)

## 2020-08-04 PROCEDURE — 83036 HEMOGLOBIN GLYCOSYLATED A1C: CPT | Performed by: FAMILY MEDICINE

## 2020-08-04 PROCEDURE — 99397 PER PM REEVAL EST PAT 65+ YR: CPT | Mod: GZ | Performed by: FAMILY MEDICINE

## 2020-08-04 PROCEDURE — 36415 COLL VENOUS BLD VENIPUNCTURE: CPT | Performed by: FAMILY MEDICINE

## 2020-08-04 PROCEDURE — 80061 LIPID PANEL: CPT | Performed by: FAMILY MEDICINE

## 2020-08-04 PROCEDURE — 99214 OFFICE O/P EST MOD 30 MIN: CPT | Mod: 25 | Performed by: FAMILY MEDICINE

## 2020-08-04 PROCEDURE — 80053 COMPREHEN METABOLIC PANEL: CPT | Performed by: FAMILY MEDICINE

## 2020-08-04 PROCEDURE — G0103 PSA SCREENING: HCPCS | Performed by: FAMILY MEDICINE

## 2020-08-04 PROCEDURE — 85027 COMPLETE CBC AUTOMATED: CPT | Performed by: FAMILY MEDICINE

## 2020-08-04 RX ORDER — LISINOPRIL 10 MG/1
10 TABLET ORAL DAILY
Qty: 90 TABLET | Refills: 3 | Status: SHIPPED | OUTPATIENT
Start: 2020-08-04 | End: 2021-05-20

## 2020-08-04 RX ORDER — GLIMEPIRIDE 4 MG/1
8 TABLET ORAL
Qty: 180 TABLET | Refills: 1 | Status: SHIPPED | OUTPATIENT
Start: 2020-08-04 | End: 2021-02-24

## 2020-08-04 RX ORDER — SIMVASTATIN 40 MG
40 TABLET ORAL AT BEDTIME
Qty: 90 TABLET | Refills: 3 | Status: SHIPPED | OUTPATIENT
Start: 2020-08-04 | End: 2021-08-18

## 2020-08-04 ASSESSMENT — ACTIVITIES OF DAILY LIVING (ADL): CURRENT_FUNCTION: NO ASSISTANCE NEEDED

## 2020-08-04 ASSESSMENT — MIFFLIN-ST. JEOR: SCORE: 1810.45

## 2020-08-04 NOTE — PATIENT INSTRUCTIONS
Patient Education   Preventive Health Recommendations  See your health care provider every year to    Review health changes.     Discuss preventive care.      Review your medicines if your doctor has prescribed any.    Talk with your health care provider about whether you should have a test to screen for prostate cancer (PSA).    Every 3 years, have a diabetes test (fasting glucose). If you are at risk for diabetes, you should have this test more often.    Every 5 years, have a cholesterol test. Have this test more often if you are at risk for high cholesterol or heart disease.     Every 10 years, have a colonoscopy. Or, have a yearly FIT test (stool test). These exams will check for colon cancer.    Talk to with your health care provider about screening for Abdominal Aortic Aneurysm if you have a family history of AAA or have a history of smoking.    Shots:     Get a flu shot each year.     Get a tetanus shot every 10 years.     Talk to your doctor about your pneumonia vaccines. There are now two you should receive - Pneumovax (PPSV 23) and Prevnar (PCV 13).    Talk to your pharmacist about a shingles vaccine.     Talk to your doctor about the hepatitis B vaccine.    Nutrition:     Eat at least 5 servings of fruits and vegetables each day.     Eat whole-grain bread, whole-wheat pasta and brown rice instead of white grains and rice.     Get adequate Calcium and Vitamin D.     Lifestyle    Exercise for at least 150 minutes a week (30 minutes a day, 5 days a week). This will help you control your weight and prevent disease.     Limit alcohol to one drink per day.     No smoking.     Wear sunscreen to prevent skin cancer.     See your dentist every six months for an exam and cleaning.     See your eye doctor every 1 to 2 years to screen for conditions such as glaucoma, macular degeneration and cataracts.    Personalized Prevention Plan  You are due for the preventive services outlined below.  Your care team is  available to assist you in scheduling these services.  If you have already completed any of these items, please share that information with your care team to update in your medical record.  Health Maintenance Due   Topic Date Due     Colorectal Cancer Screening  01/20/1960     Zoster (Shingles) Vaccine (2 of 3) 09/06/2016     Annual Wellness Visit  07/12/2017     FALL RISK ASSESSMENT  02/12/2019     Eye Exam  04/24/2019     Kidney Microalbumin Urine Test  11/13/2019     Prostate Test  12/01/2019     A1C Lab  03/12/2020     Diabetic Foot Exam  06/11/2020

## 2020-08-04 NOTE — PROGRESS NOTES
SUBJECTIVE:   Francisco Thornton is a 70 year old male who presents for Preventive Visit.    Are you in the first 12 months of your Medicare coverage?  No    Healthy Habits:    In general, how would you rate your overall health?  Fair    Frequency of exercise:  None    Taking medications regularly:  Yes    Barriers to taking medications:  None    Medication side effects:  None    Ability to successfully perform activities of daily living:  No assistance needed    Home Safety:  No safety concerns identified    Hearing Impairment:  No hearing concerns    In the past 6 months, have you been bothered by leaking of urine?  No    In general, how would you rate your overall mental or emotional health?  Good      PHQ-2 Total Score:    Additional concerns today:  Yes    Do you feel safe in your environment? Yes    Have you ever done Advance Care Planning? (For example, a Health Directive, POLST, or a discussion with a medical provider or your loved ones about your wishes): declined      Fall risk  Fallen 2 or more times in the past year?: No  Any fall with injury in the past year?: No    Cognitive Screening   1) Repeat 3 items (Leader, Season, Table)    2) Clock draw: NORMAL  3) 3 item recall: Recalls 3 objects  Results: 3 items recalled: COGNITIVE IMPAIRMENT LESS LIKELY    Mini-CogTM Copyright S Kerry. Licensed by the author for use in [facility]; reprinted with permission (soheather@[email or web address]). All rights reserved.      Do you have sleep apnea, excessive snoring or daytime drowsiness?: no    Reviewed and updated as needed this visit by clinical staff  Tobacco  Allergies  Meds  Problems  Med Hx  Surg Hx  Fam Hx  Soc Hx          Reviewed and updated as needed this visit by Provider  Tobacco  Allergies  Meds  Problems  Med Hx  Surg Hx  Fam Hx        Social History     Tobacco Use     Smoking status: Former Smoker     Packs/day: 1.00     Years: 25.00     Pack years: 25.00     Types: Cigarettes     Last  attempt to quit: 2012     Years since quittin.2     Smokeless tobacco: Never Used     Tobacco comment:  PPD   Substance Use Topics     Alcohol use: No     If you drink alcohol do you typically have >3 drinks per day or >7 drinks per week? No    Alcohol Use 2020   Prescreen: >3 drinks/day or >7 drinks/week? No           Diabetes Follow-up      How often are you checking your blood sugar? Once a day    What concerns do you have today about your diabetes? Blood sugar is often over 200     Do you have any of these symptoms? (Select all that apply)  Burning in feet cramping    Have you had a diabetic eye exam in the last 12 months? Yes- Date of last eye exam: 2 weeks ago sending results,  Location: [facility]        BP Readings from Last 2 Encounters:   20 130/80   20 132/80     Hemoglobin A1C (%)   Date Value   2020 8.2 (H)   2019 7.1 (H)     LDL Cholesterol Calculated (mg/dL)   Date Value   2020 91   2019 82               Hyperlipidemia Follow-Up      Are you regularly taking any medication or supplement to lower your cholesterol?   Yes- simvastatin    Are you having muscle aches or other side effects that you think could be caused by your cholesterol lowering medication?  No    Hypertension Follow-up      Do you check your blood pressure regularly outside of the clinic? No     Are you following a low salt diet? Yes    Are your blood pressures ever more than 140 on the top number (systolic) OR more   than 90 on the bottom number (diastolic), for example 140/90? Does not check      Current providers sharing in care for this patient include:   Patient Care Team:  Geovany Perkins MD as PCP - General (Family Practice)  Geovany Perkins MD as Assigned PCP  Yen Lawson RD as Diabetes Educator (Dietitian, Registered)    The following health maintenance items are reviewed in Epic and correct as of today:  Health Maintenance   Topic Date Due     COLORECTAL CANCER  SCREENING  1962     HEPATITIS B IMMUNIZATION (1 of 3 - Risk 3-dose series) 1971     ZOSTER IMMUNIZATION (2 of 3) 2016     MICROALBUMIN  2019     INFLUENZA VACCINE (1) 2020     A1C  2021     ADVANCE CARE PLANNING  2021     PNEUMOCOCCAL IMMUNIZATION 65+ LOW/MEDIUM RISK (2 of 2 - PPSV23) 2021     EYE EXAM  2021     MEDICARE ANNUAL WELLNESS VISIT  2021     BMP  2021     LIPID  2021     PSA  2021     DIABETIC FOOT EXAM  2021     FALL RISK ASSESSMENT  2021     DTAP/TDAP/TD IMMUNIZATION (3 - Td) 2028     HEPATITIS C SCREENING  Completed     PHQ-2  Completed     AORTIC ANEURYSM SCREENING (SYSTEM ASSIGNED)  Completed     IPV IMMUNIZATION  Aged Out     MENINGITIS IMMUNIZATION  Aged Out       Reviewed and updated as needed this visit by clinical staff  Tobacco  Allergies  Meds  Problems  Med Hx  Surg Hx  Fam Hx  Soc Hx          Reviewed and updated as needed this visit by Provider  Tobacco  Allergies  Meds  Problems  Med Hx  Surg Hx  Fam Hx          Social History     Tobacco Use     Smoking status: Former Smoker     Packs/day: 1.00     Years: 25.00     Pack years: 25.00     Types: Cigarettes     Last attempt to quit: 2012     Years since quittin.2     Smokeless tobacco: Never Used     Tobacco comment:  PPD   Substance Use Topics     Alcohol use: No                           Current providers sharing in care for this patient include:   Patient Care Team:  Geovany Perkins MD as PCP - General (Family Practice)  Geovany Perkins MD as Assigned PCP  Yen Lawson RD as Diabetes Educator (Dietitian, Registered)    The following health maintenance items are reviewed in Epic and correct as of today:  Health Maintenance   Topic Date Due     COLORECTAL CANCER SCREENING  1962     HEPATITIS B IMMUNIZATION (1 of 3 - Risk 3-dose series) 1971     ZOSTER IMMUNIZATION (2 of 3) 2016     MICROALBUMIN  " 11/13/2019     INFLUENZA VACCINE (1) 09/01/2020     A1C  02/04/2021     ADVANCE CARE PLANNING  07/12/2021     PNEUMOCOCCAL IMMUNIZATION 65+ LOW/MEDIUM RISK (2 of 2 - PPSV23) 07/12/2021     EYE EXAM  08/01/2021     MEDICARE ANNUAL WELLNESS VISIT  08/04/2021     BMP  08/04/2021     LIPID  08/04/2021     PSA  08/04/2021     DIABETIC FOOT EXAM  08/04/2021     FALL RISK ASSESSMENT  08/04/2021     DTAP/TDAP/TD IMMUNIZATION (3 - Td) 02/12/2028     HEPATITIS C SCREENING  Completed     PHQ-2  Completed     AORTIC ANEURYSM SCREENING (SYSTEM ASSIGNED)  Completed     IPV IMMUNIZATION  Aged Out     MENINGITIS IMMUNIZATION  Aged Out         ROS:  Constitutional, HEENT, cardiovascular, pulmonary, GI, , musculoskeletal, neuro, skin, endocrine and psych systems are negative, except as otherwise noted.    OBJECTIVE:   /80   Pulse 66   Temp 95.9  F (35.5  C) (Tympanic)   Ht 1.727 m (5' 8\")   Wt 106.6 kg (235 lb)   SpO2 98%   BMI 35.73 kg/m   Estimated body mass index is 35.73 kg/m  as calculated from the following:    Height as of this encounter: 1.727 m (5' 8\").    Weight as of this encounter: 106.6 kg (235 lb).  EXAM:   GENERAL: healthy, alert and no distress  EYES: Eyes grossly normal to inspection, PERRL and conjunctivae and sclerae normal  HENT: ear canals and TM's normal, nose and mouth without ulcers or lesions  NECK: no adenopathy, no asymmetry, masses, or scars and thyroid normal to palpation  RESP: lungs clear to auscultation - no rales, rhonchi or wheezes  BREAST: normal without masses, tenderness or nipple discharge and no palpable axillary masses or adenopathy  CV: regular rate and rhythm, normal S1 S2, no S3 or S4, no murmur, click or rub, no peripheral edema and peripheral pulses strong  ABDOMEN: soft, nontender, no hepatosplenomegaly, no masses and bowel sounds normal   (male): normal male genitalia without lesions or urethral discharge, no hernia  RECTAL: normal sphincter tone, no rectal masses, " prostate normal size, smooth, nontender without nodules or masses  MS: no gross musculoskeletal defects noted, no edema  SKIN: no suspicious lesions or rashes  NEURO: Normal strength and tone, mentation intact and speech normal  PSYCH: mentation appears normal, affect normal/bright  LYMPH: no cervical, supraclavicular, axillary, or inguinal adenopathy  Diabetic foot exam: normal DP and PT pulses, no trophic changes or ulcerative lesions, and reduced sensation at both forefeet and toes  Results for orders placed or performed in visit on 08/04/20   CBC with platelets     Status: None   Result Value Ref Range    WBC 6.3 4.0 - 11.0 10e9/L    RBC Count 4.93 4.4 - 5.9 10e12/L    Hemoglobin 15.4 13.3 - 17.7 g/dL    Hematocrit 44.2 40.0 - 53.0 %    MCV 90 78 - 100 fl    MCH 31.2 26.5 - 33.0 pg    MCHC 34.8 31.5 - 36.5 g/dL    RDW 13.0 10.0 - 15.0 %    Platelet Count 183 150 - 450 10e9/L   Lipid panel reflex to direct LDL Fasting     Status: Abnormal   Result Value Ref Range    Cholesterol 158 <200 mg/dL    Triglycerides 168 (H) <150 mg/dL    HDL Cholesterol 33 (L) >39 mg/dL    LDL Cholesterol Calculated 91 <100 mg/dL    Non HDL Cholesterol 125 <130 mg/dL   Prostate spec antigen screen     Status: None   Result Value Ref Range    PSA 1.21 0 - 4 ug/L   Hemoglobin A1c     Status: Abnormal   Result Value Ref Range    Hemoglobin A1C 8.2 (H) 0 - 5.6 %   Comprehensive metabolic panel     Status: Abnormal   Result Value Ref Range    Sodium 138 133 - 144 mmol/L    Potassium 4.2 3.4 - 5.3 mmol/L    Chloride 107 94 - 109 mmol/L    Carbon Dioxide 28 20 - 32 mmol/L    Anion Gap 3 3 - 14 mmol/L    Glucose 218 (H) 70 - 99 mg/dL    Urea Nitrogen 13 7 - 30 mg/dL    Creatinine 0.84 0.66 - 1.25 mg/dL    GFR Estimate 90 >60 mL/min/[1.73_m2]    GFR Estimate If Black >90 >60 mL/min/[1.73_m2]    Calcium 8.7 8.5 - 10.1 mg/dL    Bilirubin Total 0.5 0.2 - 1.3 mg/dL    Albumin 3.7 3.4 - 5.0 g/dL    Protein Total 7.2 6.8 - 8.8 g/dL    Alkaline  "Phosphatase 76 40 - 150 U/L    ALT 95 (H) 0 - 70 U/L    AST 44 0 - 45 U/L      ASSESSMENT / PLAN:   Encounter for Medicare annual wellness exam      Type 2 diabetes mellitus without complication, without long-term current use of insulin (H)  Uncontrolled, he plans to work on a better diet and will continue with diabetic meds close follow-up  - glimepiride (AMARYL) 4 MG tablet  Dispense: 180 tablet; Refill: 1  - metFORMIN (GLUCOPHAGE) 1000 MG tablet  Dispense: 180 tablet; Refill: 1  - Hemoglobin A1c  - Comprehensive metabolic panel  - Hemoglobin A1c  - OFFICE/OUTPT VISIT,EST,LEVL IV    Essential hypertension with goal blood pressure less than 140/90  Controlled - continue medication.   - lisinopril (ZESTRIL) 10 MG tablet  Dispense: 90 tablet; Refill: 3  - CBC with platelets  - Comprehensive metabolic panel    Hyperlipidemia LDL goal <70  Controlled - continue medication.   - simvastatin (ZOCOR) 40 MG tablet  Dispense: 90 tablet; Refill: 3  - Lipid panel reflex to direct LDL Fasting  - Comprehensive metabolic panel    Morbid obesity (H)  Diet and exercise recommended as able  - OFFICE/OUTPT VISIT,EST,LEVL IV    Screening for AAA (abdominal aortic aneurysm)  - Abdominal Aortic Aneurysm Screening/Tracking  - US Aorta Medicare AAA Screening    History of smoking  - Abdominal Aortic Aneurysm Screening/Tracking  - US Aorta Medicare AAA Screening    Muscle cramps  Increased symptoms lately, work on hydration, stretching and will check labs  - OFFICE/OUTPT VISIT,EST,LEVL IV    Screening for prostate cancer  - Prostate spec antigen screen      End of Life Planning:  Patient currently has an advanced directive: Yes.  Practitioner is supportive of decision.    COUNSELING:  Reviewed preventive health counseling, as reflected in patient instructions    Estimated body mass index is 35.73 kg/m  as calculated from the following:    Height as of this encounter: 1.727 m (5' 8\").    Weight as of this encounter: 106.6 kg (235 " lb).    Weight management plan: Discussed healthy diet and exercise guidelines     reports that he quit smoking about 8 years ago. His smoking use included cigarettes. He has a 25.00 pack-year smoking history. He has never used smokeless tobacco.      Appropriate preventive services were discussed with this patient, including applicable screening as appropriate for cardiovascular disease, diabetes, osteopenia/osteoporosis, and glaucoma.  As appropriate for age/gender, discussed screening for colorectal cancer, prostate cancer, breast cancer, and cervical cancer. Checklist reviewing preventive services available has been given to the patient.    Reviewed patients plan of care and provided an AVS. The Basic Care Plan (routine screening as documented in Health Maintenance) for Francisco meets the Care Plan requirement. This Care Plan has been established and reviewed with the Patient.    Return in about 3 months (around 11/4/2020) for Medication Recheck and labs.     Counseling Resources:  ATP IV Guidelines  Pooled Cohorts Equation Calculator  Breast Cancer Risk Calculator  FRAX Risk Assessment  ICSI Preventive Guidelines  Dietary Guidelines for Americans, 2010  USDA's MyPlate  ASA Prophylaxis  Lung CA Screening        Dinh Perkins MD     [address] [address]  [email or web address]   Office: [phone]  Pager: [phone]       Identified Health Risks:

## 2020-08-04 NOTE — LETTER
[facility]  [address] [place] [id number]  [phone]                  August 4, 2020    AUTHORIZATION TO RELEASE PROTECTED HEALTH INFORMATION    Patient Name:  Francisco Thornton  YOB: 1950    Nickie MRN:[id number]             This will authorize [facility]  to request information from :     [facility]    The following information is to be released for health maintenance and continuing care purposes with my primary care clinic:                 Eye exam results.     -I understand that I may revoke this authorization by written request at any time to the address listed at the top of this form.  I understand that the revocation will not apply to information that has already been released in response to this authorization.    -This authorization last for one year after the date you sign it.     -[place] cannot prevent redisclosure of the information by the person or organization who receives your records under this authorization, and that information may not be covered by state and federal privacy protections after it is released. By signing this authorization, you release [place] from any and all liability resulting from a redisclosure by the recipient.    ___________________________________          _____________  Signature of Patient/Authorized Person                     Date        ____________________________________________  (Reason if patient is unable to sign)

## 2020-08-07 NOTE — RESULT ENCOUNTER NOTE
Dear Demond,    Here is a summary of your recent test results:  -Normal red blood cell (hgb) levels, normal white blood cell count and normal platelet levels.  -PSA (prostate specific antigen) test is normal.  This indicates a low likelihood of prostate cancer.  ADVISE: rechecking this in 1 year.  -Cholesterol levels are at your goal levels.  ADVISE: continuing your medication, a regular exercise program with at least 150 minutes of aerobic exercise per week, and eating a low saturated fat/low carbohydrate diet.  Also, you should recheck this fasting cholesterol panel in 12 months.  -Liver and gallbladder tests (ALT,AST, Alk phos,bilirubin) are slight elevated.  -Kidney function (GFR) is normal.  -Sodium is normal.  -Potassium is normal.  -Calcium is normal.  -Glucose is elevated due to your diabetes.  -A1C test (average blood sugar the last 2-3 months) is above your goal.   ADVISE: making a diabetic followup appointment in 3 months. Please check and record your blood sugars at least 4 times daily for 1 week prior to your appointment and bring for review.  Also, you should recheck your A1C in 3 months.    For additional lab test information, [email or web address].org is an excellent reference.    In addition, here is a list of due or overdue Health Maintenance reminders:  Colorectal Cancer Screening due  Eye Exam due on 04/24/2019    Please call us at [phone] (or use [email or web address]) to address the above recommendations if needed.           Thank you very much for trusting me and [facility].     Healthy regards,  Dinh Perkins MD

## 2020-08-09 ENCOUNTER — MYC MEDICAL ADVICE (OUTPATIENT)
Dept: FAMILY MEDICINE | Facility: CLINIC | Age: 70
End: 2020-08-09

## 2020-08-09 DIAGNOSIS — E11.9 TYPE 2 DIABETES MELLITUS WITHOUT COMPLICATION, WITHOUT LONG-TERM CURRENT USE OF INSULIN (H): ICD-10-CM

## 2020-08-09 DIAGNOSIS — E11.40 TYPE 2 DIABETES MELLITUS WITH DIABETIC NEUROPATHY, WITHOUT LONG-TERM CURRENT USE OF INSULIN (H): ICD-10-CM

## 2020-08-11 NOTE — TELEPHONE ENCOUNTER
Please see my chart message below     Pt asking for olzempic to be prescribed     Please advise     Thank you     Aundrea Schneider RN, BSN  [facility]

## 2020-08-12 ENCOUNTER — MYC MEDICAL ADVICE (OUTPATIENT)
Dept: FAMILY MEDICINE | Facility: CLINIC | Age: 70
End: 2020-08-12

## 2020-08-12 DIAGNOSIS — E11.40 TYPE 2 DIABETES MELLITUS WITH DIABETIC NEUROPATHY, WITHOUT LONG-TERM CURRENT USE OF INSULIN (H): Primary | ICD-10-CM

## 2020-08-13 NOTE — TELEPHONE ENCOUNTER
[employer] message sent.    Benton MALDONADO RN   [facility]

## 2020-08-14 NOTE — TELEPHONE ENCOUNTER
Routing to CDE/CC to help with medication affordability.    Benton MALDONADO RN   [facility] - [facility]

## 2020-08-17 ENCOUNTER — MYC MEDICAL ADVICE (OUTPATIENT)
Dept: FAMILY MEDICINE | Facility: CLINIC | Age: 70
End: 2020-08-17

## 2020-08-18 ENCOUNTER — MYC MEDICAL ADVICE (OUTPATIENT)
Dept: FAMILY MEDICINE | Facility: CLINIC | Age: 70
End: 2020-08-18

## 2020-08-18 ENCOUNTER — PATIENT OUTREACH (OUTPATIENT)
Dept: CARE COORDINATION | Facility: CLINIC | Age: 70
End: 2020-08-18

## 2020-08-18 NOTE — TELEPHONE ENCOUNTER
Routing to PCP to review and advise.    Benton MALDONADO RN   [facility]

## 2020-08-18 NOTE — LETTER
[facility]  [address] [place] [address]    August 19, 2020      Francisco Thornton  [address] [facility] [address] [facility] [address]      Dear Francisco,    I am a clinic community health worker who works with Geovany Perkins MD at [facility]. I recently tried to call and was unable to reach you. Below is a description of clinic care coordination and how I can further assist you.      The clinic care coordination team is made up of a registered nurse,  and community health worker who understand the health care system. The goal of clinic care coordination is to help you manage your health and improve access to the health care system in the most efficient manner. The team can assist you in meeting your health care goals by providing education, coordinating services, strengthening the communication among your providers and supporting you with any resource needs.    Please feel free to contact me at [phone] with any questions or concerns. We are focused on providing you with the highest-quality healthcare experience possible and that all starts with you.     Sincerely,     JANEE Miranda  Clinic Care Coordination  [facility]  Phone: [phone]

## 2020-08-18 NOTE — PROGRESS NOTES
Clinic Care Coordination Contact  [facility]/Voicemail    Referral Source: PCP  Clinical Data: Care Coordinator Outreach  Outreach attempted x 1.  Left message on patient's voicemail with call back information and requested return call.  Plan: Care Coordinator will try to reach patient again in 1-2 business days.    Chart Review: Referral from PCP for Financial Support: Medication Affordability.  Pt looking to get help with Diabetic medication affordability.    JANEE Miranda  Clinic Care Coordination  [facility] Clinics: [facility], and [facility]  Phone: [phone]

## 2020-08-18 NOTE — TELEPHONE ENCOUNTER
"Perhaps he can go to their website and sign up for a savings card if eligible (although I think it is not available it on medicare) or check with his pharmacist and see if they know of how to get a \"free month\"  "

## 2020-08-19 NOTE — PROGRESS NOTES
Clinic Care Coordination Contact  [facility]/Voicemail    Referral Source: PCP  Clinical Data: Care Coordinator Outreach  Outreach attempted x 2.  Left message on patient's voicemail with call back information and requested return call.  Plan: Care Coordinator will send care coordination introduction letter with care coordinator contact information and explanation of care coordination services via [employer]. Care Coordinator will do no further outreaches at this time.    Chart Review: Referral from PCP for Financial Support, Medication Affordability. Pt looking to get help with Diabetic medication affordability.    JANEE Miranda  Clinic Care Coordination  [facility] Clinics: [facility], [facility], and [facility]  Phone: [phone]

## 2020-08-20 NOTE — TELEPHONE ENCOUNTER
Called [phone], opt 9 -   Spoke with a representative - advised to go to [employer], register account with provider, then can request samples be sent directly to clinic (cannot be sent to patient).     Routing to PCP for further review/recommendations/orders.  Is this ok?      Ailyn Lopez RN  [facility]

## 2020-08-24 ENCOUNTER — MYC MEDICAL ADVICE (OUTPATIENT)
Dept: FAMILY MEDICINE | Facility: CLINIC | Age: 70
End: 2020-08-24

## 2020-08-24 DIAGNOSIS — E11.42 DIABETIC POLYNEUROPATHY ASSOCIATED WITH TYPE 2 DIABETES MELLITUS (H): ICD-10-CM

## 2020-08-24 DIAGNOSIS — E11.9 CONTROLLED TYPE 2 DIABETES MELLITUS WITHOUT COMPLICATION, WITHOUT LONG-TERM CURRENT USE OF INSULIN (H): Primary | ICD-10-CM

## 2020-08-25 NOTE — TELEPHONE ENCOUNTER
Routing to PCP to review and advise.  Writer unsure if Pt needs to check BG levels 4 times per day to qualify or be injecting insulin.      Benton MALDONADO RN   [facility]

## 2020-08-26 RX ORDER — PROCHLORPERAZINE 25 MG/1
1 SUPPOSITORY RECTAL
Qty: 3 EACH | Refills: 11 | Status: SHIPPED | OUTPATIENT
Start: 2020-08-26 | End: 2021-02-26

## 2020-08-26 RX ORDER — PROCHLORPERAZINE 25 MG/1
1 SUPPOSITORY RECTAL
Qty: 1 EACH | Refills: 3 | Status: SHIPPED | OUTPATIENT
Start: 2020-08-26 | End: 2021-02-26

## 2020-09-09 ENCOUNTER — MYC MEDICAL ADVICE (OUTPATIENT)
Dept: FAMILY MEDICINE | Facility: CLINIC | Age: 70
End: 2020-09-09

## 2020-09-09 NOTE — LETTER
[address] [address]  [phone]       September 21, 2020    Francisco Thornton  [address] [address] [facility] [address]    Demond:    Upon review of your chart, you are due for a flu shot, the new shingles vaccine and if you have not already done it, Hepatitis B vaccines.  We do ask that you check with insurance on the shingles vaccine though as it is very expensive and some plans do not pay for it.  Some even require it be done in a pharmacy not the clinic, so we need to have you ask that as well.      You can schedule an appointment for these vaccines  via [employer] or by calling the clinic at [phone].      Sincerely,          Dinh Perkins M.D./antonio   GILBERTO VILLEGAS called and updated report given on cervix 3 cm/80%. No new orders given.

## 2020-09-10 NOTE — TELEPHONE ENCOUNTER
Routing to [place]  Patient due for any immunizations?      Ailyn Lopez RN  [facility]

## 2020-09-11 NOTE — TELEPHONE ENCOUNTER
Response sent to patient.  Due for hep B if he has not done it before, shingrix and flu.  Needs to check with ins prior to scheduling for Shingrix.  Tatum Moore

## 2020-09-21 NOTE — TELEPHONE ENCOUNTER
Left non-detailed message for patient to call back.  Please schedule MA appointment for vaccines  when patient calls back.  (see previous notes for details)    I have been unable to reach this patient by phone.  A letter is being sent to the last known home address.    Thanks Estrella

## 2020-10-28 ENCOUNTER — TELEPHONE (OUTPATIENT)
Dept: FAMILY MEDICINE | Facility: CLINIC | Age: 70
End: 2020-10-28

## 2020-10-28 NOTE — TELEPHONE ENCOUNTER
Diabetes Education Scheduling Outreach #1:    Call to patient to schedule. Left message with phone number to call to schedule.    Plan for 2nd outreach attempt within 1 week.    April Bean  [facility]  Diabetes and Nutrition Scheduling

## 2020-11-14 DIAGNOSIS — E11.9 TYPE 2 DIABETES MELLITUS WITHOUT COMPLICATION, WITHOUT LONG-TERM CURRENT USE OF INSULIN (H): ICD-10-CM

## 2020-11-16 ENCOUNTER — HEALTH MAINTENANCE LETTER (OUTPATIENT)
Age: 70
End: 2020-11-16

## 2020-11-16 RX ORDER — GLIMEPIRIDE 4 MG/1
8 TABLET ORAL
Qty: 180 TABLET | Refills: 1 | OUTPATIENT
Start: 2020-11-16

## 2020-12-09 NOTE — TELEPHONE ENCOUNTER
Diabetes Education Scheduling Outreach #2:    Call to patient to schedule. Left message with phone number to call to schedule.    April Bean  [facility]  Diabetes and Nutrition Scheduling

## 2021-02-03 ENCOUNTER — MYC MEDICAL ADVICE (OUTPATIENT)
Dept: FAMILY MEDICINE | Facility: CLINIC | Age: 71
End: 2021-02-03

## 2021-02-03 DIAGNOSIS — E11.9 CONTROLLED TYPE 2 DIABETES MELLITUS WITHOUT COMPLICATION, WITHOUT LONG-TERM CURRENT USE OF INSULIN (H): Primary | ICD-10-CM

## 2021-02-04 NOTE — TELEPHONE ENCOUNTER
Routing to PCP to review and advise.    Benton MALDONADO RN   [facility]

## 2021-02-07 ENCOUNTER — HEALTH MAINTENANCE LETTER (OUTPATIENT)
Age: 71
End: 2021-02-07

## 2021-02-08 DIAGNOSIS — E11.9 TYPE 2 DIABETES MELLITUS WITHOUT COMPLICATION, WITHOUT LONG-TERM CURRENT USE OF INSULIN (H): ICD-10-CM

## 2021-02-08 NOTE — TELEPHONE ENCOUNTER
LOV: 8/4/2020  Patient due for 6 month DM check appt  No future appt scheduled    Routing to [facility] to assist in scheduling      Ailyn Lopez RN  [facility]

## 2021-02-11 ENCOUNTER — MYC MEDICAL ADVICE (OUTPATIENT)
Dept: EDUCATION SERVICES | Facility: CLINIC | Age: 71
End: 2021-02-11

## 2021-02-13 ENCOUNTER — MYC MEDICAL ADVICE (OUTPATIENT)
Dept: FAMILY MEDICINE | Facility: CLINIC | Age: 71
End: 2021-02-13

## 2021-02-15 NOTE — TELEPHONE ENCOUNTER
Please see my chart message below     Please review and advise     Thank you     Aundrea Schneider RN, BSN  [facility]

## 2021-02-18 NOTE — TELEPHONE ENCOUNTER
Second attempt - left detailed message.  Please rote to RV refill once appointment is scheduled.     April Harden MA

## 2021-02-24 RX ORDER — GLIMEPIRIDE 4 MG/1
8 TABLET ORAL
Qty: 180 TABLET | Refills: 1 | Status: SHIPPED | OUTPATIENT
Start: 2021-02-24 | End: 2021-02-26

## 2021-02-25 NOTE — PROGRESS NOTES
"    Assessment & Plan   Hyperlipidemia LDL goal <70  Controlled - continue medication.    Essential hypertension with goal blood pressure less than 140/90  Controlled - continue medication.  - Albumin Random Urine Quantitative with Creat Ratio    Diabetic polyneuropathy associated with type 2 diabetes mellitus (H) /   Uncontrolled type 2 diabetes mellitus with hyperglycemia (H)  Uncontrolled -he does not have a meter and that was prescribed and is not will be helpful for him with his diet.  He admits to not eating a very good diabetic diet and will work on getting better at that.  We will continue with medications him he has not started the semaglutide oral yet as it was costly and has a deductible to meet but he is willing to invest in that for now.  - HEMOGLOBIN A1C  - Hemoglobin A1c  - alcohol swab prep pads  Dispense: 100 each; Refill: 3  - blood glucose (NO BRAND SPECIFIED) test strip  Dispense: 100 strip; Refill: 6  - blood glucose calibration (NO BRAND SPECIFIED) solution  Dispense: 1 Bottle; Refill: 3  - blood glucose monitoring (NO BRAND SPECIFIED) meter device kit  Dispense: 1 kit; Refill: 0  - thin (NO BRAND SPECIFIED) lancets  Dispense: 100 each; Refill: 11  - glimepiride (AMARYL) 4 MG tablet  Dispense: 180 tablet; Refill: 1  - Semaglutide 7 MG TABS  Dispense: 90 tablet; Refill: 1  - Hemoglobin A1c    Morbid obesity (H)  Diet and exercise    Screen for colon cancer    Screening for AAA (abdominal aortic aneurysm)/ History of smoking  Did not schedule previously and new order sent  - Abdominal Aortic Aneurysm Screening/Tracking  - US Aorta Medicare AAA Screening      BMI:   Estimated body mass index is 35.28 kg/m  as calculated from the following:    Height as of this encounter: 1.727 m (5' 8\").    Weight as of this encounter: 105.2 kg (232 lb).   Weight management plan: Discussed healthy diet and exercise guidelines      Return in about 6 months (around 8/26/2021) for wellness exam with fasting labs, in " "person, with Dr Dinh Perkins; lab check in 3 months.      Dinh Perkins MD      [address] [address]  [email or web address]   Office: [phone]       Lulú Lagos is a 71 year old who presents for the following health issues     HPI       Diabetes Follow-up      How often are you checking your blood sugar? Not at all    What concerns do you have today about your diabetes? None     Do you have any of these symptoms? (Select all that apply)  Numbness in feet      BP Readings from Last 2 Encounters:   02/26/21 120/80   08/04/20 130/80     Hemoglobin A1C (%)   Date Value   02/26/2021 9.2 (H)   08/04/2020 8.2 (H)     LDL Cholesterol Calculated (mg/dL)   Date Value   08/04/2020 91   09/12/2019 82         Hyperlipidemia Follow-Up      Are you regularly taking any medication or supplement to lower your cholesterol?   Yes- Simvastatin    Are you having muscle aches or other side effects that you think could be caused by your cholesterol lowering medication?  No    Hypertension Follow-up      Do you check your blood pressure regularly outside of the clinic? No     Are you following a low salt diet? Yes    Are your blood pressures ever more than 140 on the top number (systolic) OR more   than 90 on the bottom number (diastolic), for example 140/90? Does not check      Review of Systems   Constitutional, HEENT, cardiovascular, pulmonary, GI, , musculoskeletal, neuro, skin, endocrine and psych systems are negative, except as otherwise noted.      Objective    /80   Pulse 78   Temp 96.4  F (35.8  C) (Tympanic)   Ht 1.727 m (5' 8\")   Wt 105.2 kg (232 lb)   SpO2 95%   BMI 35.28 kg/m    Body mass index is 35.28 kg/m .  Physical Exam   GENERAL: healthy, alert and no distress  EYES: Eyes grossly normal to inspection, PERRL and conjunctivae and sclerae normal  HENT: ear canals and TM's normal, nose and mouth without ulcers or lesions  NECK: no adenopathy, no " asymmetry, masses, or scars and thyroid normal to palpation  RESP: lungs clear to auscultation - no rales, rhonchi or wheezes  CV: regular rate and rhythm, normal S1 S2, no S3 or S4, no murmur, click or rub, no peripheral edema and peripheral pulses strong  ABDOMEN: soft, nontender, no hepatosplenomegaly, no masses and bowel sounds normal  MS: no gross musculoskeletal defects noted, no edema  SKIN: no suspicious lesions or rashes  NEURO: Normal strength and tone, mentation intact and speech normal  PSYCH: mentation appears normal, affect normal/bright  Diabetic foot exam: normal DP and PT pulses, no trophic changes or ulcerative lesions and reduced sensation at bilateral soles of feet, no ulcers noted.    Results for orders placed or performed in visit on 02/26/21 (from the past 24 hour(s))   HEMOGLOBIN A1C   Result Value Ref Range    Hemoglobin A1C 9.2 (H) 0 - 5.6 %

## 2021-02-26 ENCOUNTER — OFFICE VISIT (OUTPATIENT)
Dept: FAMILY MEDICINE | Facility: CLINIC | Age: 71
End: 2021-02-26
Payer: MEDICARE

## 2021-02-26 VITALS
TEMPERATURE: 96.4 F | HEART RATE: 78 BPM | HEIGHT: 68 IN | SYSTOLIC BLOOD PRESSURE: 120 MMHG | BODY MASS INDEX: 35.16 KG/M2 | DIASTOLIC BLOOD PRESSURE: 80 MMHG | WEIGHT: 232 LBS | OXYGEN SATURATION: 95 %

## 2021-02-26 DIAGNOSIS — I10 ESSENTIAL HYPERTENSION WITH GOAL BLOOD PRESSURE LESS THAN 140/90: ICD-10-CM

## 2021-02-26 DIAGNOSIS — Z87.891 HISTORY OF SMOKING: ICD-10-CM

## 2021-02-26 DIAGNOSIS — E11.42 DIABETIC POLYNEUROPATHY ASSOCIATED WITH TYPE 2 DIABETES MELLITUS (H): ICD-10-CM

## 2021-02-26 DIAGNOSIS — E66.01 MORBID OBESITY (H): ICD-10-CM

## 2021-02-26 DIAGNOSIS — E11.65 UNCONTROLLED TYPE 2 DIABETES MELLITUS WITH HYPERGLYCEMIA (H): ICD-10-CM

## 2021-02-26 DIAGNOSIS — Z13.6 SCREENING FOR AAA (ABDOMINAL AORTIC ANEURYSM): ICD-10-CM

## 2021-02-26 DIAGNOSIS — E78.5 HYPERLIPIDEMIA LDL GOAL <70: Primary | ICD-10-CM

## 2021-02-26 DIAGNOSIS — Z12.11 SCREEN FOR COLON CANCER: ICD-10-CM

## 2021-02-26 LAB
CREAT UR-MCNC: 54 MG/DL
HBA1C MFR BLD: 9.2 % (ref 0–5.6)
MICROALBUMIN UR-MCNC: 27 MG/L
MICROALBUMIN/CREAT UR: 49.45 MG/G CR (ref 0–17)

## 2021-02-26 PROCEDURE — 36415 COLL VENOUS BLD VENIPUNCTURE: CPT | Performed by: FAMILY MEDICINE

## 2021-02-26 PROCEDURE — 82043 UR ALBUMIN QUANTITATIVE: CPT | Performed by: FAMILY MEDICINE

## 2021-02-26 PROCEDURE — 99214 OFFICE O/P EST MOD 30 MIN: CPT | Performed by: FAMILY MEDICINE

## 2021-02-26 PROCEDURE — 83036 HEMOGLOBIN GLYCOSYLATED A1C: CPT | Performed by: FAMILY MEDICINE

## 2021-02-26 RX ORDER — GLUCOSAMINE HCL/CHONDROITIN SU 500-400 MG
CAPSULE ORAL
Qty: 100 EACH | Refills: 3 | Status: SHIPPED | OUTPATIENT
Start: 2021-02-26 | End: 2022-11-22

## 2021-02-26 RX ORDER — GLUCOSAMINE HCL/CHONDROITIN SU 500-400 MG
CAPSULE ORAL
Qty: 100 EACH | Refills: 3 | Status: SHIPPED | OUTPATIENT
Start: 2021-02-26 | End: 2021-02-26

## 2021-02-26 RX ORDER — GLIMEPIRIDE 4 MG/1
8 TABLET ORAL
Qty: 180 TABLET | Refills: 1 | Status: SHIPPED | OUTPATIENT
Start: 2021-02-26 | End: 2021-09-30

## 2021-02-26 RX ORDER — LANCETS
EACH MISCELLANEOUS
Qty: 100 EACH | Refills: 11 | Status: SHIPPED | OUTPATIENT
Start: 2021-02-26 | End: 2021-02-26

## 2021-02-26 RX ORDER — LANCETS
EACH MISCELLANEOUS
Qty: 100 EACH | Refills: 11 | Status: SHIPPED | OUTPATIENT
Start: 2021-02-26 | End: 2022-11-22

## 2021-02-26 ASSESSMENT — MIFFLIN-ST. JEOR: SCORE: 1791.85

## 2021-02-26 NOTE — PATIENT INSTRUCTIONS
Preventive Health Recommendations:     See your health care provider every year to    Review health changes.     Discuss preventive care.      Review your medicines if your doctor has prescribed any.      Talk with your health care provider about whether you should have a test to screen for prostate cancer (PSA).    Every 3 years, have a diabetes test (fasting glucose). If you are at risk for diabetes, you should have this test more often.    Every 5 years, have a cholesterol test. Have this test more often if you are at risk for high cholesterol or heart disease.     Every 10 years, have a colonoscopy. Or, have a yearly FIT test (stool test). These exams will check for colon cancer.    Talk to with your health care provider about screening for Abdominal Aortic Aneurysm if you have a family history of AAA or have a history of smoking.    Shots:     Get a flu shot each year.     Get a tetanus shot every 10 years.     Talk to your doctor about your pneumonia vaccines. There are now two you should receive - Pneumovax (PPSV 23) and Prevnar (PCV 13).     Talk to your pharmacist about a shingles vaccine.     Talk to your doctor about the hepatitis B vaccine.  Nutrition:     Eat at least 5 servings of fruits and vegetables each day.     Eat whole-grain bread, whole-wheat pasta and brown rice instead of white grains and rice.     Get adequate Calcium and Vitamin D.   Lifestyle    Exercise for at least 150 minutes a week (30 minutes a day, 5 days a week). This will help you control your weight and prevent disease.     Limit alcohol to one drink per day.     No smoking.     Wear sunscreen to prevent skin cancer.    See your dentist every six months for an exam and cleaning.    See your eye doctor every 1 to 2 years to screen for conditions such as glaucoma, macular degeneration, cataracts, etc.    Personalized Prevention Plan  You are due for the preventive services outlined below.  Your care team is available to assist you  in scheduling these services.  If you have already completed any of these items, please share that information with your care team to update in your medical record.  Health Maintenance Due   Topic Date Due     ANNUAL REVIEW OF HM ORDERS  1950     Colorectal Cancer Screening  01/20/1962     Zoster (Shingles) Vaccine (2 of 3) 09/06/2016     Kidney Microalbumin Urine Test  11/13/2019     Flu Vaccine (1) 09/01/2020     PHQ-2  01/01/2021     A1C Lab  02/04/2021

## 2021-03-01 NOTE — RESULT ENCOUNTER NOTE
Dear Demond,    Here is a summary of your recent test results:  -A1C test (average blood sugar the last 2-3 months) is above your goal.   ADVISE: making a diabetic followup appointment in 3 months  Please check and record your blood sugars at least 4 times daily for 1 week prior to your appointment and bring for review.  Also, you should recheck your A1C in 3 months.  -Microalbumin (urine protein) level is elevated. This is suggestive of early damage to your kidneys from high blood pressure and diabetes.  ADVISE: avoiding anti-inflamatory agents such as ibuprofen (Advil, Motrin) or naproxen (Aleve) as much as possible, keeping your blood pressure in a normal range, and continuing your medication (lisinopril) that helps protect your kidneys.  Also, this should be rechecked in 1 year.     For additional lab test information, [email or web address] is an excellent reference.    In addition, here is a list of due or overdue Health Maintenance reminders:  Colorectal Cancer Screening due.    Please call us at [phone] (or use [employer]) to address the above recommendations if needed.           Thank you very much for trusting me and [facility].     Have a peaceful day.    Healthy regards,  Dinh Perkins MD

## 2021-03-11 ENCOUNTER — HOSPITAL ENCOUNTER (OUTPATIENT)
Dept: ULTRASOUND IMAGING | Facility: CLINIC | Age: 71
Discharge: HOME | End: 2021-03-11
Attending: FAMILY MEDICINE | Admitting: FAMILY MEDICINE
Payer: MEDICARE

## 2021-03-11 DIAGNOSIS — Z87.891 HISTORY OF SMOKING: ICD-10-CM

## 2021-03-11 DIAGNOSIS — Z13.6 SCREENING FOR AAA (ABDOMINAL AORTIC ANEURYSM): ICD-10-CM

## 2021-03-11 PROCEDURE — 76706 US ABDL AORTA SCREEN AAA: CPT

## 2021-03-11 NOTE — RESULT ENCOUNTER NOTE
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 Recommend repeat ultrasound in 5 years.    For additional lab test information, [email or web address] is an excellent reference.    In addition, here is a list of due or overdue Health Maintenance reminders:  Colorectal Cancer Screening Due           Thank you very much for trusting me and [facility].     Have a peaceful day.    Healthy regards,  Dinh Perkins MD

## 2021-05-19 DIAGNOSIS — I10 ESSENTIAL HYPERTENSION WITH GOAL BLOOD PRESSURE LESS THAN 140/90: ICD-10-CM

## 2021-05-20 RX ORDER — LISINOPRIL 10 MG/1
TABLET ORAL
Qty: 90 TABLET | Refills: 0 | Status: SHIPPED | OUTPATIENT
Start: 2021-05-20 | End: 2021-09-30

## 2021-05-26 DIAGNOSIS — E11.42 DIABETIC POLYNEUROPATHY ASSOCIATED WITH TYPE 2 DIABETES MELLITUS (H): ICD-10-CM

## 2021-05-26 DIAGNOSIS — E11.65 UNCONTROLLED TYPE 2 DIABETES MELLITUS WITH HYPERGLYCEMIA (H): ICD-10-CM

## 2021-05-26 LAB — HBA1C MFR BLD: 7.3 % (ref 0–5.6)

## 2021-05-26 PROCEDURE — 36415 COLL VENOUS BLD VENIPUNCTURE: CPT | Performed by: FAMILY MEDICINE

## 2021-05-26 PROCEDURE — 83036 HEMOGLOBIN GLYCOSYLATED A1C: CPT | Performed by: FAMILY MEDICINE

## 2021-05-26 NOTE — RESULT ENCOUNTER NOTE
Dear Demond,    Here is a summary of your recent test results:  -A1C (test of diabetes control the last 2-3 months) is near your goal - Good Job!. Please continue with your current plan. Also, you should make an appointment to see me and recheck your A1C test in 6 months.     For additional lab test information, [email or web address] is an excellent reference.    In addition, here is a list of due or overdue Health Maintenance reminders:  Colorectal Cancer Screening     Please call us at [phone] (or use [employer]) to address the above recommendations if needed.           Thank you very much for trusting me and  [employer] [facility].     Have a peaceful day.    Healthy regards,  Dinh Perkins MD

## 2021-08-15 DIAGNOSIS — E78.5 HYPERLIPIDEMIA LDL GOAL <70: ICD-10-CM

## 2021-08-15 DIAGNOSIS — E11.9 TYPE 2 DIABETES MELLITUS WITHOUT COMPLICATION, WITHOUT LONG-TERM CURRENT USE OF INSULIN (H): ICD-10-CM

## 2021-08-15 NOTE — LETTER
[facility] [address] [address]  [phone]       August 30, 2021    Francisco LURDES Thornton  [address] [facility] [address]        To Francisco:    We have been calling you regarding a recent refill request we received for metFORMIN  And simvastatin .  Unfortunately, we were unable to reach you.  We are notifying you that you are due for wellness check prior to your next refill.  You can schedule this appointment via [employer] or by calling the clinic at [phone] Option 1.          Sincerely,          Dinh Perkins M.D./wayne

## 2021-08-17 NOTE — TELEPHONE ENCOUNTER
Routing refill request to provider for review/approval because:  Lab work     Arelis Benavidez RN

## 2021-08-18 RX ORDER — SIMVASTATIN 40 MG
TABLET ORAL
Qty: 90 TABLET | Refills: 0 | Status: SHIPPED | OUTPATIENT
Start: 2021-08-18 | End: 2021-09-30

## 2021-08-18 NOTE — TELEPHONE ENCOUNTER
Overdue for wellness, please schedule, med sent for now    Last visit in this dept:    2/26/2021     Next visit in this dept:       Health Maintenance   Topic Date Due     COLORECTAL CANCER SCREENING  Never done     ZOSTER IMMUNIZATION (2 of 3) 09/06/2016     PHQ-2  01/01/2021     ADVANCE CARE PLANNING  07/12/2021     EYE EXAM  08/01/2021     BMP  08/04/2021     LIPID  08/04/2021     FALL RISK ASSESSMENT  08/04/2021     MEDICARE ANNUAL WELLNESS VISIT  08/04/2021     PSA  08/04/2021     INFLUENZA VACCINE (1) 09/01/2021     A1C  11/26/2021     MICROALBUMIN  02/26/2022     DIABETIC FOOT EXAM  02/26/2022     ANNUAL REVIEW OF HM ORDERS  02/26/2022     Pneumococcal Vaccine: 65+ Years (2 of 2 - PPSV23) 07/25/2022     DTAP/TDAP/TD IMMUNIZATION (3 - Td or Tdap) 02/12/2028     HEPATITIS C SCREENING  Completed     AORTIC ANEURYSM SCREENING (SYSTEM ASSIGNED)  Completed     COVID-19 Vaccine  Completed     IPV IMMUNIZATION  Aged Out     MENINGITIS IMMUNIZATION  Aged Out

## 2021-08-19 ENCOUNTER — MYC MEDICAL ADVICE (OUTPATIENT)
Dept: FAMILY MEDICINE | Facility: CLINIC | Age: 71
End: 2021-08-19

## 2021-09-18 ENCOUNTER — HEALTH MAINTENANCE LETTER (OUTPATIENT)
Age: 71
End: 2021-09-18

## 2021-09-30 ENCOUNTER — OFFICE VISIT (OUTPATIENT)
Dept: FAMILY MEDICINE | Facility: CLINIC | Age: 71
End: 2021-09-30
Payer: MEDICARE

## 2021-09-30 VITALS
RESPIRATION RATE: 20 BRPM | WEIGHT: 223.5 LBS | DIASTOLIC BLOOD PRESSURE: 70 MMHG | HEIGHT: 68 IN | OXYGEN SATURATION: 95 % | SYSTOLIC BLOOD PRESSURE: 118 MMHG | BODY MASS INDEX: 33.87 KG/M2 | TEMPERATURE: 98.1 F | HEART RATE: 74 BPM

## 2021-09-30 DIAGNOSIS — Z00.00 ENCOUNTER FOR MEDICARE ANNUAL WELLNESS EXAM: Primary | ICD-10-CM

## 2021-09-30 DIAGNOSIS — Z12.5 SCREENING FOR PROSTATE CANCER: ICD-10-CM

## 2021-09-30 DIAGNOSIS — E78.5 HYPERLIPIDEMIA LDL GOAL <70: ICD-10-CM

## 2021-09-30 DIAGNOSIS — E11.65 UNCONTROLLED TYPE 2 DIABETES MELLITUS WITH HYPERGLYCEMIA (H): ICD-10-CM

## 2021-09-30 DIAGNOSIS — I10 ESSENTIAL HYPERTENSION WITH GOAL BLOOD PRESSURE LESS THAN 140/90: ICD-10-CM

## 2021-09-30 DIAGNOSIS — E11.9 TYPE 2 DIABETES MELLITUS WITHOUT COMPLICATION, WITHOUT LONG-TERM CURRENT USE OF INSULIN (H): ICD-10-CM

## 2021-09-30 DIAGNOSIS — Z12.11 SCREEN FOR COLON CANCER: ICD-10-CM

## 2021-09-30 DIAGNOSIS — Z13.0 SCREENING, DEFICIENCY ANEMIA, IRON: ICD-10-CM

## 2021-09-30 LAB
ERYTHROCYTE [DISTWIDTH] IN BLOOD BY AUTOMATED COUNT: 12.6 % (ref 10–15)
HBA1C MFR BLD: 8.1 % (ref 0–5.6)
HCT VFR BLD AUTO: 44.7 % (ref 40–53)
HGB BLD-MCNC: 16 G/DL (ref 13.3–17.7)
MCH RBC QN AUTO: 31.9 PG (ref 26.5–33)
MCHC RBC AUTO-ENTMCNC: 35.8 G/DL (ref 31.5–36.5)
MCV RBC AUTO: 89 FL (ref 78–100)
PLATELET # BLD AUTO: 208 10E3/UL (ref 150–450)
RBC # BLD AUTO: 5.02 10E6/UL (ref 4.4–5.9)
WBC # BLD AUTO: 7.9 10E3/UL (ref 4–11)

## 2021-09-30 PROCEDURE — 80053 COMPREHEN METABOLIC PANEL: CPT | Performed by: FAMILY MEDICINE

## 2021-09-30 PROCEDURE — 36415 COLL VENOUS BLD VENIPUNCTURE: CPT | Performed by: FAMILY MEDICINE

## 2021-09-30 PROCEDURE — 90471 IMMUNIZATION ADMIN: CPT | Performed by: FAMILY MEDICINE

## 2021-09-30 PROCEDURE — G0103 PSA SCREENING: HCPCS | Performed by: FAMILY MEDICINE

## 2021-09-30 PROCEDURE — 83036 HEMOGLOBIN GLYCOSYLATED A1C: CPT | Performed by: FAMILY MEDICINE

## 2021-09-30 PROCEDURE — 85027 COMPLETE CBC AUTOMATED: CPT | Performed by: FAMILY MEDICINE

## 2021-09-30 PROCEDURE — G0438 PPPS, INITIAL VISIT: HCPCS | Performed by: FAMILY MEDICINE

## 2021-09-30 PROCEDURE — 80061 LIPID PANEL: CPT | Performed by: FAMILY MEDICINE

## 2021-09-30 PROCEDURE — G0008 ADMIN INFLUENZA VIRUS VAC: HCPCS | Mod: 59 | Performed by: FAMILY MEDICINE

## 2021-09-30 PROCEDURE — 99214 OFFICE O/P EST MOD 30 MIN: CPT | Mod: 25 | Performed by: FAMILY MEDICINE

## 2021-09-30 PROCEDURE — 90662 IIV NO PRSV INCREASED AG IM: CPT | Performed by: FAMILY MEDICINE

## 2021-09-30 PROCEDURE — 90750 HZV VACC RECOMBINANT IM: CPT | Performed by: FAMILY MEDICINE

## 2021-09-30 RX ORDER — LISINOPRIL 10 MG/1
10 TABLET ORAL DAILY
Qty: 90 TABLET | Refills: 3 | Status: SHIPPED | OUTPATIENT
Start: 2021-09-30 | End: 2022-10-11

## 2021-09-30 RX ORDER — SIMVASTATIN 40 MG
40 TABLET ORAL AT BEDTIME
Qty: 90 TABLET | Refills: 3 | Status: SHIPPED | OUTPATIENT
Start: 2021-09-30 | End: 2022-10-15

## 2021-09-30 RX ORDER — GLIMEPIRIDE 4 MG/1
8 TABLET ORAL
Qty: 180 TABLET | Refills: 1 | Status: SHIPPED | OUTPATIENT
Start: 2021-09-30 | End: 2022-03-29

## 2021-09-30 RX ORDER — PIOGLITAZONEHYDROCHLORIDE 15 MG/1
15 TABLET ORAL DAILY
Qty: 90 TABLET | Refills: 1 | Status: SHIPPED | OUTPATIENT
Start: 2021-09-30 | End: 2022-03-18

## 2021-09-30 ASSESSMENT — MIFFLIN-ST. JEOR: SCORE: 1753.29

## 2021-09-30 NOTE — PROGRESS NOTES
SUBJECTIVE:   CC: Francisco Thornton is an 71 year old male who presents for preventive health visit.     Patient has been advised of split billing requirements and indicates understanding: Yes  HPI    Healthy Habits:    Do you get at least three servings of calcium containing foods daily (dairy, green leafy vegetables, etc.)? yes    Amount of exercise or daily activities, outside of work: 5 day(s) per week    Problems taking medications regularly Yes cost of med    Medication side effects: No    Have you had an eye exam in the past two years? no    Do you see a dentist twice per year? no    Do you have sleep apnea, excessive snoring or daytime drowsiness?no    Diabetes Follow-up    How often are you checking your blood sugar? One time daily  What time of day are you checking your blood sugars (select all that apply)?  breaktime at work at 9:30 am  Have you had any blood sugars above 200?  Yes 2 or 3 times a week  Have you had any blood sugars below 70?  No    What symptoms do you notice when your blood sugar is low?  None and Not applicable    What concerns do you have today about your diabetes? Upset he cannot get the medication he prefers - semaglutide-which worked well     Do you have any of these symptoms? (Select all that apply)  Numbness in feet, Burning in feet and Redness, sores, or blisters on feet    Have you had a diabetic eye exam in the last 12 months? No plans on making an appt soon        BP Readings from Last 2 Encounters:   02/26/21 120/80   08/04/20 130/80     Hemoglobin A1C (%)   Date Value   05/26/2021 7.3 (H)   02/26/2021 9.2 (H)     LDL Cholesterol Calculated (mg/dL)   Date Value   08/04/2020 91   09/12/2019 82       Today's PHQ-2 Score:   PHQ-2 ( 1999 [employer]) 8/4/2020 5/18/2020   Q1: Little interest or pleasure in doing things 0 0   Q2: Feeling down, depressed or hopeless 0 0   PHQ-2 Score 0 0       Abuse: Current or Past(Physical, Sexual or Emotional)- No  Do you feel safe in your  "environment? Yes        Social History     Tobacco Use     Smoking status: Former Smoker     Packs/day: 1.00     Years: 25.00     Pack years: 25.00     Types: Cigarettes     Quit date: 2012     Years since quittin.3     Smokeless tobacco: Never Used     Tobacco comment:  PPD   Substance Use Topics     Alcohol use: No     If you drink alcohol do you typically have >3 drinks per day or >7 drinks per week? No                      Last PSA:   PSA   Date Value Ref Range Status   2020 1.21 0 - 4 ug/L Final     Comment:     Assay Method:  Chemiluminescence using Siemens Vista analyzer       Reviewed orders with patient. Reviewed health maintenance and updated orders accordingly - Yes  Reviewed and updated as needed this visit by clinical staff  Tobacco  Allergies  Meds  Problems  Med Hx  Surg Hx  Fam Hx          Reviewed and updated as needed this visit by Provider  Tobacco  Allergies  Meds  Problems  Med Hx  Surg Hx  Fam Hx           ROS:  Review of Systems   Constitutional, HEENT, cardiovascular, pulmonary, GI, , musculoskeletal, neuro, skin, endocrine and psych systems are negative, except as otherwise noted.  OBJECTIVE:   /70   Pulse 74   Temp 98.1  F (36.7  C) (Tympanic)   Resp 20   Ht 1.727 m (5' 8\")   Wt 101.4 kg (223 lb 8 oz)   SpO2 95%   BMI 33.98 kg/m    EXAM:  GENERAL: healthy, alert and no distress  EYES: Eyes grossly normal to inspection, PERRL and conjunctivae and sclerae normal  HENT: ear canals and TM's normal, nose and mouth without ulcers or lesions  NECK: no adenopathy, no asymmetry, masses, or scars and thyroid normal to palpation  RESP: lungs clear to auscultation - no rales, rhonchi or wheezes  BREAST: normal without masses, tenderness or nipple discharge and no palpable axillary masses or adenopathy  CV: regular rate and rhythm, normal S1 S2, no S3 or S4, no murmur, click or rub, no peripheral edema and peripheral pulses strong  ABDOMEN: soft, " nontender, no hepatosplenomegaly, no masses and bowel sounds normal   (male): normal male genitalia without lesions or urethral discharge, no hernia  MS: no gross musculoskeletal defects noted, no edema  SKIN: no suspicious lesions or rashes  NEURO: Normal strength and tone, mentation intact and speech normal  PSYCH: mentation appears normal, affect normal/bright  LYMPH: no cervical, supraclavicular, axillary, or inguinal adenopathy  RECTAL: declined exam  Diabetic foot exam: normal DP and PT pulses, no trophic changes or ulcerative lesions and reduced sensation at soles julio césar     ASSESSMENT/PLAN:   Encounter for Medicare annual wellness exam      Uncontrolled type 2 diabetes mellitus with hyperglycemia (H)  Type 2 diabetes mellitus without complication, without long-term current use of insulin (H)  Less well controlled since stopping semaglutide oral.  (Stopped due to extreme elevated because when he hit the donut hole Medicare coverage) he is interested in more cost effective option.  We will start Actos as he does not have a history of heart failure and is very cost effective and efficient.  Side effects discussed cost to watch for and will check labs in 3 months.  Plans to get an eye exam coming up.  - Comprehensive metabolic panel (BMP + Alb, Alk Phos, ALT, AST, Total. Bili, TP)  - pioglitazone (ACTOS) 15 MG tablet  Dispense: 90 tablet; Refill: 1  - **Comprehensive metabolic panel FUTURE 2mo  - **A1C FUTURE 3mo  - metFORMIN (GLUCOPHAGE) 1000 MG tablet  Dispense: 180 tablet; Refill: 1  - glimepiride (AMARYL) 4 MG tablet  Dispense: 180 tablet; Refill: 1    Essential hypertension with goal blood pressure less than 140/90  Controlled - continue medication.  - Comprehensive metabolic panel (BMP + Alb, Alk Phos, ALT, AST, Total. Bili, TP)  - **Comprehensive metabolic panel FUTURE 2mo  - lisinopril (ZESTRIL) 10 MG tablet  Dispense: 90 tablet; Refill: 3    Hyperlipidemia LDL goal <70  Controlled - continue  "medication.  - Lipid panel reflex to direct LDL Fasting  - Comprehensive metabolic panel (BMP + Alb, Alk Phos, ALT, AST, Total. Bili, TP)  - **Comprehensive metabolic panel FUTURE 2mo  - simvastatin (ZOCOR) 40 MG tablet  Dispense: 90 tablet; Refill: 3      Screening, deficiency anemia, iron  - CBC with platelets    Screening for prostate cancer  - PROSTATE SPEC ANTIGEN SCREEN    Screen for colon cancer  - Fecal colorectal cancer screen (FIT)      COUNSELING:  Reviewed preventive health counseling, as reflected in patient instructions    Estimated body mass index is 33.98 kg/m  as calculated from the following:    Height as of this encounter: 1.727 m (5' 8\").    Weight as of this encounter: 101.4 kg (223 lb 8 oz).  Weight management plan: Discussed healthy diet and exercise guidelines     reports that he quit smoking about 9 years ago. His smoking use included cigarettes. He has a 25.00 pack-year smoking history. He has never used smokeless tobacco.      Return in about 53 weeks (around 10/6/2022) for Annual Wellness Visit.           Dinh Perkins MD     [address] [address]  [email or web address]     Office: [phone]       "

## 2021-09-30 NOTE — PATIENT INSTRUCTIONS
Patient Education   Personalized Prevention Plan  You are due for the preventive services outlined below.  Your care team is available to assist you in scheduling these services.  If you have already completed any of these items, please share that information with your care team to update in your medical record.  Health Maintenance Due   Topic Date Due     Colorectal Cancer Screening  Never done     Zoster (Shingles) Vaccine (2 of 3) 09/06/2016     PHQ-2  01/01/2021     Eye Exam  08/01/2021     Basic Metabolic Panel  08/04/2021     Comprehensive Metabolic Panel  08/04/2021     Cholesterol Lab  08/04/2021     Prostate Test  08/04/2021     FALL RISK ASSESSMENT  08/04/2021     Complete Blood Count  08/04/2021     Flu Vaccine (1) 09/01/2021

## 2021-10-01 LAB
ALBUMIN SERPL-MCNC: 4.1 G/DL (ref 3.4–5)
ALP SERPL-CCNC: 68 U/L (ref 40–150)
ALT SERPL W P-5'-P-CCNC: 66 U/L (ref 0–70)
ANION GAP SERPL CALCULATED.3IONS-SCNC: 10 MMOL/L (ref 3–14)
AST SERPL W P-5'-P-CCNC: 32 U/L (ref 0–45)
BILIRUB SERPL-MCNC: 0.5 MG/DL (ref 0.2–1.3)
BUN SERPL-MCNC: 17 MG/DL (ref 7–30)
CALCIUM SERPL-MCNC: 9.4 MG/DL (ref 8.5–10.1)
CHLORIDE BLD-SCNC: 106 MMOL/L (ref 94–109)
CHOLEST SERPL-MCNC: 198 MG/DL
CO2 SERPL-SCNC: 20 MMOL/L (ref 20–32)
CREAT SERPL-MCNC: 0.8 MG/DL (ref 0.66–1.25)
FASTING STATUS PATIENT QL REPORTED: NO
GFR SERPL CREATININE-BSD FRML MDRD: >90 ML/MIN/1.73M2
GLUCOSE BLD-MCNC: 164 MG/DL (ref 70–99)
HDLC SERPL-MCNC: 35 MG/DL
LDLC SERPL CALC-MCNC: 120 MG/DL
NONHDLC SERPL-MCNC: 163 MG/DL
POTASSIUM BLD-SCNC: 4.6 MMOL/L (ref 3.4–5.3)
PROT SERPL-MCNC: 7.6 G/DL (ref 6.8–8.8)
PSA SERPL-MCNC: 1.23 UG/L (ref 0–4)
SODIUM SERPL-SCNC: 136 MMOL/L (ref 133–144)
TRIGL SERPL-MCNC: 213 MG/DL

## 2021-10-01 NOTE — RESULT ENCOUNTER NOTE
Dear Demond,    Here is a summary of your recent test results:  -Normal red blood cell (hgb) levels, normal white blood cell count and normal platelet levels.  -PSA (prostate specific antigen) test is normal.  This indicates a low likelihood of prostate cancer.  ADVISE: rechecking this in 1 year.  -Cholesterol levels are at your goal levels.  ADVISE: continuing your medication, a regular exercise program with at least 150 minutes of aerobic exercise per week, and eating a low saturated fat/low carbohydrate diet.  Also, you should recheck this fasting cholesterol panel in 12 months.  -Liver and gallbladder tests (ALT,AST, Alk phos,bilirubin) are normal.  -Kidney function (GFR) is normal.  -Sodium is normal.  -Potassium is normal.  -Calcium is normal.  -Glucose is elevated due to your diabetes.  -A1C test (average blood sugar the last 2-3 months) is above your goal.   ADVISE: Increasing your medication as planned.  Also, you should recheck your A1C in 3 months.    For additional lab test information, [email or web address] is an excellent reference.    In addition, here is a list of due or overdue Health Maintenance reminders:  Colorectal Cancer Screening Never done  PHQ-2 due on 01/01/2021  Eye Exam due on 08/01/2021  FALL RISK ASSESSMENT due on 08/04/2021    Please call us at [phone] (or use [employer]) to address the above recommendations if needed.           Thank you very much for trusting me and [facility].     Have a peaceful day.    Healthy regards,  Dinh Perkins MD

## 2021-12-07 ENCOUNTER — DOCUMENTATION ONLY (OUTPATIENT)
Dept: LAB | Facility: CLINIC | Age: 71
End: 2021-12-07
Payer: MEDICARE

## 2021-12-07 DIAGNOSIS — E11.9 CONTROLLED TYPE 2 DIABETES MELLITUS WITHOUT COMPLICATION, WITHOUT LONG-TERM CURRENT USE OF INSULIN (H): Primary | ICD-10-CM

## 2021-12-12 ENCOUNTER — MYC MEDICAL ADVICE (OUTPATIENT)
Dept: FAMILY MEDICINE | Facility: CLINIC | Age: 71
End: 2021-12-12
Payer: MEDICARE

## 2021-12-12 DIAGNOSIS — E11.9 TYPE 2 DIABETES MELLITUS WITHOUT COMPLICATION, WITHOUT LONG-TERM CURRENT USE OF INSULIN (H): ICD-10-CM

## 2021-12-12 DIAGNOSIS — R42 VERTIGO: Primary | ICD-10-CM

## 2021-12-14 ENCOUNTER — ALLIED HEALTH/NURSE VISIT (OUTPATIENT)
Dept: NURSING | Facility: CLINIC | Age: 71
End: 2021-12-14
Payer: MEDICARE

## 2021-12-14 ENCOUNTER — ANCILLARY PROCEDURE (OUTPATIENT)
Dept: GENERAL RADIOLOGY | Facility: CLINIC | Age: 71
End: 2021-12-14
Attending: NURSE PRACTITIONER
Payer: MEDICARE

## 2021-12-14 ENCOUNTER — LAB (OUTPATIENT)
Dept: LAB | Facility: CLINIC | Age: 71
End: 2021-12-14
Payer: MEDICARE

## 2021-12-14 ENCOUNTER — OFFICE VISIT (OUTPATIENT)
Dept: FAMILY MEDICINE | Facility: CLINIC | Age: 71
End: 2021-12-14
Payer: MEDICARE

## 2021-12-14 VITALS
WEIGHT: 227 LBS | DIASTOLIC BLOOD PRESSURE: 70 MMHG | BODY MASS INDEX: 34.52 KG/M2 | SYSTOLIC BLOOD PRESSURE: 140 MMHG | HEART RATE: 83 BPM | OXYGEN SATURATION: 96 %

## 2021-12-14 DIAGNOSIS — I10 HYPERTENSION GOAL BP (BLOOD PRESSURE) < 140/90: ICD-10-CM

## 2021-12-14 DIAGNOSIS — Z13.29 SCREENING FOR THYROID DISORDER: ICD-10-CM

## 2021-12-14 DIAGNOSIS — R42 DIZZINESS: ICD-10-CM

## 2021-12-14 DIAGNOSIS — R01.2 HEART SOUNDS, ABNORMAL: ICD-10-CM

## 2021-12-14 DIAGNOSIS — E11.9 CONTROLLED TYPE 2 DIABETES MELLITUS WITHOUT COMPLICATION, WITHOUT LONG-TERM CURRENT USE OF INSULIN (H): ICD-10-CM

## 2021-12-14 DIAGNOSIS — I49.9 IRREGULAR HEART RATE: Primary | ICD-10-CM

## 2021-12-14 DIAGNOSIS — R42 DIZZINESS: Primary | ICD-10-CM

## 2021-12-14 LAB
BASOPHILS # BLD AUTO: 0 10E3/UL (ref 0–0.2)
BASOPHILS NFR BLD AUTO: 0 %
EOSINOPHIL # BLD AUTO: 0.2 10E3/UL (ref 0–0.7)
EOSINOPHIL NFR BLD AUTO: 2 %
ERYTHROCYTE [DISTWIDTH] IN BLOOD BY AUTOMATED COUNT: 12.7 % (ref 10–15)
ERYTHROCYTE [SEDIMENTATION RATE] IN BLOOD BY WESTERGREN METHOD: 7 MM/HR (ref 0–20)
HBA1C MFR BLD: 8.2 % (ref 0–5.6)
HCT VFR BLD AUTO: 46 % (ref 40–53)
HGB BLD-MCNC: 16 G/DL (ref 13.3–17.7)
IMM GRANULOCYTES # BLD: 0 10E3/UL
IMM GRANULOCYTES NFR BLD: 0 %
LYMPHOCYTES # BLD AUTO: 2.8 10E3/UL (ref 0.8–5.3)
LYMPHOCYTES NFR BLD AUTO: 30 %
MCH RBC QN AUTO: 31.5 PG (ref 26.5–33)
MCHC RBC AUTO-ENTMCNC: 34.8 G/DL (ref 31.5–36.5)
MCV RBC AUTO: 91 FL (ref 78–100)
MONOCYTES # BLD AUTO: 0.8 10E3/UL (ref 0–1.3)
MONOCYTES NFR BLD AUTO: 9 %
NEUTROPHILS # BLD AUTO: 5.5 10E3/UL (ref 1.6–8.3)
NEUTROPHILS NFR BLD AUTO: 59 %
PLATELET # BLD AUTO: 199 10E3/UL (ref 150–450)
RBC # BLD AUTO: 5.08 10E6/UL (ref 4.4–5.9)
WBC # BLD AUTO: 9.3 10E3/UL (ref 4–11)

## 2021-12-14 PROCEDURE — U0005 INFEC AGEN DETEC AMPLI PROBE: HCPCS | Performed by: NURSE PRACTITIONER

## 2021-12-14 PROCEDURE — 85652 RBC SED RATE AUTOMATED: CPT | Performed by: NURSE PRACTITIONER

## 2021-12-14 PROCEDURE — 86140 C-REACTIVE PROTEIN: CPT | Performed by: NURSE PRACTITIONER

## 2021-12-14 PROCEDURE — U0003 INFECTIOUS AGENT DETECTION BY NUCLEIC ACID (DNA OR RNA); SEVERE ACUTE RESPIRATORY SYNDROME CORONAVIRUS 2 (SARS-COV-2) (CORONAVIRUS DISEASE [COVID-19]), AMPLIFIED PROBE TECHNIQUE, MAKING USE OF HIGH THROUGHPUT TECHNOLOGIES AS DESCRIBED BY CMS-2020-01-R: HCPCS | Performed by: NURSE PRACTITIONER

## 2021-12-14 PROCEDURE — 99207 PR NO CHARGE NURSE ONLY: CPT

## 2021-12-14 PROCEDURE — 80053 COMPREHEN METABOLIC PANEL: CPT | Performed by: NURSE PRACTITIONER

## 2021-12-14 PROCEDURE — 99214 OFFICE O/P EST MOD 30 MIN: CPT | Performed by: NURSE PRACTITIONER

## 2021-12-14 PROCEDURE — 36415 COLL VENOUS BLD VENIPUNCTURE: CPT

## 2021-12-14 PROCEDURE — 84443 ASSAY THYROID STIM HORMONE: CPT | Performed by: NURSE PRACTITIONER

## 2021-12-14 PROCEDURE — 71046 X-RAY EXAM CHEST 2 VIEWS: CPT | Mod: FY | Performed by: RADIOLOGY

## 2021-12-14 PROCEDURE — 93000 ELECTROCARDIOGRAM COMPLETE: CPT

## 2021-12-14 PROCEDURE — 85025 COMPLETE CBC W/AUTO DIFF WBC: CPT | Performed by: NURSE PRACTITIONER

## 2021-12-14 PROCEDURE — 83036 HEMOGLOBIN GLYCOSYLATED A1C: CPT

## 2021-12-14 RX ORDER — MECLIZINE HYDROCHLORIDE 25 MG/1
25 TABLET ORAL 3 TIMES DAILY PRN
Qty: 20 TABLET | Refills: 0 | Status: SHIPPED | OUTPATIENT
Start: 2021-12-14 | End: 2022-11-22

## 2021-12-14 NOTE — TELEPHONE ENCOUNTER
Called #   Telephone Information:   Mobile [phone]     Balance issue - comes and goes   Onset: Saturday morning 12/11/2021    Description:   Do you feel faint:  no   Does it feel like the surroundings (bed, room) are moving: YES- a few times   Unsteady/off balance: YES  Have you passed out or fallen: no     Intensity: moderate    Progression of Symptoms:  worsening    Accompanying Signs & Symptoms:  Heart palpitations: no   Nausea, vomiting: YES- on Saturday 12/11/2021 - chest hurts/ aches because he was dry heaving   Weakness in arms or legs: no   Fatigue: no   Vision or speech changes: no   Ringing in ears (Tinnitus): no   Hearing Loss: no     History:   Head trauma/concussion hx: no   Previous similar symptoms: YES- he was told he was dehydrated   Recent bleeding history: no     Precipitating factors:   Worse with activity or head movement: YES- when he moves his head very fast over the weekend   Any new medications (BP?): no   Alcohol/drug abuse/withdrawal: no     Alleviating factors:   Does staying in a fixed position give relief:  YES   Pt checks blood sugars daily he runs between 160-190 daily  Pt stated that he would like to get other labs done because he would like to know what is causing this     Therapies Tried and outcome: dramamine - helped a bit, drinking more fluids.      Rn advised that pt should come in for a BP check today     Patient stated an understanding and agreed with plan.    Aundrea Schneider RN, BSN  [facility] Triage

## 2021-12-14 NOTE — PATIENT INSTRUCTIONS
Patient Education     Dizziness (Vertigo) and Balance Problems: Staying Safe      Replace burned-out light bulbs to keep your home safe and well lit.   Falls or accidents can lead to pain, broken bones, a hospital stay, and a fear of future falls. Protect yourself and others by preparing for episodes. Simple steps can help you stay safe at home and wherever you go.  Lighting  Keep all areas well lit. This helps your eyes send the right signals to the brain. It also makes you less likely to trip and fall. If bright lights make symptoms worse, dim the lights or lie in a dark room until the dizziness passes. Then turn the lights back to their normal level.  Tips:    Keep a flashlight by the bed.    Place nightlights in bathrooms and hallways.    Replace burned-out bulbs. Or have someone replace them for you.  Preventing falls  To reduce your risk of falling:    Get out of bed or up from a chair slowly.    Wear low-heeled shoes that fit properly and have slip-resistant soles.    Remove throw rugs. Clear clutter from walkways.    Use handrails on stairs. Have handrails installed or adjusted if needed.    Install grab bars in the bathroom. Don't use towel racks for balance.    Use a shower stool. Also put adhesive strips in the shower or on the tub floor.  Going out  With a little time and preparation, you can get around safely.  Tips:    Bring a cane or walking aid if needed.    Give yourself plenty of time in case you start to get dizzy.    Ask your healthcare provider what type of exercise is safe for your condition.    Be patient. If an activity such as walking through a crowded shop causes you stress, you may not be ready for it yet.  Driving  If you become dizzy or disoriented while driving, you could hurt yourself and others. That's why it's best to not drive until symptoms have gone away. In some cases, your license may be temporarily held until it's safe for you to drive again.  For safety:    Ask a friend to  drive for you.    Take public transportation.    Walk to stores and other places when you can.     Don't be afraid to ask for help running errands, cooking meals, and doing exercise. Whether it's a friend, loved one, neighbor, or stranger on the street, a little help can make a world of difference. Ask your healthcare provider for a list of community resources if you need help maintaining your independence at home.  [employer] last reviewed this educational content on 1/1/2020 2000-2021 [employer]. All rights reserved. This information is not intended as a substitute for professional medical care. Always follow your healthcare professional's instructions.

## 2021-12-14 NOTE — PROGRESS NOTES
Francisco Thornton is being followed for Blood Pressure management.      BP Readings from Last 3 Encounters:   09/30/21 118/70   02/26/21 120/80   08/04/20 130/80       Wt Readings from Last 2 Encounters:   09/30/21 101.4 kg (223 lb 8 oz)   02/26/21 105.2 kg (232 lb)       Is pulse 55 or greater? - Yes    Pulse Readings from Last 3 Encounters:   09/30/21 74   02/26/21 78   08/04/20 66       Current blood pressure medication(s):  Current Outpatient Medications   Medication Sig Dispense Refill     acetaminophen (TYLENOL) 325 MG tablet Take 3 tablets (975 mg) by mouth every 8 hours 90 tablet 0     alcohol swab prep pads Use to swab area of injection/baltazar as directed. 100 each 3     aspirin 325 MG tablet take 325 mg by mouth as needed.       blood glucose (NO BRAND SPECIFIED) test strip Use to test blood sugar 1-2 times daily or as directed. To accompany: Blood Glucose Monitor Brands: per insurance. 100 strip 6     blood glucose calibration (NO BRAND SPECIFIED) solution To accompany: Blood Glucose Monitor Brands: per insurance. 1 Bottle 3     blood glucose monitoring (NO BRAND SPECIFIED) meter device kit Use to test blood sugar 1-2 times daily or as directed. Preferred blood glucose meter OR supplies to accompany: Blood Glucose Monitor Brands: per insurance. 1 kit 0     blood glucose monitoring (ONE TOUCH ULTRA 2) meter device kit USE TO TEST BLOOD SUGAR 1-2 TIMES DAILY OR AS DIRECTED. 1 kit 0     glimepiride (AMARYL) 4 MG tablet Take 2 tablets (8 mg) by mouth every morning (before breakfast) 180 tablet 1     lisinopril (ZESTRIL) 10 MG tablet Take 1 tablet (10 mg) by mouth daily 90 tablet 3     metFORMIN (GLUCOPHAGE) 1000 MG tablet Take 1 tablet (1,000 mg) by mouth 2 times daily (with meals) 180 tablet 1     pioglitazone (ACTOS) 15 MG tablet Take 1 tablet (15 mg) by mouth daily 90 tablet 1     simvastatin (ZOCOR) 40 MG tablet Take 1 tablet (40 mg) by mouth At Bedtime 90 tablet 3     thin (NO BRAND SPECIFIED) lancets Use  with lanceting device. To accompany: Blood Glucose Monitor Brands: per insurance. 100 each 11          1. Follow up instructions include:     Per PCP.      SUBJECTIVE:                                                    The patient is taking medication as prescribed and is tolerating well.   Patient is not monitoring Blood Pressure at home.     Irregular pulse, was told he has a heart murmur    Crystal been dizzy before - went away on its own - went to ER - not sure when, thinks its over a year ago.     Try to drink as much as I can.     Out of the following complicating factors: Cough, Headache, Lightheadedness, Shortness of breath, Fatigue, Nausea, Sexual Dysfunction, New onset of swelling or edema, Weakness and New onset of Chest Pain, the patient reports: Lightheadedness - moving head around makes it worse.  Dramamine helps a little.     OBJECTIVE:                                                      Today's BP completed using cuff size: regular on right side arm.      Potassium   Date Value Ref Range Status   09/30/2021 4.6 3.4 - 5.3 mmol/L Final   08/04/2020 4.2 3.4 - 5.3 mmol/L Final     Creatinine   Date Value Ref Range Status   09/30/2021 0.80 0.66 - 1.25 mg/dL Final   08/04/2020 0.84 0.66 - 1.25 mg/dL Final     Urea Nitrogen   Date Value Ref Range Status   09/30/2021 17 7 - 30 mg/dL Final   08/04/2020 13 7 - 30 mg/dL Final     GFR Estimate   Date Value Ref Range Status   09/30/2021 >90 >60 mL/min/1.73m2 Final     Comment:     As of July 11, 2021, eGFR is calculated by the CKD-EPI creatinine equation, without race adjustment. eGFR can be influenced by muscle mass, exercise, and diet. The reported eGFR is an estimation only and is only applicable if the renal function is stable.   08/04/2020 90 >60 mL/min/[1.73_m2] Final     Comment:     Non  GFR Calc  Starting 12/18/2018, serum creatinine based estimated GFR (eGFR) will be   calculated using the Chronic Kidney Disease Epidemiology Collaboration    (CKD-EPI) equation.           Education:  general discussion/verbal explanation  Ways to help improve BP/HTN:   Medications  Lose weight  Diet low in fat and rich in fruits, vegetables and low fat dairy products  Reduce salt in diet  Do something active for at least 30 minutes a day on most days of the week  Cut down on alcohol (if you drink more than 2 drinks per day)  Decrease stress (exercise, read, yoga, meditation, time for self, etc.)   Patient was given an opportunity to ask questions.    Patient verbalized understanding of this plan and is agreeable.    TIESHA ANGLIN RN

## 2021-12-14 NOTE — PROGRESS NOTES
Assessment & Plan     Dizziness  Reassuring exam no higher level of care felt to be needed.   Etiology disused.   Labs.   EKG/CXR.  COVID testing.   Stay hydrated.   Trial of antivert.  Red flag symptoms discussed and if these occur present to the emergency room or call 911.  Francisco verbalizes understanding of plan of care and is in agreement.   - Symptomatic; Yes; 12/11/2021 COVID-19 Virus (Coronavirus) by PCR Nose  - XR Chest 2 Views  - CBC with platelets and differential  - ESR: Erythrocyte sedimentation rate  - CRP, inflammation  - Comprehensive metabolic panel (BMP + Alb, Alk Phos, ALT, AST, Total. Bili, TP)  - meclizine (ANTIVERT) 25 MG tablet  Dispense: 20 tablet; Refill: 0  - CBC with platelets and differential  - ESR: Erythrocyte sedimentation rate  - CRP, inflammation  - Comprehensive metabolic panel (BMP + Alb, Alk Phos, ALT, AST, Total. Bili, TP)  - Leadless EKG Monitor 8 to 14 Days    Heart sounds, abnormal  Distant heart sounds- EKG and CXR okay.  Red flag symptoms discussed and if these occur present to the emergency room or call 911.  Francisco verbalizes understanding of plan of care and is in agreement.   - XR Chest 2 Views  - ESR: Erythrocyte sedimentation rate  - CRP, inflammation  - ESR: Erythrocyte sedimentation rate  - CRP, inflammation    Hypertension goal BP (blood pressure) < 140/90  Continue same medication this was refilled today.  Follow up with PCP within 2-4 weeks.     Screening for thyroid disorder    - TSH with free T4 reflex  - TSH with free T4 reflex      Return in about 4 weeks (around 1/11/2022) for Recheck, Blood pressure recheck.      [facility]   Demond is a 71 year old who presents for the following health issues       HPI     Here for dizziness for a nurse only blood pressure check.     This provider came in to assess.   EKG moved to room to further assess.    RN documentation.    Irregular pulse, was told he has a  heart murmur  Crystal been dizzy before - went away on its own - went to ER - not sure when, thinks its over a year ago.   Try to drink as much as I can.   Out of the following complicating factors: Cough, Headache, Lightheadedness, Shortness of breath, Fatigue, Nausea, Sexual Dysfunction, New onset of swelling or edema, Weakness and New onset of Chest Pain, the patient reports: Lightheadedness - moving head around makes it worse.  Dramamine helps a little.       Lucero chest pain or shortness of breath.   Denies illness.     Review of Systems   Constitutional, HEENT, cardiovascular, pulmonary, GI, , musculoskeletal, neuro, skin, endocrine and psych systems are negative, except as otherwise noted in the HPI.      Objective    BP (!) 140/70   Pulse 76   Temp 98.6  F (37  C)   SpO2 96%   There is no height or weight on file to calculate BMI.  Physical Exam   GENERAL: healthy, alert and no distress  EYES: Eyes grossly normal to inspection, PERRL and conjunctivae and sclerae normal  HENT: ear canals and TM's normal, nose and mouth without ulcers or lesions  NECK: no adenopathy, no asymmetry, masses, or scars and thyroid normal to palpation  RESP: lungs clear to auscultation - no rales, rhonchi or wheezes  CV: regular rate and rhythm but very distant, normal S1 S2, no S3 or S4, no murmur, click or rub, no peripheral edema and peripheral pulses strong  ABDOMEN: soft, nontender, no hepatosplenomegaly, no masses and bowel sounds normal  MS: no gross musculoskeletal defects noted, no edema  SKIN: no suspicious lesions or rashes   NEURO: Normal strength and tone, mentation intact and speech normal; neg romberg, CN 2-12 intact; no nystagmus.   PSYCH: mentation appears normal, affect normal/bright    Results for orders placed or performed in visit on 12/14/21   XR Chest 2 Views     Status: None    Narrative    EXAM: XR CHEST 2 VW  LOCATION: [facility]  DATE/TIME: 12/14/2021 5:54 PM    INDICATION:   Dizziness, Heart sounds, abnormal  COMPARISON: None.      Impression    IMPRESSION: Calcified granuloma left lung base. Otherwise negative chest.   Results for orders placed or performed in visit on 12/14/21   Symptomatic; Yes; 12/11/2021 COVID-19 Virus (Coronavirus) by PCR Nose     Status: Normal    Specimen: Nose; Swab   Result Value Ref Range    SARS CoV2 PCR Negative Negative, Testing sent to reference lab. Results will be returned via unsolicited result    Narrative    Testing was performed using the prince SARS-CoV-2 assay on the prince  Proximus0 System. This test should be ordered for the detection of  SARS-CoV-2 in individuals who meet SARS-CoV-2 clinical and/or  epidemiological criteria. Test performance is unknown in asymptomatic  patients. This test is for in vitro diagnostic use under the FDA EUA  for laboratories certified under CLIA to perform high and/or moderate  complexity testing. This test has not been FDA cleared or approved. A  negative result does not rule out the presence of PCR inhibitors in  the specimen or target RNA in concentration below the limit of  detection for the assay. The possibility of a false negative should  be considered if the patient's recent exposure or clinical  presentation suggests COVID-19. This test was validated by the [facility]. This  laboratory is certified under the Clinical Laboratory Improvement  Amendments of 1988 (CLIA-88) as qualified to perform high and/or  moderate complexity laboratory testing.   TSH with free T4 reflex     Status: Normal   Result Value Ref Range    TSH 2.06 0.40 - 4.00 mU/L   ESR: Erythrocyte sedimentation rate     Status: Normal   Result Value Ref Range    Erythrocyte Sedimentation Rate 7 0 - 20 mm/hr   CRP, inflammation     Status: Normal   Result Value Ref Range    CRP Inflammation <2.9 0.0 - 8.0 mg/L   Comprehensive metabolic panel (BMP + Alb, Alk Phos, ALT, AST, Total. Bili, TP)     Status:  Normal   Result Value Ref Range    Sodium 136 133 - 144 mmol/L    Potassium 4.1 3.4 - 5.3 mmol/L    Chloride 106 94 - 109 mmol/L    Carbon Dioxide (CO2) 23 20 - 32 mmol/L    Anion Gap 7 3 - 14 mmol/L    Urea Nitrogen 23 7 - 30 mg/dL    Creatinine 0.90 0.66 - 1.25 mg/dL    Calcium 10.1 8.5 - 10.1 mg/dL    Glucose 86 70 - 99 mg/dL    Alkaline Phosphatase 61 40 - 150 U/L    AST 28 0 - 45 U/L    ALT 57 0 - 70 U/L    Protein Total 7.8 6.8 - 8.8 g/dL    Albumin 4.0 3.4 - 5.0 g/dL    Bilirubin Total 0.7 0.2 - 1.3 mg/dL    GFR Estimate 87 >60 mL/min/1.73m2   CBC with platelets and differential     Status: None   Result Value Ref Range    WBC Count 9.3 4.0 - 11.0 10e3/uL    RBC Count 5.08 4.40 - 5.90 10e6/uL    Hemoglobin 16.0 13.3 - 17.7 g/dL    Hematocrit 46.0 40.0 - 53.0 %    MCV 91 78 - 100 fL    MCH 31.5 26.5 - 33.0 pg    MCHC 34.8 31.5 - 36.5 g/dL    RDW 12.7 10.0 - 15.0 %    Platelet Count 199 150 - 450 10e3/uL    % Neutrophils 59 %    % Lymphocytes 30 %    % Monocytes 9 %    % Eosinophils 2 %    % Basophils 0 %    % Immature Granulocytes 0 %    Absolute Neutrophils 5.5 1.6 - 8.3 10e3/uL    Absolute Lymphocytes 2.8 0.8 - 5.3 10e3/uL    Absolute Monocytes 0.8 0.0 - 1.3 10e3/uL    Absolute Eosinophils 0.2 0.0 - 0.7 10e3/uL    Absolute Basophils 0.0 0.0 - 0.2 10e3/uL    Absolute Immature Granulocytes 0.0 <=0.4 10e3/uL   CBC with platelets and differential     Status: None    Narrative    The following orders were created for panel order CBC with platelets and differential.  Procedure                               Abnormality         Status                     ---------                               -----------         ------                     CBC with platelets and d...[[id number]]                      Final result                 Please view results for these tests on the individual orders.   Results for orders placed or performed in visit on 12/14/21   Hemoglobin A1c     Status: Abnormal   Result Value Ref Range     Hemoglobin A1C 8.2 (H) 0.0 - 5.6 %

## 2021-12-15 LAB
ALBUMIN SERPL-MCNC: 4 G/DL (ref 3.4–5)
ALP SERPL-CCNC: 61 U/L (ref 40–150)
ALT SERPL W P-5'-P-CCNC: 57 U/L (ref 0–70)
ANION GAP SERPL CALCULATED.3IONS-SCNC: 7 MMOL/L (ref 3–14)
AST SERPL W P-5'-P-CCNC: 28 U/L (ref 0–45)
BILIRUB SERPL-MCNC: 0.7 MG/DL (ref 0.2–1.3)
BUN SERPL-MCNC: 23 MG/DL (ref 7–30)
CALCIUM SERPL-MCNC: 10.1 MG/DL (ref 8.5–10.1)
CHLORIDE BLD-SCNC: 106 MMOL/L (ref 94–109)
CO2 SERPL-SCNC: 23 MMOL/L (ref 20–32)
CREAT SERPL-MCNC: 0.9 MG/DL (ref 0.66–1.25)
CRP SERPL-MCNC: <2.9 MG/L (ref 0–8)
GFR SERPL CREATININE-BSD FRML MDRD: 87 ML/MIN/1.73M2
GLUCOSE BLD-MCNC: 86 MG/DL (ref 70–99)
POTASSIUM BLD-SCNC: 4.1 MMOL/L (ref 3.4–5.3)
PROT SERPL-MCNC: 7.8 G/DL (ref 6.8–8.8)
SODIUM SERPL-SCNC: 136 MMOL/L (ref 133–144)
TSH SERPL DL<=0.005 MIU/L-ACNC: 2.06 MU/L (ref 0.4–4)

## 2021-12-15 NOTE — RESULT ENCOUNTER NOTE
Note to Staff: please call the patient to explain results and to check on current symptoms.      Chest x-ray looks good no enlargement of heart.  If symptoms persist I would like him to do a Zio patch monitor.  I will order this for 2 weeks if his symptoms resolve he does not need to do; BUT if they persist I would encourage him to accept the appointment to set this up. This will give us his heart rhythm for a 2-week period of time and make sure he is not going in and out of any atrial fibrillation.    For additional lab test information, [email or web address] is an excellent reference.      Angie Romo, ANN-BC

## 2021-12-16 LAB — SARS-COV-2 RNA RESP QL NAA+PROBE: NEGATIVE

## 2021-12-16 RX ORDER — MECLIZINE HYDROCHLORIDE 25 MG/1
25 TABLET ORAL 3 TIMES DAILY PRN
COMMUNITY
Start: 2021-12-16 | End: 2022-11-22

## 2021-12-16 NOTE — RESULT ENCOUNTER NOTE
Dear Demond,    Here is a summary of your recent test results:    All of your labs are normal. If symptoms persist I would like you complete a Zio cardiac monitor to look for an arrhythmia.     For additional lab test information, [email or web address] is an excellent reference.    In addition, here is a list of due or overdue Health Maintenance reminders:    Colorectal Cancer Screening Never done  LUNG CANCER SCREENING Never done  PHQ-2 due on 01/01/2021  COVID-19 Vaccine(2 - Booster for Ruba series) due on 05/10/2021  Eye Exam due on 08/01/2021  FALL RISK ASSESSMENT due on 08/04/2021  Zoster (Shingles) Vaccine(3 of 3) due on 11/25/2021    Please call us at [phone] (or use [email or web address]) to address the above recommendations if needed.    Thank you for choosing [facility].  It was an honor and a privilege to participate in your care.       Healthy regards,    Angie Romo, ANN  [facility]

## 2021-12-20 NOTE — RESULT ENCOUNTER NOTE
Dear Demond,    Here is a summary of your recent test results:  -A1C test (average blood sugar the last 2-3 months) is above your goal.   ADVISE: making a diabetic followup appointment in 4 weeks. Please check and record your blood sugars at least 4 times daily for 1 week prior to your appointment and bring for review.  Also, you should recheck your A1C in 3 months.    For additional lab test information, [email or web address] is an excellent reference.    In addition, here is a list of due or overdue Health Maintenance reminders:  Colorectal Cancer Screening Never done  LUNG CANCER SCREENING Never done  COVID-19 Vaccine(2 - Booster for Ruba series) due on 05/10/2021  Eye Exam due on 08/01/2021    Please call us at [phone] (or use [email or web address]) to address the above recommendations if needed.           Thank you very much for trusting me and [facility].     Have a peaceful day.    Healthy regards,  Dinh Perkins MD

## 2021-12-27 NOTE — TELEPHONE ENCOUNTER
My chart message sent     Aundrea Schneider RN, BSN  [facility]

## 2022-01-02 VITALS
HEART RATE: 76 BPM | TEMPERATURE: 98.6 F | DIASTOLIC BLOOD PRESSURE: 70 MMHG | OXYGEN SATURATION: 96 % | SYSTOLIC BLOOD PRESSURE: 140 MMHG

## 2022-03-17 DIAGNOSIS — E11.9 TYPE 2 DIABETES MELLITUS WITHOUT COMPLICATION, WITHOUT LONG-TERM CURRENT USE OF INSULIN (H): ICD-10-CM

## 2022-03-18 RX ORDER — PIOGLITAZONEHYDROCHLORIDE 15 MG/1
TABLET ORAL
Qty: 90 TABLET | Refills: 0 | Status: SHIPPED | OUTPATIENT
Start: 2022-03-18 | End: 2022-03-29

## 2022-03-18 NOTE — TELEPHONE ENCOUNTER
Prescription approved per [facility] Refill Protocol.  Pt has appt on 3/29/22 for DM  Eileen SOSA RN, BSN

## 2022-03-29 ENCOUNTER — OFFICE VISIT (OUTPATIENT)
Dept: FAMILY MEDICINE | Facility: CLINIC | Age: 72
End: 2022-03-29
Payer: MEDICARE

## 2022-03-29 VITALS
BODY MASS INDEX: 35.16 KG/M2 | DIASTOLIC BLOOD PRESSURE: 78 MMHG | TEMPERATURE: 97.6 F | HEART RATE: 77 BPM | HEIGHT: 68 IN | SYSTOLIC BLOOD PRESSURE: 124 MMHG | RESPIRATION RATE: 18 BRPM | OXYGEN SATURATION: 97 % | WEIGHT: 232 LBS

## 2022-03-29 DIAGNOSIS — M79.661 PAIN OF RIGHT LOWER LEG: Primary | ICD-10-CM

## 2022-03-29 DIAGNOSIS — R25.2 LEG CRAMPS: ICD-10-CM

## 2022-03-29 DIAGNOSIS — E11.65 UNCONTROLLED TYPE 2 DIABETES MELLITUS WITH HYPERGLYCEMIA (H): ICD-10-CM

## 2022-03-29 DIAGNOSIS — S46.002A ROTATOR CUFF INJURY, LEFT, INITIAL ENCOUNTER: ICD-10-CM

## 2022-03-29 DIAGNOSIS — E11.9 TYPE 2 DIABETES MELLITUS WITHOUT COMPLICATION, WITHOUT LONG-TERM CURRENT USE OF INSULIN (H): ICD-10-CM

## 2022-03-29 LAB — HBA1C MFR BLD: 7.2 % (ref 0–5.6)

## 2022-03-29 PROCEDURE — 83735 ASSAY OF MAGNESIUM: CPT | Performed by: FAMILY MEDICINE

## 2022-03-29 PROCEDURE — 80048 BASIC METABOLIC PNL TOTAL CA: CPT | Performed by: FAMILY MEDICINE

## 2022-03-29 PROCEDURE — 99214 OFFICE O/P EST MOD 30 MIN: CPT | Performed by: FAMILY MEDICINE

## 2022-03-29 PROCEDURE — 36415 COLL VENOUS BLD VENIPUNCTURE: CPT | Performed by: FAMILY MEDICINE

## 2022-03-29 PROCEDURE — 83036 HEMOGLOBIN GLYCOSYLATED A1C: CPT | Performed by: FAMILY MEDICINE

## 2022-03-29 PROCEDURE — 82043 UR ALBUMIN QUANTITATIVE: CPT | Performed by: FAMILY MEDICINE

## 2022-03-29 RX ORDER — GLIMEPIRIDE 4 MG/1
8 TABLET ORAL
Qty: 180 TABLET | Refills: 1 | Status: SHIPPED | OUTPATIENT
Start: 2022-03-29 | End: 2022-09-27

## 2022-03-29 RX ORDER — PIOGLITAZONEHYDROCHLORIDE 15 MG/1
15 TABLET ORAL DAILY
Qty: 90 TABLET | Refills: 1 | Status: SHIPPED | OUTPATIENT
Start: 2022-03-29 | End: 2022-11-22

## 2022-03-29 RX ORDER — HYDROCODONE BITARTRATE AND ACETAMINOPHEN 5; 325 MG/1; MG/1
1 TABLET ORAL EVERY 6 HOURS PRN
Qty: 40 TABLET | Refills: 0 | Status: SHIPPED | OUTPATIENT
Start: 2022-03-29

## 2022-03-29 NOTE — PROGRESS NOTES
"  Assessment & Plan   controlled type 2 diabetes mellitus with hyperglycemia (H)  Type 2 diabetes mellitus without complication, without long-term current use of insulin (H)  controlled,, continue Metformin and glimepiride and pioglitazone  - Albumin Random Urine Quantitative with Creat Ratio  - glimepiride (AMARYL) 4 MG tablet  Dispense: 180 tablet; Refill: 1  - Hemoglobin A1c  - Albumin Random Urine Quantitative with Creat Ratio  - Hemoglobin A1c  - pioglitazone (ACTOS) 15 MG tablet  Dispense: 90 tablet; Refill: 1  - metFORMIN (GLUCOPHAGE) 1000 MG tablet  Dispense: 180 tablet; Refill: 1    Pain of right lower leg  Leg cramps  Stretching no signs of DVT.  Should be seen if any swelling or pressure symptoms of the left.  - Magnesium  - Basic metabolic panel  (Ca, Cl, CO2, Creat, Gluc, K, Na, BUN)  - Magnesium  - Basic metabolic panel  (Ca, Cl, CO2, Creat, Gluc, K, Na, BUN)    Rotator cuff injury, left, initial encounter  History and requesting refill:  - HYDROcodone-acetaminophen (NORCO) 5-325 MG tablet  Dispense: 40 tablet; Refill: 0      BMI:   Estimated body mass index is 35.28 kg/m  as calculated from the following:    Height as of this encounter: 1.727 m (5' 8\").    Weight as of this encounter: 105.2 kg (232 lb).   Weight management plan: Discussed healthy diet and exercise guidelines      Return in about 3 months (around 6/29/2022) for medication recheck.      Dinh Perkins MD      [address] [address]  [email or web address]   Office: [phone]       Lulú Lagos is a 72 year old who presents for the following health issues     HPI     Diabetes Follow-up    How often are you checking your blood sugar? One time daily  What time of day are you checking your blood sugars (select all that apply)?  Before meals  Have you had any blood sugars above 200?  No  Have you had any blood sugars below 70?  No    What symptoms do you notice when your blood sugar is " low?  None    What concerns do you have today about your diabetes? None     Do you have any of these symptoms? (Select all that apply)  Numbness in feet and Burning in feet    Have you had a diabetic eye exam in the last 12 months? Yes- Date of last eye exam: Not sure ,  Location: [facility] [place]     X 2 days ago, right leg pain, ongoing cici horse. No injury.       Hyperlipidemia Follow-Up      Are you regularly taking any medication or supplement to lower your cholesterol?   Yes- simvastatin    Are you having muscle aches or other side effects that you think could be caused by your cholesterol lowering medication?  No    Hypertension Follow-up      Do you check your blood pressure regularly outside of the clinic? No     Are you following a low salt diet? No    Are your blood pressures ever more than 140 on the top number (systolic) OR more   than 90 on the bottom number (diastolic), for example 140/90? No    BP Readings from Last 2 Encounters:   03/29/22 124/78   12/14/21 (!) 140/70     Hemoglobin A1C POCT (%)   Date Value   05/26/2021 7.3 (H)   02/26/2021 9.2 (H)     Hemoglobin A1C (%)   Date Value   03/29/2022 7.2 (H)   12/14/2021 8.2 (H)     LDL Cholesterol Calculated (mg/dL)   Date Value   09/30/2021 120 (H)   08/04/2020 91   09/12/2019 82         How many servings of fruits and vegetables do you eat daily?  0-1    On average, how many sweetened beverages do you drink each day (Examples: soda, juice, sweet tea, etc.  Do NOT count diet or artificially sweetened beverages)?   0    How many days per week do you exercise enough to make your heart beat faster? 3 or less    How many minutes a day do you exercise enough to make your heart beat faster? 9 or less    How many days per week do you miss taking your medication? 0    Review of Systems   Constitutional, HEENT, cardiovascular, pulmonary, gi and gu systems are negative, except as otherwise noted.      Objective    /78   Pulse 77   Temp 97.6  " F (36.4  C)   Resp 18   Ht 1.727 m (5' 8\")   Wt 105.2 kg (232 lb)   SpO2 97%   BMI 35.28 kg/m    Body mass index is 35.28 kg/m .  Physical Exam   GENERAL: healthy, alert and no distress  HENT: ear canals and TM's normal, nose and mouth without ulcers or lesions  NECK: no adenopathy, no asymmetry, masses, or scars and thyroid normal to palpation  RESP: lungs clear to auscultation - no rales, rhonchi or wheezes  CV: regular rate and rhythm, normal S1 S2, no S3 or S4, no murmur, click or rub, no peripheral edema and peripheral pulses strong  ABDOMEN: soft, nontender, no hepatosplenomegaly, no masses and bowel sounds normal  MS: no gross musculoskeletal defects noted, no edema  SKIN: no suspicious lesions or rashes  NEURO: Normal strength and tone, mentation intact and speech normal  Diabetic foot exam: normal DP and PT pulses, no trophic changes or ulcerative lesions and normal sensory exam    Results for orders placed or performed in visit on 03/29/22   Albumin Random Urine Quantitative with Creat Ratio     Status: None   Result Value Ref Range    Creatinine Urine mg/dL 37 mg/dL    Albumin Urine mg/L 5 mg/L    Albumin Urine mg/g Cr 13.51 0.00 - 17.00 mg/g Cr   Hemoglobin A1c     Status: Abnormal   Result Value Ref Range    Hemoglobin A1C 7.2 (H) 0.0 - 5.6 %   Magnesium     Status: Normal   Result Value Ref Range    Magnesium 1.8 1.6 - 2.3 mg/dL   Basic metabolic panel  (Ca, Cl, CO2, Creat, Gluc, K, Na, BUN)     Status: Abnormal   Result Value Ref Range    Sodium 137 133 - 144 mmol/L    Potassium 4.5 3.4 - 5.3 mmol/L    Chloride 107 94 - 109 mmol/L    Carbon Dioxide (CO2) 27 20 - 32 mmol/L    Anion Gap 3 3 - 14 mmol/L    Urea Nitrogen 15 7 - 30 mg/dL    Creatinine 1.03 0.66 - 1.25 mg/dL    Calcium 10.4 (H) 8.5 - 10.1 mg/dL    Glucose 81 70 - 99 mg/dL    GFR Estimate 78 >60 mL/min/1.73m2                 "

## 2022-03-31 LAB
ANION GAP SERPL CALCULATED.3IONS-SCNC: 3 MMOL/L (ref 3–14)
BUN SERPL-MCNC: 15 MG/DL (ref 7–30)
CALCIUM SERPL-MCNC: 10.4 MG/DL (ref 8.5–10.1)
CHLORIDE BLD-SCNC: 107 MMOL/L (ref 94–109)
CO2 SERPL-SCNC: 27 MMOL/L (ref 20–32)
CREAT SERPL-MCNC: 1.03 MG/DL (ref 0.66–1.25)
CREAT UR-MCNC: 37 MG/DL
GFR SERPL CREATININE-BSD FRML MDRD: 78 ML/MIN/1.73M2
GLUCOSE BLD-MCNC: 81 MG/DL (ref 70–99)
MAGNESIUM SERPL-MCNC: 1.8 MG/DL (ref 1.6–2.3)
MICROALBUMIN UR-MCNC: 5 MG/L
MICROALBUMIN/CREAT UR: 13.51 MG/G CR (ref 0–17)
POTASSIUM BLD-SCNC: 4.5 MMOL/L (ref 3.4–5.3)
SODIUM SERPL-SCNC: 137 MMOL/L (ref 133–144)

## 2022-03-31 NOTE — RESULT ENCOUNTER NOTE
Dear Demond,    Here is a summary of your recent test results:  -Kidney function (GFR) is normal.  -Sodium is normal.  -Potassium is normal.  -Calcium is elevated.  ADVISE: rechecking this in 1 month.  -Glucose is elevated due to your diabetes.  -A1C (test of diabetes control the last 2-3 months) is at your goal. Please continue with your current plan. Also, you should make an appointment to see me and recheck your A1C test in 6 months.   -Microalbumin (urine protein) test is normal.  ADVISE: rechecking this annually.  -Magnesium level is normal.    For additional lab test information, [email or web address] is a very good reference.           Thank you very much for trusting me and [facility].     Have a peaceful day.    Healthy regards,  Dinh Perkins MD

## 2022-09-24 DIAGNOSIS — E11.9 TYPE 2 DIABETES MELLITUS WITHOUT COMPLICATION, WITHOUT LONG-TERM CURRENT USE OF INSULIN (H): ICD-10-CM

## 2022-09-24 NOTE — LETTER
October 6, 2022      Francisco CHATMAN Hillary  [address] [address] [address]        We have been calling you regarding a recent refill request we received for Metformin.  Unfortunately, we were unable to reach you.  We are notifying you that you are due for diabetic med check prior to your next refill.  You can schedule this appointment via [employer] or by calling the clinic at [phone] Option 1.            Sincerely,        Geovany Perkins MD

## 2022-09-27 NOTE — TELEPHONE ENCOUNTER
Prescription approved per [facility] Refill Protocol.  Alyssa VILLAGRAN needs DM visit     Team please call to schedule as above     Arelis Benavidez RN

## 2022-09-29 ENCOUNTER — TELEPHONE (OUTPATIENT)
Dept: FAMILY MEDICINE | Facility: CLINIC | Age: 72
End: 2022-09-29

## 2022-09-29 NOTE — TELEPHONE ENCOUNTER
Reason for Call:  Form, our goal is to have forms completed with 72 hours, however, some forms may require a visit or additional information.    Type of letter, form or note:  medical    Who is the form from?: [facility] (if other please explain)    Where did the form come from: form was faxed in    What clinic location was the form placed at?: [facility]    Where the form was placed: Dr. Perkins Box/Folder    What number is listed as a contact on the form?: [phone]    Call taken on 9/29/2022 at 9:20 AM by Aspen Caicedo

## 2022-10-12 DIAGNOSIS — E11.42 DIABETIC POLYNEUROPATHY ASSOCIATED WITH TYPE 2 DIABETES MELLITUS (H): ICD-10-CM

## 2022-10-12 RX ORDER — BLOOD SUGAR DIAGNOSTIC
STRIP MISCELLANEOUS
Qty: 100 STRIP | Refills: 3 | Status: SHIPPED | OUTPATIENT
Start: 2022-10-12 | End: 2022-11-22

## 2022-10-12 NOTE — TELEPHONE ENCOUNTER
Prescription approved per [facility] Refill Protocol.    eLtitia HIGH RN

## 2022-10-13 DIAGNOSIS — E78.5 HYPERLIPIDEMIA LDL GOAL <70: ICD-10-CM

## 2022-10-13 NOTE — TELEPHONE ENCOUNTER
Routing refill request to provider for review/approval because:  Labs not current:  ldl  Eileen SOSA RN, BSN

## 2022-10-15 RX ORDER — SIMVASTATIN 40 MG
TABLET ORAL
Qty: 90 TABLET | Refills: 3 | Status: SHIPPED | OUTPATIENT
Start: 2022-10-15 | End: 2022-11-22

## 2022-11-20 ENCOUNTER — HEALTH MAINTENANCE LETTER (OUTPATIENT)
Age: 72
End: 2022-11-20

## 2022-11-22 ENCOUNTER — OFFICE VISIT (OUTPATIENT)
Dept: FAMILY MEDICINE | Facility: CLINIC | Age: 72
End: 2022-11-22
Payer: MEDICARE

## 2022-11-22 VITALS
BODY MASS INDEX: 37.1 KG/M2 | WEIGHT: 244 LBS | TEMPERATURE: 98 F | HEART RATE: 75 BPM | DIASTOLIC BLOOD PRESSURE: 66 MMHG | RESPIRATION RATE: 14 BRPM | OXYGEN SATURATION: 96 % | SYSTOLIC BLOOD PRESSURE: 128 MMHG

## 2022-11-22 DIAGNOSIS — Z12.5 SCREENING FOR PROSTATE CANCER: ICD-10-CM

## 2022-11-22 DIAGNOSIS — R35.1 NOCTURIA: ICD-10-CM

## 2022-11-22 DIAGNOSIS — N40.1 BENIGN PROSTATIC HYPERPLASIA WITH LOWER URINARY TRACT SYMPTOMS, SYMPTOM DETAILS UNSPECIFIED: ICD-10-CM

## 2022-11-22 DIAGNOSIS — E11.9 CONTROLLED TYPE 2 DIABETES MELLITUS WITHOUT COMPLICATION, WITHOUT LONG-TERM CURRENT USE OF INSULIN (H): ICD-10-CM

## 2022-11-22 DIAGNOSIS — E78.5 HYPERLIPIDEMIA LDL GOAL <70: ICD-10-CM

## 2022-11-22 DIAGNOSIS — Z00.00 ENCOUNTER FOR MEDICARE ANNUAL WELLNESS EXAM: Primary | ICD-10-CM

## 2022-11-22 DIAGNOSIS — I10 HYPERTENSION GOAL BP (BLOOD PRESSURE) < 130/80: ICD-10-CM

## 2022-11-22 DIAGNOSIS — Z12.11 SCREEN FOR COLON CANCER: ICD-10-CM

## 2022-11-22 DIAGNOSIS — E11.9 TYPE 2 DIABETES MELLITUS WITHOUT COMPLICATION, WITHOUT LONG-TERM CURRENT USE OF INSULIN (H): ICD-10-CM

## 2022-11-22 PROCEDURE — 99214 OFFICE O/P EST MOD 30 MIN: CPT | Mod: 25 | Performed by: FAMILY MEDICINE

## 2022-11-22 PROCEDURE — G0439 PPPS, SUBSEQ VISIT: HCPCS | Performed by: FAMILY MEDICINE

## 2022-11-22 RX ORDER — PIOGLITAZONEHYDROCHLORIDE 15 MG/1
15 TABLET ORAL DAILY
Qty: 90 TABLET | Refills: 3 | Status: SHIPPED | OUTPATIENT
Start: 2022-11-22 | End: 2022-12-15

## 2022-11-22 RX ORDER — GLIMEPIRIDE 4 MG/1
8 TABLET ORAL
Qty: 180 TABLET | Refills: 3 | Status: SHIPPED | OUTPATIENT
Start: 2022-11-22 | End: 2024-01-11

## 2022-11-22 RX ORDER — PIOGLITAZONEHYDROCHLORIDE 15 MG/1
TABLET ORAL
Qty: 90 TABLET | Refills: 1 | OUTPATIENT
Start: 2022-11-22

## 2022-11-22 RX ORDER — LISINOPRIL 10 MG/1
10 TABLET ORAL DAILY
Qty: 90 TABLET | Refills: 3 | Status: SHIPPED | OUTPATIENT
Start: 2022-11-22 | End: 2024-01-11

## 2022-11-22 RX ORDER — SIMVASTATIN 40 MG
40 TABLET ORAL AT BEDTIME
Qty: 90 TABLET | Refills: 3 | Status: SHIPPED | OUTPATIENT
Start: 2022-11-22 | End: 2024-01-11

## 2022-11-22 RX ORDER — PIOGLITAZONEHYDROCHLORIDE 15 MG/1
15 TABLET ORAL DAILY
Qty: 90 TABLET | Refills: 1 | Status: CANCELLED | OUTPATIENT
Start: 2022-11-22

## 2022-11-22 NOTE — PROGRESS NOTES
SUBJECTIVE:   Demond is a 72 year old who presents for Preventive Visit.  Patient has been advised of split billing requirements and indicates understanding: Yes  Are you in the first 12 months of your Medicare coverage?  No    HPI    Have you ever done Advance Care Planning? (For example, a Health Directive, POLST, or a discussion with a medical provider or your loved ones about your wishes): No, advance care planning information given to patient to review.  Patient declined advance care planning discussion at this time.    Fall risk  Fallen 2 or more times in the past year?: No  Any fall with injury in the past year?: No    Cognitive Screening   1) Repeat 3 items (Leader, Season, Table)    2) Clock draw: NORMAL  3) 3 item recall: Recalls 3 objects  Results: 3 items recalled: COGNITIVE IMPAIRMENT LESS LIKELY    Mini-CogTM Copyright S Kerry. Licensed by the author for use in [facility] [employer]; reprinted with permission (fuentes@[facility]). All rights reserved.      Do you have sleep apnea, excessive snoring or daytime drowsiness?: yes    Reviewed and updated as needed this visit by clinical staff   Tobacco  Allergies  Meds  Problems  Med Hx  Surg Hx  Fam Hx        Reviewed and updated as needed this visit by Provider   Tobacco  Allergies  Meds  Problems  Med Hx  Surg Hx  Fam Hx         Social History     Tobacco Use     Smoking status: Former     Packs/day: 1.00     Years: 25.00     Pack years: 25.00     Types: Cigarettes     Quit date: 6/1/2012     Years since quitting: 10.4     Smokeless tobacco: Never     Tobacco comments:     1 1/2 PPD   Substance Use Topics     Alcohol use: No     If you drink alcohol do you typically have >3 drinks per day or >7 drinks per week? No    No flowsheet data found.      Diabetes Follow-up      How often are you checking your blood sugar? Not at all    What concerns do you have today about your diabetes? None and Other: Not able to afford the correct medication that  keeps his numbers good.      Do you have any of these symptoms? (Select all that apply)  Numbness in feet and Burning in feet    Have you had a diabetic eye exam in the last 12 months? Yes, patient thinks so    Hyperlipidemia Follow-Up      Are you regularly taking any medication or supplement to lower your cholesterol?   Yes- simvastatin (ZOCOR) 40 MG tablet    Are you having muscle aches or other side effects that you think could be caused by your cholesterol lowering medication?  No    Hypertension Follow-up      Do you check your blood pressure regularly outside of the clinic? No     Are you following a low salt diet? No    Are your blood pressures ever more than 140 on the top number (systolic) OR more   than 90 on the bottom number (diastolic), for example 140/90? n/a    BP Readings from Last 2 Encounters:   11/22/22 128/66   03/29/22 124/78     Hemoglobin A1C (%)   Date Value   03/29/2022 7.2 (H)   12/14/2021 8.2 (H)   05/26/2021 7.3 (H)   02/26/2021 9.2 (H)     LDL Cholesterol Calculated (mg/dL)   Date Value   09/30/2021 120 (H)   08/04/2020 91   09/12/2019 82       Current providers sharing in care for this patient include:   Patient Care Team:  Geovany Perkins MD as PCP - General (Family Practice)  Geovany Perkins MD as Assigned PCP  Yen Lawson RD as Diabetes Educator (Dietitian, Registered)    The following health maintenance items are reviewed in Epic and correct as of today:  Health Maintenance   Topic Date Due     EYE EXAM  08/01/2021     INFLUENZA VACCINE (1) 09/01/2022     A1C  09/29/2022     LIPID  09/30/2022     PSA  09/30/2022     ZOSTER IMMUNIZATION (3 of 3) 11/25/2021     CMP  12/14/2022     CBC  12/14/2022     BMP  03/29/2023     MICROALBUMIN  03/29/2023     DIABETIC FOOT EXAM  03/29/2023     ANNUAL REVIEW OF HM ORDERS  03/29/2023     MEDICARE ANNUAL WELLNESS VISIT  11/22/2023     FALL RISK ASSESSMENT  11/22/2023     ADVANCE CARE PLANNING  11/22/2027     DTAP/TDAP/TD IMMUNIZATION (3 -  "Td or Tdap) 02/12/2028     HEPATITIS C SCREENING  Completed     PHQ-2 (once per calendar year)  Completed     AORTIC ANEURYSM SCREENING (SYSTEM ASSIGNED)  Completed     IPV IMMUNIZATION  Aged Out     MENINGITIS IMMUNIZATION  Aged Out     Pneumococcal Vaccine: 65+ Years  Discontinued     COLORECTAL CANCER SCREENING  Discontinued     LUNG CANCER SCREENING  Discontinued     COVID-19 Vaccine  Discontinued     Review of Systems   Constitutional, HEENT, cardiovascular, pulmonary, GI, , musculoskeletal, neuro, skin, endocrine and psych systems are negative, except as otherwise noted.    OBJECTIVE:   /66 (BP Location: Left arm, Patient Position: Sitting, Cuff Size: Adult Regular)   Pulse 75   Temp 98  F (36.7  C) (Tympanic)   Resp 14   Wt 110.7 kg (244 lb)   SpO2 96%   BMI 37.10 kg/m   Estimated body mass index is 37.1 kg/m  as calculated from the following:    Height as of 3/29/22: 1.727 m (5' 8\").    Weight as of this encounter: 110.7 kg (244 lb).  EXAM:   GENERAL: healthy, alert and no distress  EYES: Eyes grossly normal to inspection, PERRL and conjunctivae and sclerae normal  HENT: ear canals and TM's normal, nose and mouth without ulcers or lesions  NECK: no adenopathy, no asymmetry, masses, or scars and thyroid normal to palpation  RESP: lungs clear to auscultation - no rales, rhonchi or wheezes  BREAST: normal without masses, tenderness or nipple discharge and no palpable axillary masses or adenopathy  CV: regular rate and rhythm, normal S1 S2, no S3 or S4, no murmur, click or rub, no peripheral edema and peripheral pulses strong  ABDOMEN: soft, nontender, no hepatosplenomegaly, no masses and bowel sounds normal   (male): normal male genitalia without lesions or urethral discharge, no hernia  MS: no gross musculoskeletal defects noted, no edema  SKIN: no suspicious lesions or rashes  NEURO: Normal strength and tone, mentation intact and speech normal  PSYCH: mentation appears normal, affect " "normal/bright  LYMPH: no cervical, supraclavicular, axillary, or inguinal adenopathy  RECTAL: declined exam  Diabetic foot exam: normal DP and PT pulses, no trophic changes or ulcerative lesions and normal sensory exam      ASSESSMENT / PLAN:   Encounter for Medicare annual wellness exam      Controlled type 2 diabetes mellitus without complication, without long-term current use of insulin (H)  Previous controlled and will follow-up for labs fasting in the near future.  - HEMOGLOBIN A1C  - Lipid panel reflex to direct LDL Non-fasting  - metFORMIN (GLUCOPHAGE) 1000 MG tablet  Dispense: 180 tablet; Refill: 3  - glimepiride (AMARYL) 4 MG tablet  Dispense: 180 tablet; Refill: 3  - pioglitazone (ACTOS) 15 MG tablet  Dispense: 90 tablet; Refill: 3  - Adult Eye  Referral    Benign prostatic hyperplasia with lower urinary tract symptoms, symptom details unspecified  - PROSTATE SPEC ANTIGEN SCREEN    Hypertension goal BP (blood pressure) < 130/80  Controlled - continue medication.   - COMPREHENSIVE METABOLIC PANEL  - CBC with Platelets  - lisinopril (ZESTRIL) 10 MG tablet  Dispense: 90 tablet; Refill: 3     Hyperlipidemia LDL goal <70  Controlled - continue medication.   - simvastatin (ZOCOR) 40 MG tablet  Dispense: 90 tablet; Refill: 3    Screen for colon cancer  declined    Screening for prostate cancer  - PROSTATE SPEC ANTIGEN SCREEN      End of Life Planning:  Patient currently has an advanced directive: No.  I have verified the patient's ablity to prepare an advanced directive/make health care decisions.  Literature was provided to assist patient in preparing an advanced directive.    COUNSELING:  Reviewed preventive health counseling, as reflected in patient instructions    Estimated body mass index is 37.1 kg/m  as calculated from the following:    Height as of 3/29/22: 1.727 m (5' 8\").    Weight as of this encounter: 110.7 kg (244 lb).  Weight management plan: Discussed healthy diet and exercise " guidelines     reports that he quit smoking about 10 years ago. His smoking use included cigarettes. He has a 25.00 pack-year smoking history. He has never used smokeless tobacco.      Appropriate preventive services were discussed with this patient, including applicable screening as appropriate for cardiovascular disease, diabetes, osteopenia/osteoporosis, and glaucoma.  As appropriate for age/gender, discussed screening for colorectal cancer, prostate cancer, breast cancer, and cervical cancer. Checklist reviewing preventive services available has been given to the patient.    Reviewed patients plan of care and provided an AVS. The Basic Care Plan (routine screening as documented in Health Maintenance) for Francisco meets the Care Plan requirement. This Care Plan has been established and reviewed with the Patient.    Return in about 53 weeks (around 11/28/2023) for Annual Wellness Visit.           Dinh Perkins MD     [address] [address]  [email or web address]     Office: [phone]       Identified Health Risks:

## 2022-11-22 NOTE — PATIENT INSTRUCTIONS
Patient Education   Personalized Prevention Plan  You are due for the preventive services outlined below.  Your care team is available to assist you in scheduling these services.  If you have already completed any of these items, please share that information with your care team to update in your medical record.  Health Maintenance Due   Topic Date Due     Colorectal Cancer Screening  Never done     LUNG CANCER SCREENING  Never done     Pneumococcal Vaccine (3 - PPSV23 if available, else PCV20) 07/12/2021     Eye Exam  08/01/2021     COVID-19 Vaccine (3 - Booster for Ruba series) 04/05/2022     Flu Vaccine (1) 09/01/2022     A1C Lab  09/29/2022     Cholesterol Lab  09/30/2022     Prostate Test  09/30/2022     Zoster (Shingles) Vaccine (3 of 3) 11/25/2021     Comprehensive Metabolic Panel  12/14/2022     Complete Blood Count  12/14/2022

## 2022-12-07 ENCOUNTER — LAB (OUTPATIENT)
Dept: LAB | Facility: CLINIC | Age: 72
End: 2022-12-07
Payer: MEDICARE

## 2022-12-07 DIAGNOSIS — E11.9 CONTROLLED TYPE 2 DIABETES MELLITUS WITHOUT COMPLICATION, WITHOUT LONG-TERM CURRENT USE OF INSULIN (H): Primary | ICD-10-CM

## 2022-12-07 DIAGNOSIS — Z12.5 SCREENING FOR PROSTATE CANCER: ICD-10-CM

## 2022-12-07 DIAGNOSIS — I10 HYPERTENSION GOAL BP (BLOOD PRESSURE) < 130/80: ICD-10-CM

## 2022-12-07 LAB
ALBUMIN SERPL BCG-MCNC: 4.1 G/DL (ref 3.5–5.2)
ALP SERPL-CCNC: 73 U/L (ref 40–129)
ALT SERPL W P-5'-P-CCNC: 33 U/L (ref 10–50)
ANION GAP SERPL CALCULATED.3IONS-SCNC: 11 MMOL/L (ref 7–15)
AST SERPL W P-5'-P-CCNC: 33 U/L (ref 10–50)
BILIRUB SERPL-MCNC: 0.3 MG/DL
BUN SERPL-MCNC: 17.8 MG/DL (ref 8–23)
CALCIUM SERPL-MCNC: 9.1 MG/DL (ref 8.8–10.2)
CHLORIDE SERPL-SCNC: 102 MMOL/L (ref 98–107)
CHOLEST SERPL-MCNC: 168 MG/DL
CREAT SERPL-MCNC: 0.99 MG/DL (ref 0.67–1.17)
DEPRECATED HCO3 PLAS-SCNC: 24 MMOL/L (ref 22–29)
ERYTHROCYTE [DISTWIDTH] IN BLOOD BY AUTOMATED COUNT: 12.9 % (ref 10–15)
GFR SERPL CREATININE-BSD FRML MDRD: 82 ML/MIN/1.73M2
GLUCOSE SERPL-MCNC: 246 MG/DL (ref 70–99)
HBA1C MFR BLD: 8.7 % (ref 0–5.6)
HCT VFR BLD AUTO: 44.3 % (ref 40–53)
HDLC SERPL-MCNC: 32 MG/DL
HGB BLD-MCNC: 15.3 G/DL (ref 13.3–17.7)
LDLC SERPL CALC-MCNC: 100 MG/DL
MCH RBC QN AUTO: 30.8 PG (ref 26.5–33)
MCHC RBC AUTO-ENTMCNC: 34.5 G/DL (ref 31.5–36.5)
MCV RBC AUTO: 89 FL (ref 78–100)
NONHDLC SERPL-MCNC: 136 MG/DL
PLATELET # BLD AUTO: 187 10E3/UL (ref 150–450)
POTASSIUM SERPL-SCNC: 4.9 MMOL/L (ref 3.4–5.3)
PROT SERPL-MCNC: 6.8 G/DL (ref 6.4–8.3)
PSA SERPL-MCNC: 1.34 NG/ML (ref 0–6.5)
RBC # BLD AUTO: 4.97 10E6/UL (ref 4.4–5.9)
SODIUM SERPL-SCNC: 137 MMOL/L (ref 136–145)
TRIGL SERPL-MCNC: 181 MG/DL
WBC # BLD AUTO: 5.5 10E3/UL (ref 4–11)

## 2022-12-07 PROCEDURE — 85027 COMPLETE CBC AUTOMATED: CPT

## 2022-12-07 PROCEDURE — 80061 LIPID PANEL: CPT

## 2022-12-07 PROCEDURE — 80053 COMPREHEN METABOLIC PANEL: CPT

## 2022-12-07 PROCEDURE — G0103 PSA SCREENING: HCPCS

## 2022-12-07 PROCEDURE — 36415 COLL VENOUS BLD VENIPUNCTURE: CPT

## 2022-12-07 PROCEDURE — 83036 HEMOGLOBIN GLYCOSYLATED A1C: CPT

## 2022-12-07 NOTE — LETTER
January 2, 2023      Demond Thornton  [address] [address] [facility] [address]        Dear ,     Here is a summary of your recent test results:    -Normal red blood cell (hgb) levels, normal white blood cell count and normal platelet levels.  -PSA (prostate specific antigen) test is normal.  This indicates a low likelihood of prostate cancer.  ADVISE: rechecking this in 1 year.  -Cholesterol levels are at your goal levels.  ADVISE: continuing your medication, a regular exercise program with at least 150 minutes of aerobic exercise per week, and eating a low saturated fat/low carbohydrate diet.  Also, you should recheck this fasting cholesterol panel in 12 months.  -Liver and gallbladder tests (ALT,AST, Alk phos,bilirubin) are normal.  -Kidney function (GFR) is normal.  -Sodium is normal.  -Potassium is normal.  -Calcium is normal.  -Glucose is elevated due to your diabetes.  -A1C test (average blood sugar the last 2-3 months) is above your goal (less than 7.)   ADVISE: Increasing pioglitazone dose to 30 mg daily (he can take two the 15 mg tabs in the morning and I have sent in 30 mg tabs to fill once you run out), making a diabetic followup appointment in 3 months. Please check and record your blood sugars at least 4 times daily for 1 week prior to your appointment and bring for review.  Also, you should recheck your A1C in 3 months.     For additional lab test information, www.[email or web address].com is a very good reference.     In addition, here is a list of due or overdue Health Maintenance reminders:  Eye Exam due on 08/01/2021  Zoster (Shingles) Vaccine(3 of 3) due on 11/25/2021     Please call us at [phone] (or use [email or web address]) to address the above recommendations if needed.         Thank you very much for trusting me and [facility].      Have a peaceful day.     Healthy regards,  Dinh Perkins MD    Resulted Orders   HEMOGLOBIN A1C   Result Value Ref Range    Hemoglobin A1C 8.7 (H) 0.0 - 5.6  %   Lipid panel reflex to direct LDL Non-fasting   Result Value Ref Range    Cholesterol 168 <200 mg/dL    Triglycerides 181 (H) <150 mg/dL    Direct Measure HDL 32 (L) >=40 mg/dL    LDL Cholesterol Calculated 100 <=100 mg/dL    Non HDL Cholesterol 136 (H) <130 mg/dL    Narrative    Cholesterol  Desirable:  <200 mg/dL    Triglycerides  Normal:  Less than 150 mg/dL  Borderline High:  150-199 mg/dL  High:  200-499 mg/dL  Very High:  Greater than or equal to 500 mg/dL    Direct Measure HDL  Female:  Greater than or equal to 50 mg/dL   Male:  Greater than or equal to 40 mg/dL    LDL Cholesterol  Desirable:  <100mg/dL  Above Desirable:  100-129 mg/dL   Borderline High:  130-159 mg/dL   High:  160-189 mg/dL   Very High:  >= 190 mg/dL    Non HDL Cholesterol  Desirable:  130 mg/dL  Above Desirable:  130-159 mg/dL  Borderline High:  160-189 mg/dL  High:  190-219 mg/dL  Very High:  Greater than or equal to 220 mg/dL   PROSTATE SPEC ANTIGEN SCREEN   Result Value Ref Range    Prostate Specific Antigen Screen 1.34 0.00 - 6.50 ng/mL    Narrative    This result is obtained using the Roche Elecsys total PSA method on the prince e801 immunoassay analyzer. Results obtained with different assay methods or kits cannot be used interchangeably.   COMPREHENSIVE METABOLIC PANEL   Result Value Ref Range    Sodium 137 136 - 145 mmol/L    Potassium 4.9 3.4 - 5.3 mmol/L    Chloride 102 98 - 107 mmol/L    Carbon Dioxide (CO2) 24 22 - 29 mmol/L    Anion Gap 11 7 - 15 mmol/L    Urea Nitrogen 17.8 8.0 - 23.0 mg/dL    Creatinine 0.99 0.67 - 1.17 mg/dL    Calcium 9.1 8.8 - 10.2 mg/dL    Glucose 246 (H) 70 - 99 mg/dL    Alkaline Phosphatase 73 40 - 129 U/L    AST 33 10 - 50 U/L    ALT 33 10 - 50 U/L    Protein Total 6.8 6.4 - 8.3 g/dL    Albumin 4.1 3.5 - 5.2 g/dL    Bilirubin Total 0.3 <=1.2 mg/dL    GFR Estimate 82 >60 mL/min/1.73m2      Comment:      Effective December 21, 2021 eGFRcr in adults is calculated using the 2021 CKD-EPI creatinine  equation which includes age and gender (Jovany perrin al., NEJ, DOI: 10.1056/RUHKjq1182582)   CBC with Platelets   Result Value Ref Range    WBC Count 5.5 4.0 - 11.0 10e3/uL    RBC Count 4.97 4.40 - 5.90 10e6/uL    Hemoglobin 15.3 13.3 - 17.7 g/dL    Hematocrit 44.3 40.0 - 53.0 %    MCV 89 78 - 100 fL    MCH 30.8 26.5 - 33.0 pg    MCHC 34.5 31.5 - 36.5 g/dL    RDW 12.9 10.0 - 15.0 %    Platelet Count 187 150 - 450 10e3/uL       If you have any questions or concerns, please call the clinic at the number listed above.       Sincerely,      Geovany Perkins MD

## 2022-12-15 DIAGNOSIS — E11.9 CONTROLLED TYPE 2 DIABETES MELLITUS WITHOUT COMPLICATION, WITHOUT LONG-TERM CURRENT USE OF INSULIN (H): ICD-10-CM

## 2022-12-15 RX ORDER — PIOGLITAZONEHYDROCHLORIDE 30 MG/1
30 TABLET ORAL DAILY
Qty: 90 TABLET | Refills: 3 | Status: SHIPPED | OUTPATIENT
Start: 2022-12-15 | End: 2023-12-18

## 2022-12-15 NOTE — RESULT ENCOUNTER NOTE
Dear Demond,    Here is a summary of your recent test results:  -Normal red blood cell (hgb) levels, normal white blood cell count and normal platelet levels.  -PSA (prostate specific antigen) test is normal.  This indicates a low likelihood of prostate cancer.  ADVISE: rechecking this in 1 year.  -Cholesterol levels are at your goal levels.  ADVISE: continuing your medication, a regular exercise program with at least 150 minutes of aerobic exercise per week, and eating a low saturated fat/low carbohydrate diet.  Also, you should recheck this fasting cholesterol panel in 12 months.  -Liver and gallbladder tests (ALT,AST, Alk phos,bilirubin) are normal.  -Kidney function (GFR) is normal.  -Sodium is normal.  -Potassium is normal.  -Calcium is normal.  -Glucose is elevated due to your diabetes.  -A1C test (average blood sugar the last 2-3 months) is above your goal (less than 7.)  ADVISE: Increasing pioglitazone dose to 30 mg daily (he can take to the 50 mg tabs in the morning and I have sent in 30 mg tabs to fill once you run out), making a diabetic followup appointment in 3 months. Please check and record your blood sugars at least 4 times daily for 1 week prior to your appointment and bring for review.  Also, you should recheck your A1C in 3 months.    For additional lab test information, www.[email or web address].com is a very good reference.    In addition, here is a list of due or overdue Health Maintenance reminders:  Eye Exam due on 08/01/2021  Zoster (Shingles) Vaccine(3 of 3) due on 11/25/2021    Please call us at [phone] (or use [employer]) to address the above recommendations if needed.           Thank you very much for trusting me and [facility].     Have a peaceful day.    Healthy regards,  Dinh Perkins MD

## 2023-05-26 ENCOUNTER — TRANSFERRED RECORDS (OUTPATIENT)
Dept: HEALTH INFORMATION MANAGEMENT | Facility: CLINIC | Age: 73
End: 2023-05-26
Payer: MEDICARE

## 2023-05-26 LAB — RETINOPATHY: NEGATIVE

## 2023-07-02 ENCOUNTER — HEALTH MAINTENANCE LETTER (OUTPATIENT)
Age: 73
End: 2023-07-02

## 2023-07-26 ENCOUNTER — NURSE TRIAGE (OUTPATIENT)
Dept: FAMILY MEDICINE | Facility: CLINIC | Age: 73
End: 2023-07-26
Payer: MEDICARE

## 2023-07-26 NOTE — TELEPHONE ENCOUNTER
Pcp and justin lunsford are out, routing to provider group for review.     Trinidad CHOWDARY RN   [facility] Triage

## 2023-07-26 NOTE — TELEPHONE ENCOUNTER
Attempt #1  Called Phone # [phone]      Left a non detailed voicemail to please call back and ask for any available triage nurse , a provider has further recommendation from your phone call @ [phone].     Message labeled as urgent.     Trinidad CHOWDARY RN   [facility] Triage

## 2023-07-26 NOTE — TELEPHONE ENCOUNTER
Nurse Triage SBAR    Is this a 2nd Level Triage? YES, LICENSED PRACTITIONER REVIEW IS REQUIRED    Situation: Patient calling in regarding SOB that he is getting with more strenuous physical exercise. Patient denies having SOB when walking and explains it is only when he is doing a little more than just walking. This has been present for 1 month. Denies any severe difficulty breathing.     Background: Patient denies any history of blood clots, heart issues, or breathing issues prior to this.     Assessment: Patient was already scheduled an appointment tomorrow with Angie Shane NP. RN advised patient per protocol he should be seen today, but felt that he would be ok to wait until tomorrow. RN reviewed red flag symptoms with patient and when to see emergency care. Patient agreed and understood. RN advised him I would get a message to provider to confirm if they feel he can wait till tomorrow as well.     Protocol Recommended Disposition:   Go To Office Now    Recommendation: Per protocol, patient should be seen today but is already scheduled with you Angie Shane NP tomorrow. Is patient ok to wait until this appointment or should he present to UC/ED today?    NATHAN Rahman, RN        Routed to provider    Does the patient meet one of the following criteria for ADS visit consideration? 16+ years old, with an [facility] PCP     TIP  Providers, please consider if this condition is appropriate for management at one of our Acute and Diagnostic Services sites.     If patient is a good candidate, please use dotphrase <dot>triageresponse and select Refer to ADS to document.    Reason for Disposition   MILD difficulty breathing (e.g., minimal/no SOB at rest, SOB with walking, pulse < 100) of new-onset or worse than normal    Additional Information   Negative: SEVERE difficulty breathing (e.g., struggling for each breath, speaks in single words, pulse > 120)   Negative: Breathing stopped and hasn't returned   Negative: Choking  "on something   Negative: Bluish (or gray) lips or face   Negative: Difficult to awaken or acting confused (e.g., disoriented, slurred speech)   Negative: Passed out (i.e., fainted, collapsed and was not responding)   Negative: Wheezing started suddenly after medicine, an allergic food, or bee sting   Negative: Stridor   Negative: Slow, shallow and weak breathing   Negative: Sounds like a life-threatening emergency to the triager   Negative: MODERATE difficulty breathing (e.g., speaks in phrases, SOB even at rest, pulse 100-120) of new-onset or worse than normal   Negative: Oxygen level (e.g., pulse oximetry) 90 percent or lower   Negative: Wheezing can be heard across the room   Negative: Drooling or spitting out saliva (because can't swallow)   Negative: Any history of prior \"blood clot\" in leg or lungs   Negative: Illness requiring prolonged bedrest in past month (e.g., immobilization, long hospital stay)   Negative: Hip or leg fracture (broken bone) in past month (or had cast on leg or ankle in past month)   Negative: Major surgery in the past month   Negative: Long-distance travel in past month (e.g., car, bus, train, plane; with trip lasting 6 or more hours)   Negative: Cancer treatment in past six months (or has cancer now)   Negative: Extra heart beats OR irregular heart beating (i.e., \"palpitations\")   Negative: Fever > 103 F (39.4 C)   Negative: Fever > 101 F (38.3 C) and over 60 years of age   Negative: Fever > 100.0 F (37.8 C) and bedridden (e.g., nursing home patient, stroke, chronic illness, recovering from surgery)   Negative: Fever > 100.0 F (37.8 C) and diabetes mellitus or weak immune system (e.g., HIV positive, cancer chemo, splenectomy, organ transplant, chronic steroids)   Negative: Periods where breathing stops and then resumes normally and bedridden (e.g., nursing home patient, CVA)   Negative: Pregnant or postpartum (from 0 to 6 weeks after delivery)   Negative: Patient sounds very sick or " weak to the triager    Protocols used: Breathing Difficulty-A-[place]

## 2023-07-26 NOTE — TELEPHONE ENCOUNTER
RV triage please call.     Protocol states being seen today is appropriate.      He does not normally have any difficulties breathing that I see.   Any leg swelling?  Chest discomfort?  Recent travel?  Please see if he is an ADS candidate and if they will see him today for further work-up for pulmonary or cardiac causes such as heart failure PE etc.  If patient is unwilling to be seen today keeping appointment tomorrow is appropriate.    Any red flag symptoms he should be in the emergency room.      Healthy regards,            Angie Romo, FNP-BC

## 2023-07-27 ENCOUNTER — ANCILLARY PROCEDURE (OUTPATIENT)
Dept: GENERAL RADIOLOGY | Facility: CLINIC | Age: 73
End: 2023-07-27
Attending: NURSE PRACTITIONER
Payer: MEDICARE

## 2023-07-27 ENCOUNTER — OFFICE VISIT (OUTPATIENT)
Dept: FAMILY MEDICINE | Facility: CLINIC | Age: 73
End: 2023-07-27
Payer: MEDICARE

## 2023-07-27 VITALS
SYSTOLIC BLOOD PRESSURE: 120 MMHG | OXYGEN SATURATION: 98 % | HEART RATE: 53 BPM | WEIGHT: 240 LBS | TEMPERATURE: 98 F | HEIGHT: 68 IN | BODY MASS INDEX: 36.37 KG/M2 | DIASTOLIC BLOOD PRESSURE: 62 MMHG | RESPIRATION RATE: 18 BRPM

## 2023-07-27 DIAGNOSIS — E66.01 MORBID OBESITY (H): ICD-10-CM

## 2023-07-27 DIAGNOSIS — R00.1 BRADYCARDIA: ICD-10-CM

## 2023-07-27 DIAGNOSIS — E11.65 UNCONTROLLED TYPE 2 DIABETES MELLITUS WITH HYPERGLYCEMIA (H): ICD-10-CM

## 2023-07-27 DIAGNOSIS — I10 HYPERTENSION GOAL BP (BLOOD PRESSURE) < 140/90: ICD-10-CM

## 2023-07-27 DIAGNOSIS — R06.09 DYSPNEA ON EXERTION: Primary | ICD-10-CM

## 2023-07-27 DIAGNOSIS — R06.09 DYSPNEA ON EXERTION: ICD-10-CM

## 2023-07-27 DIAGNOSIS — Z23 NEED FOR SHINGLES VACCINE: ICD-10-CM

## 2023-07-27 LAB
ALBUMIN SERPL BCG-MCNC: 4.4 G/DL (ref 3.5–5.2)
ALP SERPL-CCNC: 55 U/L (ref 40–129)
ALT SERPL W P-5'-P-CCNC: 26 U/L (ref 0–70)
ANION GAP SERPL CALCULATED.3IONS-SCNC: 13 MMOL/L (ref 7–15)
AST SERPL W P-5'-P-CCNC: 27 U/L (ref 0–45)
BILIRUB SERPL-MCNC: 0.5 MG/DL
BUN SERPL-MCNC: 20.8 MG/DL (ref 8–23)
CALCIUM SERPL-MCNC: 9.6 MG/DL (ref 8.8–10.2)
CHLORIDE SERPL-SCNC: 107 MMOL/L (ref 98–107)
CREAT SERPL-MCNC: 0.93 MG/DL (ref 0.67–1.17)
DEPRECATED HCO3 PLAS-SCNC: 20 MMOL/L (ref 22–29)
ERYTHROCYTE [DISTWIDTH] IN BLOOD BY AUTOMATED COUNT: 14 % (ref 10–15)
GFR SERPL CREATININE-BSD FRML MDRD: 88 ML/MIN/1.73M2
GLUCOSE SERPL-MCNC: 135 MG/DL (ref 70–99)
HBA1C MFR BLD: 7.5 % (ref 0–5.6)
HCT VFR BLD AUTO: 43.2 % (ref 40–53)
HGB BLD-MCNC: 15 G/DL (ref 13.3–17.7)
MCH RBC QN AUTO: 31.7 PG (ref 26.5–33)
MCHC RBC AUTO-ENTMCNC: 34.7 G/DL (ref 31.5–36.5)
MCV RBC AUTO: 91 FL (ref 78–100)
PLATELET # BLD AUTO: 190 10E3/UL (ref 150–450)
POTASSIUM SERPL-SCNC: 4.8 MMOL/L (ref 3.4–5.3)
PROT SERPL-MCNC: 7.1 G/DL (ref 6.4–8.3)
RBC # BLD AUTO: 4.73 10E6/UL (ref 4.4–5.9)
SODIUM SERPL-SCNC: 140 MMOL/L (ref 136–145)
TSH SERPL DL<=0.005 MIU/L-ACNC: 2.55 UIU/ML (ref 0.3–4.2)
WBC # BLD AUTO: 6.7 10E3/UL (ref 4–11)

## 2023-07-27 PROCEDURE — 71046 X-RAY EXAM CHEST 2 VIEWS: CPT | Mod: TC | Performed by: RADIOLOGY

## 2023-07-27 PROCEDURE — 99214 OFFICE O/P EST MOD 30 MIN: CPT | Performed by: NURSE PRACTITIONER

## 2023-07-27 PROCEDURE — 36415 COLL VENOUS BLD VENIPUNCTURE: CPT | Performed by: NURSE PRACTITIONER

## 2023-07-27 PROCEDURE — 85027 COMPLETE CBC AUTOMATED: CPT | Performed by: NURSE PRACTITIONER

## 2023-07-27 PROCEDURE — 93000 ELECTROCARDIOGRAM COMPLETE: CPT | Performed by: NURSE PRACTITIONER

## 2023-07-27 PROCEDURE — 83036 HEMOGLOBIN GLYCOSYLATED A1C: CPT | Performed by: NURSE PRACTITIONER

## 2023-07-27 PROCEDURE — 99207 E-CONSULT TO CARDIOLOGY (ADULT OUTPT PROVIDER TO SPECIALIST WRITTEN QUESTION & RESPONSE): CPT | Performed by: NURSE PRACTITIONER

## 2023-07-27 PROCEDURE — 84443 ASSAY THYROID STIM HORMONE: CPT | Performed by: NURSE PRACTITIONER

## 2023-07-27 PROCEDURE — 80053 COMPREHEN METABOLIC PANEL: CPT | Performed by: NURSE PRACTITIONER

## 2023-07-27 NOTE — PROGRESS NOTES
"  Assessment & Plan     Dyspnea on exertion  E consult, labs today. EKG and CXR appear stable.  - EKG 12-lead complete w/read - Clinics  - XR Chest 2 Views  - TSH with free T4 reflex  - CBC with platelets  - Comprehensive metabolic panel (BMP + Alb, Alk Phos, ALT, AST, Total. Bili, TP)  - TSH with free T4 reflex  - CBC with platelets  - Comprehensive metabolic panel (BMP + Alb, Alk Phos, ALT, AST, Total. Bili, TP)  - Adult E-Consult to Cardiology (Outpt Provider to Specialist Written Question & Response)    Bradycardia  - EKG 12-lead complete w/read - Clinics  - Adult E-Consult to Cardiology (Outpt Provider to Specialist Written Question & Response)    Hypertension goal BP (blood pressure) < 140/90    Uncontrolled type 2 diabetes mellitus with hyperglycemia (H)  Recheck levels.  - Hemoglobin A1c  - Hemoglobin A1c    Morbid obesity (H)  Continue to work on weight loss which will contribute to improved management of DM II        Review of the result(s) of each unique test - lab  Ordering of each unique test       BMI:   Estimated body mass index is 36.49 kg/m  as calculated from the following:    Height as of this encounter: 1.727 m (5' 8\").    Weight as of this encounter: 108.9 kg (240 lb).   Weight management plan: Discussed healthy diet and exercise guidelines    CONSULTATION/REFERRAL to e consult cardiology, possible further cardiac testing.     Angie Shane, [facility] RAZ Lagos is a 73 year old, presenting for the following health issues:  shortness of breath      7/27/2023    10:07 AM   Additional Questions   Roomed by angie navarro   Accompanied by self         7/27/2023    10:07 AM   Patient Reported Additional Medications   Patient reports taking the following new medications none       History of Present Illness       Reason for visit:  Shortness of breath  Symptom onset:  More than a month  Symptom intensity:  Mild  Symptom progression:  Staying the same  Had these symptoms " "before:  No  What makes it better:  Sitting down    He eats 0-1 servings of fruits and vegetables daily.He consumes 2 sweetened beverage(s) daily.He exercises with enough effort to increase his heart rate 9 or less minutes per day.  He exercises with enough effort to increase his heart rate 3 or less days per week.   He is taking medications regularly.         SOB with exertion. Stops quickly when stops to rest. Not struggling to breath but noticing more frequently. Dizzy and lightheaded with SOB episodes.     Not limiting activity other than that. No current symptoms while in clinic. No chest pain, denied palpitations or irregular heart rate.    Former smoker but no history of asthma or COPD. No wheezing.    Review of Systems   Constitutional, HEENT, cardiovascular, pulmonary, GI, , musculoskeletal, neuro, skin, endocrine and psych systems are negative, except as otherwise noted.      Objective    /62   Pulse 53   Temp 98  F (36.7  C)   Resp 18   Ht 1.727 m (5' 8\")   Wt 108.9 kg (240 lb)   SpO2 98%   BMI 36.49 kg/m    Body mass index is 36.49 kg/m .  Physical Exam   GENERAL: healthy, alert and no distress  EYES: Eyes grossly normal to inspection, PERRL and conjunctivae and sclerae normal  HENT: ear canals and TM's normal, nose and mouth without ulcers or lesions  NECK: no adenopathy, no asymmetry, masses, or scars and thyroid normal to palpation  RESP: lungs clear to auscultation - no rales, rhonchi or wheezes  CV: bradycardia, normal S1 S2, no S3 or S4, and no murmur, click or rub  ABDOMEN: soft, nontender, no hepatosplenomegaly, no masses and bowel sounds normal  MS: no gross musculoskeletal defects noted, no edema  SKIN: no suspicious lesions or rashes  NEURO: Normal strength and tone, mentation intact and speech normal  PSYCH: mentation appears normal, affect normal/bright    CXR - Reviewed and interpreted by me Normal- no infiltrates, effusions, pneumothoraces, or masses  Previous calcification " in LLL present  Xray - Reviewed and interpreted by me.  Sinus bradycardia, 1st degree AV block. T wave changes which have been present prior EKG as well.

## 2023-07-29 ENCOUNTER — E-CONSULT (OUTPATIENT)
Dept: CARDIOLOGY | Facility: CLINIC | Age: 73
End: 2023-07-29
Payer: MEDICARE

## 2023-07-29 PROCEDURE — 99207 PR NO DOCUMENTATION ON VISIT: CPT | Performed by: INTERNAL MEDICINE

## 2023-07-29 NOTE — PROGRESS NOTES
7/29/2023     E-Consult has been denied due to: Complexity of question, needs in-person referral.    Interprofessional consultation requested by:  Angie Shane CNP      Clinical Question/Purpose: MY CLINICAL QUESTION IS: patient presenting with SOB on exertion. SOB will resolve once stops exercise for about a minute. Not limiting activity other than that. EKG appears stable with previous with exception of bradycardia, possibly 1st degree AV block, similar T wave changes appear on other historical EKG. No current symptoms while in clinic.Was considering stress test given risk factors but with bradycardia wondering if other testing vs cardiology consult would be more appropriate.     Patient assessment and information reviewed:    Recommendations:      The recommendations provided in this E-Consult are based on a review of clinical data pertinent to the clinical question presented, without a review of the patient's complete medical record or, the benefit of a comprehensive in-person or virtual patient evaluation. This consultation should not replace the clinical judgement and evaluation of the provider ordering this E-Consult. Any new clinical issues, or changes in patient status since the filing of this E-Consult will need to be taken into account when assessing these recommendations. Please contact me if you have further questions.    My total time spent reviewing clinical information and formulating assessment was 5 minutes.        Brett Wasserman MD

## 2023-10-23 ENCOUNTER — PATIENT OUTREACH (OUTPATIENT)
Dept: CARE COORDINATION | Facility: CLINIC | Age: 73
End: 2023-10-23
Payer: MEDICARE

## 2023-11-06 ENCOUNTER — PATIENT OUTREACH (OUTPATIENT)
Dept: CARE COORDINATION | Facility: CLINIC | Age: 73
End: 2023-11-06
Payer: MEDICARE

## 2023-12-16 DIAGNOSIS — E11.9 CONTROLLED TYPE 2 DIABETES MELLITUS WITHOUT COMPLICATION, WITHOUT LONG-TERM CURRENT USE OF INSULIN (H): ICD-10-CM

## 2023-12-17 DIAGNOSIS — E11.9 CONTROLLED TYPE 2 DIABETES MELLITUS WITHOUT COMPLICATION, WITHOUT LONG-TERM CURRENT USE OF INSULIN (H): ICD-10-CM

## 2023-12-18 RX ORDER — PIOGLITAZONEHYDROCHLORIDE 30 MG/1
30 TABLET ORAL DAILY
Qty: 90 TABLET | Refills: 0 | Status: SHIPPED | OUTPATIENT
Start: 2023-12-18 | End: 2024-02-13

## 2024-01-11 ENCOUNTER — TELEPHONE (OUTPATIENT)
Dept: FAMILY MEDICINE | Facility: CLINIC | Age: 74
End: 2024-01-11
Payer: MEDICARE

## 2024-01-11 DIAGNOSIS — E78.5 HYPERLIPIDEMIA LDL GOAL <70: ICD-10-CM

## 2024-01-11 DIAGNOSIS — I10 HYPERTENSION GOAL BP (BLOOD PRESSURE) < 130/80: ICD-10-CM

## 2024-01-11 DIAGNOSIS — E11.9 CONTROLLED TYPE 2 DIABETES MELLITUS WITHOUT COMPLICATION, WITHOUT LONG-TERM CURRENT USE OF INSULIN (H): ICD-10-CM

## 2024-01-11 RX ORDER — PIOGLITAZONEHYDROCHLORIDE 30 MG/1
30 TABLET ORAL DAILY
Qty: 90 TABLET | Refills: 0 | OUTPATIENT
Start: 2024-01-11

## 2024-01-11 RX ORDER — SIMVASTATIN 40 MG
40 TABLET ORAL AT BEDTIME
Qty: 90 TABLET | Refills: 0 | Status: SHIPPED | OUTPATIENT
Start: 2024-01-11 | End: 2024-02-13

## 2024-01-11 RX ORDER — GLIMEPIRIDE 4 MG/1
8 TABLET ORAL
Qty: 180 TABLET | Refills: 1 | Status: SHIPPED | OUTPATIENT
Start: 2024-01-11 | End: 2024-02-13

## 2024-01-11 RX ORDER — LISINOPRIL 10 MG/1
10 TABLET ORAL DAILY
Qty: 90 TABLET | Refills: 1 | Status: SHIPPED | OUTPATIENT
Start: 2024-01-11 | End: 2024-02-13

## 2024-01-11 NOTE — TELEPHONE ENCOUNTER
Reason for Call:  Other returning call    Detailed comments: Not sure if he needs appointment with the provider to get his prescriptions refilled or is it just sent into the pharmacy by provider. Please call back to inform.    Phone Number Patient can be reached at: Cell number on file:    Telephone Information:   Mobile [phone]       Best Time: any    Can we leave a detailed message on this number? YES    Call taken on 1/11/2024 at 9:58 AM by Candy Tinoco

## 2024-01-11 NOTE — TELEPHONE ENCOUNTER
Patient due for appointment   [FreeTextEntry8] : This visit was provided via TELEPHONE. The patient, CHERELLE MEJIA , was located at home,[address] [place] [address] [address]\[place] [address] , at the time of the visit. \par The provider,STEPHON ARMIJO , was located at his medical office located in  at the time of the visit. The patient, and Provider participated in the TELEPHONE\par Verbal consent given on Sep 22 2021  5:30PM by the patient\par \par \par 48 YO F with Morbid Obesity and DM2 A1C 6.6 admitted for ARDS second to \par SARS-COV-2, intubated 1/5-1/10 then transferred to Medicine, however failed \par BIPAP and reintubated 1/16. Course complicated ECOLI UTI, Mouth Sores and \par Enterobacter and MRSA VAP. s/p Tracheostomy 1/28 and PEG 1/26 today to follow up\par \par \par DMII: 6.6 ha1c;; HOME fasting FS 100s;stated that never had been diagnosed with DM before this hospitalization; \par \par use O2 when ambulating with 2L no need o2 while resting now \par no fever or chills\par \par saw pulm Dr.Lisker, Gita N ( [phone]) [place] 04/14/2021;  advised to continue with but tapering down O2; saw pulm in 06/2021; had PFT and ct lung with result grossly nwl per pt; ; WILL SEE pulm AGAIN IN 12/2021;  \par \par able to walk 10 mints without O2 1L; \par able to rest and ambulate inside the house; \par \par still report hair loss \par \par also reported 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; saw derm and has injection now; saw plastic and offer treatment option which she is still consider; \par \par pain of arm and shoulder had b/l shoulder x ray wnl  during hospitalization; WAS REFER to Physical Therapy; AND ALSO SAW NEURO ON PHYSICAL THERAPY \par \par need  help for ADLs( shower /dressing) due to shoulder pain and numbness  and IADLs for now;\par \par \par reported b/l leg swelling had negative US LOWER EXTREMITIES BY PULM;\par \par \par mood stable ; she dose have night mare; and flash back and some anxiety;\par \par \par lowe back pain; non radiating; no inury\par \par wiegh gain and prior hx of prathyroidectomy \par \par \par interval hx 09/22/2021\par \par has not do us thyroid /mammo /see endo\par

## 2024-01-11 NOTE — TELEPHONE ENCOUNTER
Left message to call back with patient and patient's wife (c2c on file). Patient is overdue for wellness please schedule appointment.

## 2024-01-11 NOTE — PROGRESS NOTES
{PROVIDER CHARTING PREFERENCE:498983}    Subjective   Demond is a 71 year old who presents for the following health issues Medication refill for Glimeperide  Patient states he would like to get shingles and flu shot today    HPI     Diabetes Follow-up    How often are you checking your blood sugar? One time daily  What time of day are you checking your blood sugars (select all that apply)?  breaktime at work at 9:30 am  Have you had any blood sugars above 200?  Yes 2 or 3 times a week  Have you had any blood sugars below 70?  No    What symptoms do you notice when your blood sugar is low?  None and Not applicable    What concerns do you have today about your diabetes? Upset he cannot get the medication he prefers - semaglutide      Do you have any of these symptoms? (Select all that apply)  Numbness in feet, Burning in feet and Redness, sores, or blisters on feet    Have you had a diabetic eye exam in the last 12 months? No plans on making an appt soon        BP Readings from Last 2 Encounters:   02/26/21 120/80   08/04/20 130/80     Hemoglobin A1C (%)   Date Value   05/26/2021 7.3 (H)   02/26/2021 9.2 (H)     LDL Cholesterol Calculated (mg/dL)   Date Value   08/04/2020 91   09/12/2019 82     {additonal problems for provider to add (Optional):640072}    Review of Systems   {ROS COMP (Optional):885176}      Objective    There were no vitals taken for this visit.  There is no height or weight on file to calculate BMI.  Physical Exam   {Exam List (Optional):577326}    {Diagnostic Test Results (Optional):018833}    {AMBULATORY ATTESTATION (Optional):167071}         Walk in

## 2024-01-19 ENCOUNTER — TELEPHONE (OUTPATIENT)
Dept: FAMILY MEDICINE | Facility: CLINIC | Age: 74
End: 2024-01-19
Payer: MEDICARE

## 2024-01-19 DIAGNOSIS — Z12.5 SCREENING FOR PROSTATE CANCER: ICD-10-CM

## 2024-01-19 DIAGNOSIS — E11.9 CONTROLLED TYPE 2 DIABETES MELLITUS WITHOUT COMPLICATION, WITHOUT LONG-TERM CURRENT USE OF INSULIN (H): Primary | ICD-10-CM

## 2024-01-19 DIAGNOSIS — I10 HYPERTENSION GOAL BP (BLOOD PRESSURE) < 130/80: ICD-10-CM

## 2024-01-19 DIAGNOSIS — E78.5 HYPERLIPIDEMIA LDL GOAL <70: ICD-10-CM

## 2024-01-22 ENCOUNTER — LAB (OUTPATIENT)
Dept: LAB | Facility: CLINIC | Age: 74
End: 2024-01-22
Payer: MEDICARE

## 2024-01-22 DIAGNOSIS — E11.9 CONTROLLED TYPE 2 DIABETES MELLITUS WITHOUT COMPLICATION, WITHOUT LONG-TERM CURRENT USE OF INSULIN (H): ICD-10-CM

## 2024-01-22 DIAGNOSIS — E78.5 HYPERLIPIDEMIA LDL GOAL <70: ICD-10-CM

## 2024-01-22 DIAGNOSIS — I10 HYPERTENSION GOAL BP (BLOOD PRESSURE) < 130/80: ICD-10-CM

## 2024-01-22 DIAGNOSIS — Z12.5 SCREENING FOR PROSTATE CANCER: ICD-10-CM

## 2024-01-22 LAB
ALBUMIN SERPL BCG-MCNC: 4.3 G/DL (ref 3.5–5.2)
ALP SERPL-CCNC: 63 U/L (ref 40–150)
ALT SERPL W P-5'-P-CCNC: 34 U/L (ref 0–70)
ANION GAP SERPL CALCULATED.3IONS-SCNC: 9 MMOL/L (ref 7–15)
AST SERPL W P-5'-P-CCNC: 28 U/L (ref 0–45)
BILIRUB SERPL-MCNC: 0.4 MG/DL
BUN SERPL-MCNC: 14.9 MG/DL (ref 8–23)
CALCIUM SERPL-MCNC: 9.8 MG/DL (ref 8.8–10.2)
CHLORIDE SERPL-SCNC: 101 MMOL/L (ref 98–107)
CHOLEST SERPL-MCNC: 156 MG/DL
CREAT SERPL-MCNC: 1.02 MG/DL (ref 0.67–1.17)
CREAT UR-MCNC: 111 MG/DL
DEPRECATED HCO3 PLAS-SCNC: 28 MMOL/L (ref 22–29)
EGFRCR SERPLBLD CKD-EPI 2021: 78 ML/MIN/1.73M2
FASTING STATUS PATIENT QL REPORTED: YES
GLUCOSE SERPL-MCNC: 135 MG/DL (ref 70–99)
HBA1C MFR BLD: 7.5 % (ref 0–5.6)
HDLC SERPL-MCNC: 37 MG/DL
LDLC SERPL CALC-MCNC: 90 MG/DL
MICROALBUMIN UR-MCNC: 14.6 MG/L
MICROALBUMIN/CREAT UR: 13.15 MG/G CR (ref 0–17)
NONHDLC SERPL-MCNC: 119 MG/DL
POTASSIUM SERPL-SCNC: 4.3 MMOL/L (ref 3.4–5.3)
PROT SERPL-MCNC: 7 G/DL (ref 6.4–8.3)
PSA SERPL DL<=0.01 NG/ML-MCNC: 1.49 NG/ML (ref 0–6.5)
SODIUM SERPL-SCNC: 138 MMOL/L (ref 135–145)
TRIGL SERPL-MCNC: 143 MG/DL

## 2024-01-22 PROCEDURE — 82043 UR ALBUMIN QUANTITATIVE: CPT

## 2024-01-22 PROCEDURE — G0103 PSA SCREENING: HCPCS

## 2024-01-22 PROCEDURE — 80053 COMPREHEN METABOLIC PANEL: CPT

## 2024-01-22 PROCEDURE — 80061 LIPID PANEL: CPT

## 2024-01-22 PROCEDURE — 36415 COLL VENOUS BLD VENIPUNCTURE: CPT

## 2024-01-22 PROCEDURE — 82570 ASSAY OF URINE CREATININE: CPT

## 2024-01-22 PROCEDURE — 83036 HEMOGLOBIN GLYCOSYLATED A1C: CPT

## 2024-01-23 NOTE — RESULT ENCOUNTER NOTE
Dear Demond,    Here is a summary of your recent test results:  -PSA (prostate specific antigen) test is normal.  This indicates a low likelihood of prostate cancer.  ADVISE: rechecking this in 1 year.  -Cholesterol levels are at your goal levels.  ADVISE: continuing your medication, a regular exercise program with at least 150 minutes of aerobic exercise per week, and eating a low saturated fat/low carbohydrate diet.  Also, you should recheck this fasting cholesterol panel in 12 months.  -Liver and gallbladder tests (ALT,AST, Alk phos,bilirubin) are normal.  -Kidney function (GFR) is normal.  -Sodium is normal.  -Potassium is normal.  -Calcium is normal.  -Glucose is elevated due to your diabetes.  -A1C (test of diabetes control the last 2-3 months) is slightly above your goal. Please continue with your current plan. Also, you should make an appointment to see me and recheck your A1C test in 6 months.   -Microalbumin (urine protein) test is normal.  ADVISE: rechecking this annually.    For additional lab test information, [email or web address] is a very good reference.    In addition, here is a list of due or overdue Health Maintenance reminders:  RSV VACCINE (Pregnancy & 60+)(1 - 1-dose 60+ series) Never done  Zoster (Shingles) Vaccine(3 of 3) due on 11/25/2021  Annual Wellness Visit due on 11/22/2023  Diabetic Foot Exam due on 11/22/2023  FALL RISK ASSESSMENT due on 11/22/2023  PHQ-2 (once per calendar year) due on 01/01/2024    Please call us at [phone] (or use [employer]) to address the above recommendations if needed.           Thank you very much for trusting me and [facility].     Have a peaceful day.    Healthy regards,  Dinh Perkins MD

## 2024-01-28 ENCOUNTER — HEALTH MAINTENANCE LETTER (OUTPATIENT)
Age: 74
End: 2024-01-28

## 2024-02-13 ENCOUNTER — OFFICE VISIT (OUTPATIENT)
Dept: FAMILY MEDICINE | Facility: CLINIC | Age: 74
End: 2024-02-13
Payer: MEDICARE

## 2024-02-13 VITALS
RESPIRATION RATE: 16 BRPM | SYSTOLIC BLOOD PRESSURE: 126 MMHG | WEIGHT: 249 LBS | DIASTOLIC BLOOD PRESSURE: 74 MMHG | TEMPERATURE: 98.1 F | HEART RATE: 79 BPM | OXYGEN SATURATION: 95 % | BODY MASS INDEX: 37.74 KG/M2 | HEIGHT: 68 IN

## 2024-02-13 DIAGNOSIS — E78.5 HYPERLIPIDEMIA LDL GOAL <70: ICD-10-CM

## 2024-02-13 DIAGNOSIS — R39.198 SLOWING OF URINARY STREAM: ICD-10-CM

## 2024-02-13 DIAGNOSIS — E66.01 SEVERE OBESITY (BMI 35.0-39.9) WITH COMORBIDITY (H): ICD-10-CM

## 2024-02-13 DIAGNOSIS — E11.42 DIABETIC POLYNEUROPATHY ASSOCIATED WITH TYPE 2 DIABETES MELLITUS (H): ICD-10-CM

## 2024-02-13 DIAGNOSIS — N52.9 ERECTILE DYSFUNCTION, UNSPECIFIED ERECTILE DYSFUNCTION TYPE: ICD-10-CM

## 2024-02-13 DIAGNOSIS — Z00.00 ENCOUNTER FOR MEDICARE ANNUAL WELLNESS EXAM: Primary | ICD-10-CM

## 2024-02-13 DIAGNOSIS — I10 HYPERTENSION GOAL BP (BLOOD PRESSURE) < 130/80: ICD-10-CM

## 2024-02-13 DIAGNOSIS — E11.9 CONTROLLED TYPE 2 DIABETES MELLITUS WITHOUT COMPLICATION, WITHOUT LONG-TERM CURRENT USE OF INSULIN (H): ICD-10-CM

## 2024-02-13 PROCEDURE — 99214 OFFICE O/P EST MOD 30 MIN: CPT | Mod: 25 | Performed by: FAMILY MEDICINE

## 2024-02-13 PROCEDURE — G0439 PPPS, SUBSEQ VISIT: HCPCS | Performed by: FAMILY MEDICINE

## 2024-02-13 RX ORDER — SIMVASTATIN 40 MG
40 TABLET ORAL AT BEDTIME
Qty: 90 TABLET | Refills: 4 | Status: SHIPPED | OUTPATIENT
Start: 2024-02-13

## 2024-02-13 RX ORDER — SILDENAFIL 50 MG/1
50 TABLET, FILM COATED ORAL DAILY PRN
Qty: 30 TABLET | Refills: 11 | Status: SHIPPED | OUTPATIENT
Start: 2024-02-13

## 2024-02-13 RX ORDER — LISINOPRIL 10 MG/1
10 TABLET ORAL DAILY
Qty: 90 TABLET | Refills: 4 | Status: SHIPPED | OUTPATIENT
Start: 2024-02-13

## 2024-02-13 RX ORDER — GLIMEPIRIDE 4 MG/1
8 TABLET ORAL
Qty: 180 TABLET | Refills: 4 | Status: SHIPPED | OUTPATIENT
Start: 2024-02-13

## 2024-02-13 RX ORDER — TAMSULOSIN HYDROCHLORIDE 0.4 MG/1
0.4 CAPSULE ORAL DAILY
Qty: 90 CAPSULE | Refills: 3 | Status: SHIPPED | OUTPATIENT
Start: 2024-02-13

## 2024-02-13 RX ORDER — PIOGLITAZONEHYDROCHLORIDE 30 MG/1
30 TABLET ORAL DAILY
Qty: 90 TABLET | Refills: 4 | Status: SHIPPED | OUTPATIENT
Start: 2024-02-13

## 2024-02-13 SDOH — HEALTH STABILITY: PHYSICAL HEALTH: ON AVERAGE, HOW MANY MINUTES DO YOU ENGAGE IN EXERCISE AT THIS LEVEL?: 10 MIN

## 2024-02-13 SDOH — HEALTH STABILITY: PHYSICAL HEALTH: ON AVERAGE, HOW MANY DAYS PER WEEK DO YOU ENGAGE IN MODERATE TO STRENUOUS EXERCISE (LIKE A BRISK WALK)?: 5 DAYS

## 2024-02-13 ASSESSMENT — SOCIAL DETERMINANTS OF HEALTH (SDOH): HOW OFTEN DO YOU GET TOGETHER WITH FRIENDS OR RELATIVES?: NEVER

## 2024-02-13 NOTE — PATIENT INSTRUCTIONS
Your Health Risk Assessment indicates you feel you are not in good health    A healthy lifestyle helps keep the body fit and the mind alert. It helps protect you from disease, helps you fight disease, and helps prevent chronic disease (disease that doesn't go away) from getting worse. This is important as you get older and begin to notice twinges in muscles and joints and a decline in the strength and stamina you once took for granted. A healthy lifestyle includes good healthcare, good nutrition, weight control, recreation, and regular exercise. Avoid harmful substances and do what you can to keep safe. Another part of a healthy lifestyle is stay mentally active and socially involved.    Good healthcare   Have a wellness visit every year.   If you have new symptoms, let us know right away. Don't wait until the next checkup.   Take medicines exactly as prescribed and keep your medicines in a safe place. Tell us if your medicine causes problems.   Healthy diet and weight control   Eat 3 or 4 small, nutritious, low-fat, high-fiber meals a day. Include a variety of fruits, vegetables, and whole-grain foods.   Make sure you get enough calcium in your diet. Calcium, vitamin D, and exercise help prevent osteoporosis (bone thinning).   If you live alone, try eating with others when you can. That way you get a good meal and have company while you eat it.   Try to keep a healthy weight. If you eat more calories than your body uses for energy, it will be stored as fat and you will gain weight.     Recreation   Recreation is not limited to sports and team events. It includes any activity that provides relaxation, interest, enjoyment, and exercise. Recreation provides an outlet for physical, mental, and social energy. It can give a sense of worth and achievement. It can help you stay healthy.    Mental Exercise and Social Involvement  Mental and emotional health is as important as physical health. Keep in touch with friends and  "family. Stay as active as possible. Continue to learn and challenge yourself.   Things you can do to stay mentally active are:  Learn something new, like a foreign language or musical instrument.   Play SCRABBLE or do crossword puzzles. If you cannot find people to play these games with you at home, you can play them with others on your computer through the Internet.   Join a games club--anything from card games to chess or checkers or lawn bowling.   Start a new hobby.   Go back to school.   Volunteer.   Read.   Keep up with world events.  Learning About Being Physically Active  What is physical activity?     Being physically active means doing any kind of activity that gets your body moving.  The types of physical activity that can help you get fit and stay healthy include:  Aerobic or \"cardio\" activities. These make your heart beat faster and make you breathe harder, such as brisk walking, riding a bike, or running. They strengthen your heart and lungs and build up your endurance.  Strength training activities. These make your muscles work against, or \"resist,\" something. Examples include lifting weights or doing push-ups. These activities help tone and strengthen your muscles and bones.  Stretches. These let you move your joints and muscles through their full range of motion. Stretching helps you be more flexible.  Reaching a balance between these three types of physical activity is important because each one contributes to your overall fitness.  What are the benefits of being active?  Being active is one of the best things you can do for your health. It helps you to:  Feel stronger and have more energy to do all the things you like to do.  Focus better at school or work.  Feel, think, and sleep better.  Reach and stay at a healthy weight.  Lose fat and build lean muscle.  Lower your risk for serious health problems, including diabetes, heart attack, high blood pressure, and some cancers.  Keep your heart, " "lungs, bones, muscles, and joints strong and healthy.  How can you make being active part of your life?  Start slowly. Make it your long-term goal to get at least 30 minutes of exercise on most days of the week. Walking is a good choice. You also may want to do other activities, such as running, swimming, cycling, or playing tennis or team sports.  Pick activities that you like--ones that make your heart beat faster, your muscles stronger, and your muscles and joints more flexible. If you find more than one thing you like doing, do them all. You don't have to do the same thing every day.  Get your heart pumping every day. Any activity that makes your heart beat faster and keeps it at that rate for a while counts.  Here are some great ways to get your heart beating faster:  Go for a brisk walk, run, or hike.  Go for a swim or bike ride.  Take an online exercise class or dance.  Play a game of touch football, basketball, or soccer.  Play tennis, pickleball, or racquetball.  Climb stairs.  Even some household chores can be aerobic. Just do them at a faster pace. Raking or mowing the lawn, sweeping the garage, and vacuuming and cleaning your home all can help get your heart rate up.  Strengthen your muscles during the week. You don't have to lift heavy weights or grow big, bulky muscles to get stronger. Doing a few simple activities that make your muscles work against, or \"resist,\" something can help you get stronger. Aim for at least twice a week.  For example, you can:  Do push-ups or sit-ups, which use your own body weight as resistance.  Lift weights or dumbbells or use stretch bands at home or in a gym or community center.  Stretch your muscles often. Stretching will help you as you become more active. It can help you stay flexible and loosen tight muscles. It can also help improve your balance and posture and can be a great way to relax.  Be sure to stretch the muscles you'll be using when you work out. It's best " "to warm your muscles slightly before you stretch them. Walk or do some other light aerobic activity for a few minutes. Then start stretching.  When you stretch your muscles:  Do it slowly. Stretching is not about going fast or making sudden movements.  Don't push or bounce during a stretch.  Hold each stretch for at least 15 to 30 seconds, if you can. You should feel a stretch in the muscle, but not pain.  Breathe out as you do the stretch. Then breathe in as you hold the stretch. Don't hold your breath.  If you're worried about how more activity might affect your health, have a checkup before you start. Follow any special advice your doctor gives you for getting a smart start.  Where can you learn more?  Go to https://www.[employer].net/patiented  Enter W332 in the search box to learn more about \"Learning About Being Physically Active.\"  Current as of: June 6, 2023               Content Version: 13.8    7291-3339 [employer].   Care instructions adapted under license by your healthcare professional. If you have questions about a medical condition or this instruction, always ask your healthcare professional. [employer] disclaims any warranty or liability for your use of this information.      Eating Healthy Foods: Care Instructions  With every meal, you can make healthy food choices. Try to eat a variety of fruits, vegetables, whole grains, lean proteins, and low-fat dairy products. This can help you get the right balance of nutrients, including vitamins and minerals. Small changes add up over time. You can start by adding one healthy food to your meals each day.    Try to make half your plate fruits and vegetables, one-fourth whole grains, and one-fourth lean proteins. Try including dairy with your meals.   Eat more fruits and vegetables. Try to have them with most meals and snacks.   Foods for healthy eating    Fruits    These can be fresh, frozen, canned, or dried.  Try to choose whole " "fruit rather than fruit juice.  Eat a variety of colors.    Vegetables    These can be fresh, frozen, canned, or dried.  Beans, peas, and lentils count too.    Whole grains    Choose whole-grain breads, cereals, and noodles.  Try brown rice.    Lean proteins    These can include lean meat, poultry, fish, and eggs.  You can also have tofu, beans, peas, lentils, nuts, and seeds.    Dairy    Try milk, yogurt, and cheese.  Choose low-fat or fat-free when you can.  If you need to, use lactose-free milk or fortified plant-based milk products, such as soy milk.    Water    Drink water when you're thirsty.  Limit sugar-sweetened drinks, including soda, fruit drinks, and sports drinks.  Where can you learn more?  Go to https://www.[employer].net/patiented  Enter T756 in the search box to learn more about \"Eating Healthy Foods: Care Instructions.\"  Current as of: February 28, 2023               Content Version: 13.8    6456-9231 [employer].   Care instructions adapted under license by your healthcare professional. If you have questions about a medical condition or this instruction, always ask your healthcare professional. [employer] disclaims any warranty or liability for your use of this information.      Nutrition for Older Adults: Care Instructions  Overview     Good nutrition is important at any age. But it is especially important for older adults. Eating a healthy diet helps keep your body strong. And it can help lower your risk for disease.  As you get older, your body needs more of certain nutrients. These include vitamin B12, calcium, and vitamin D. But it may be harder for you to get these and other important nutrients. This could be for many reasons. You may not feel as hungry as you used to. Or you could have problems with your teeth or mouth that make it hard to chew. Or you may not enjoy planning and preparing meals, especially if you live alone.  Talk with your doctor if you want help " getting the most nutrition from what you eat. The doctor may have you work with a dietitian to help you plan meals.  Follow-up care is a key part of your treatment and safety. Be sure to make and go to all appointments, and call your doctor if you are having problems. It's also a good idea to know your test results and keep a list of the medicines you take.  How can you care for yourself at home?  To stay healthy  Eat a variety of foods. The more you vary the foods you eat, the more vitamins, minerals, and other nutrients you get.  Take a multivitamin every day. Choose one with about 100% of the daily value (DV) for vitamins and minerals. Do not take more than 100% of the daily value for any vitamin or mineral unless your doctor tells you to. Talk with your doctor if you are not sure which multivitamin is right for you.  Eat lots of fruits and vegetables. Fresh, frozen, or no-salt canned vegetables and fruits in their own juice or light syrup are good choices.  Include foods that are high in vitamin B12 in your diet. Good choices are fortified breakfast cereal, nonfat or low-fat milk and other dairy products, meat, poultry, fish, and eggs.  Get enough calcium and vitamin D. Good choices include nonfat or low-fat milk, cheese, and yogurt. Other good options are tofu, orange juice with added calcium, and some leafy green vegetables, such as curtis greens and kale. If you don't use milk products, talk to your doctor about calcium and vitamin D supplements.  Eat protein foods every day. Good choices include lean meat, fish, poultry, eggs, and cheese. Other good options are cooked beans, peanut butter, and nuts and seeds.  Choose whole grains for half of the grains you eat. Look for 100% whole wheat bread, whole-grain cereals, brown rice, and other whole grains.  If you have constipation  Eat high-fiber foods every day. These include fruits, vegetables, cooked dried beans, and whole grains.  Drink plenty of fluids. If  you have kidney, heart, or liver disease and have to limit fluids, talk with your doctor before you increase the amount of fluids you drink.  Ask your doctor if stool softeners may help keep your bowels regular.  If you have mouth problems that make chewing hard  Pick canned or cooked fruits and vegetables. These are often softer.  Chop or shred meat, poultry, and fish. Add sauce or gravy to the meat to help keep it moist.  Pick other protein foods that are soft. These include cheese, peanut butter, cooked beans, cottage cheese, and eggs.  If you have trouble shopping for yourself  Ask a local food store to deliver groceries to your home.  Contact a volunteer center and ask for help.  Ask a family member or neighbor to help you.  If you have trouble preparing meals  If you are able, take a cooking class.  Use a microwave oven to cook TV dinners and other frozen or prepared foods.  Take part in group meal programs. You can find these through senior citizen programs.  Have meals brought to your home. Your community may offer programs that deliver meals, such as Meals on Wheels.  If your appetite is poor  Try eating smaller amounts of food more often. For example, eat 4 or 5 small meals a day instead of 1 or 2 large meals.  Eat with family and friends. Or take part in group meal programs offered through volunteer programs. Eating with others may help your appetite. And it helps you be more social.  Ask your doctor if your medicines could cause appetite or taste problems. If so, ask about changing medicines.  Add spices and herbs to increase the flavor of food.  If you think you are depressed, ask your doctor for help. Depression can affect your appetite. And it can make it hard to do everyday activities like grocery shopping and making meals. Treatment can help.  When should you call for help?  Watch closely for changes in your health, and be sure to contact your doctor if you have any problems.  Where can you learn  "more?  Go to https://www.[employer].net/patiented  Enter L643 in the search box to learn more about \"Nutrition for Older Adults: Care Instructions.\"  Current as of: February 26, 2023               Content Version: 13.8    2212-6688 [employer].   Care instructions adapted under license by your healthcare professional. If you have questions about a medical condition or this instruction, always ask your healthcare professional. [employer] disclaims any warranty or liability for your use of this information.      Hearing Loss: Care Instructions  Overview     Hearing loss is a sudden or slow decrease in how well you hear. It can range from slight to profound. Permanent hearing loss can occur with aging. It also can happen when you are exposed long-term to loud noise. Examples include listening to loud music, riding motorcycles, or being around other loud machines.  Hearing loss can affect your work and home life. It can make you feel lonely or depressed. You may feel that you have lost your independence. But hearing aids and other devices can help you hear better and feel connected to others.  Follow-up care is a key part of your treatment and safety. Be sure to make and go to all appointments, and call your doctor if you are having problems. It's also a good idea to know your test results and keep a list of the medicines you take.  How can you care for yourself at home?  Avoid loud noises whenever possible. This helps keep your hearing from getting worse.  Always wear hearing protection around loud noises.  Wear a hearing aid as directed.  A professional can help you pick a hearing aid that will work best for you.  You can also get hearing aids over the counter for mild to moderate hearing loss.  Have hearing tests as your doctor suggests. They can show whether your hearing has changed. Your hearing aid may need to be adjusted.  Use other devices as needed. These may include:  Telephone " "amplifiers and hearing aids that can connect to a television, stereo, radio, or microphone.  Devices that use lights or vibrations. These alert you to the doorbell, a ringing telephone, or a baby monitor.  Television closed-captioning. This shows the words at the bottom of the screen. Most new TVs can do this.  TTY (text telephone). This lets you type messages back and forth on the telephone instead of talking or listening. These devices are also called TDD. When messages are typed on the keyboard, they are sent over the phone line to a receiving TTY. The message is shown on a monitor.  Use text messaging, social media, and email if it is hard for you to communicate by telephone.  Try to learn a listening technique called speechreading. It is not lipreading. You pay attention to people's gestures, expressions, posture, and tone of voice. These clues can help you understand what a person is saying. Face the person you are talking to, and have them face you. Make sure the lighting is good. You need to see the other person's face clearly.  Think about counseling if you need help to adjust to your hearing loss.  When should you call for help?  Watch closely for changes in your health, and be sure to contact your doctor if:    You think your hearing is getting worse.     You have new symptoms, such as dizziness or nausea.   Where can you learn more?  Go to https://www.[employer].net/patiented  Enter R798 in the search box to learn more about \"Hearing Loss: Care Instructions.\"  Current as of: February 28, 2023               Content Version: 13.8    9438-7374 [employer].   Care instructions adapted under license by your healthcare professional. If you have questions about a medical condition or this instruction, always ask your healthcare professional. [employer] disclaims any warranty or liability for your use of this information.      Relationships for Good Health  Relationships are important " for our health and happiness. Social isolation, loneliness and lack of support are bad for your health. Studies show that loneliness can harm health and limit your life span as much as high blood pressure and smoking.   Take some time to reflect on your relationships. Then answer these questions:  Are there people in your life that cause you stress or drain your energy? What can you do to set limits?  ________________________________________________________________________________________________________________________________________________________________________________________________________________________________________________________________________________________________________________________________________________  Who do you enjoy spending time with? Who can you go to for support?  ________________________________________________________________________________________________________________________________________________________________________________________________________________________________________________________________________________________________________________________________________________  What can you do to improve your relationships with others?  __________________________________________________________________________________________________________________________________________________________________________________________________________________  ______________________________________________________________________________________________________________________________  What do you like most about your relationships with  others?  ________________________________________________________________________________________________________________________________________________________________________________________________________________________________________________________________________________________________________________________________________________  My goal: ______________________________________________________________________  I will ______________________________________________________________________________________________________________________________________________________________________________________________    For informational purposes only. Not to replace the advice of your health care provider. Copyright   2018 [facility]. All rights reserved. Clinically reviewed by [facility]  Team. Epuramat 058665 - Rev 04/21.     A Good Support System Is Important (02:04)  Your health professional recommends that you watch this short online health video.  Learn how to connect with family, friends, and others for support with health issues.  Purpose:  Teaches how to reach out to family, friends, and groups for support when managing a health problem.  Goal:  User will learn how a good support system can improve confidence to manage health and live well.     How to watch the video    Scan the QR code   OR Visit the website    https://link.[employer].net/r/Gmeaswcu6ysgg   Current as of: June 25, 2023               Content Version: 13.8    4020-5304 [employer].   Care instructions adapted under license by your healthcare professional. If you have questions about a medical condition or this instruction, always ask your healthcare professional. [employer] disclaims any warranty or liability for your use of this information.      Learning About Stress  What is stress?     Stress is your body's response to a hard situation. Your body can have a physical, emotional, or  mental response. Stress is a fact of life for most people, and it affects everyone differently. What causes stress for you may not be stressful for someone else.  A lot of things can cause stress. You may feel stress when you go on a job interview, take a test, or run a race. This kind of short-term stress is normal and even useful. It can help you if you need to work hard or react quickly. For example, stress can help you finish an important job on time.  Long-term stress is caused by ongoing stressful situations or events. Examples of long-term stress include long-term health problems, ongoing problems at work, or conflicts in your family. Long-term stress can harm your health.  How does stress affect your health?  When you are stressed, your body responds as though you are in danger. It makes hormones that speed up your heart, make you breathe faster, and give you a burst of energy. This is called the fight-or-flight stress response. If the stress is over quickly, your body goes back to normal and no harm is done.  But if stress happens too often or lasts too long, it can have bad effects. Long-term stress can make you more likely to get sick, and it can make symptoms of some diseases worse. If you tense up when you are stressed, you may develop neck, shoulder, or low back pain. Stress is linked to high blood pressure and heart disease.  Stress also harms your emotional health. It can make you molina, tense, or depressed. Your relationships may suffer, and you may not do well at work or school.  What can you do to manage stress?  You can try these things to help manage stress:   Do something active. Exercise or activity can help reduce stress. Walking is a great way to get started. Even everyday activities such as housecleaning or yard work can help.  Try yoga or geronimo chi. These techniques combine exercise and meditation. You may need some training at first to learn them.  Do something you enjoy. For example,  "listen to music or go to a movie. Practice your hobby or do volunteer work.  Meditate. This can help you relax, because you are not worrying about what happened before or what may happen in the future.  Do guided imagery. Imagine yourself in any setting that helps you feel calm. You can use online videos, books, or a teacher to guide you.  Do breathing exercises. For example:  From a standing position, bend forward from the waist with your knees slightly bent. Let your arms dangle close to the floor.  Breathe in slowly and deeply as you return to a standing position. Roll up slowly and lift your head last.  Hold your breath for just a few seconds in the standing position.  Breathe out slowly and bend forward from the waist.  Let your feelings out. Talk, laugh, cry, and express anger when you need to. Talking with supportive friends or family, a counselor, or a harlan leader about your feelings is a healthy way to relieve stress. Avoid discussing your feelings with people who make you feel worse.  Write. It may help to write about things that are bothering you. This helps you find out how much stress you feel and what is causing it. When you know this, you can find better ways to cope.  What can you do to prevent stress?  You might try some of these things to help prevent stress:  Manage your time. This helps you find time to do the things you want and need to do.  Get enough sleep. Your body recovers from the stresses of the day while you are sleeping.  Get support. Your family, friends, and community can make a difference in how you experience stress.  Limit your news feed. Avoid or limit time on social media or news that may make you feel stressed.  Do something active. Exercise or activity can help reduce stress. Walking is a great way to get started.  Where can you learn more?  Go to https://www.healthwise.net/patiented  Enter N032 in the search box to learn more about \"Learning About Stress.\"  Current as of: " February 26, 2023               Content Version: 13.8    5174-0222 [employer].   Care instructions adapted under license by your healthcare professional. If you have questions about a medical condition or this instruction, always ask your healthcare professional. [employer] disclaims any warranty or liability for your use of this information.      Bladder Training: Care Instructions  Your Care Instructions     Bladder training is used to treat urge incontinence and stress incontinence. Urge incontinence means that the need to urinate comes on so fast that you can't get to a toilet in time. Stress incontinence means that you leak urine because of pressure on your bladder. For example, it may happen when you laugh, cough, or lift something heavy.  Bladder training can increase how long you can wait before you have to urinate. It can also help your bladder hold more urine. And it can give you better control over the urge to urinate.  It is important to remember that bladder training takes a few weeks to a few months to make a difference. You may not see results right away, but don't give up.  Follow-up care is a key part of your treatment and safety. Be sure to make and go to all appointments, and call your doctor if you are having problems. It's also a good idea to know your test results and keep a list of the medicines you take.  How can you care for yourself at home?  Work with your doctor to come up with a bladder training program that is right for you. You may use one or more of the following methods.  Delayed urination  In the beginning, try to keep from urinating for 5 minutes after you first feel the need to go.  While you wait, take deep, slow breaths to relax. Kegel exercises can also help you delay the need to go to the bathroom.  After some practice, when you can easily wait 5 minutes to urinate, try to wait 10 minutes before you urinate.  Slowly increase the waiting period until  "you are able to control when you have to urinate.  Scheduled urination  Empty your bladder when you first wake up in the morning.  Schedule times throughout the day when you will urinate.  Start by going to the bathroom every hour, even if you don't need to go.  Slowly increase the time between trips to the bathroom.  When you have found a schedule that works well for you, keep doing it.  If you wake up during the night and have to urinate, do it. Apply your schedule to waking hours only.  Kegel exercises  These tighten and strengthen pelvic muscles, which can help you control the flow of urine. (If doing these exercises causes pain, stop doing them and talk with your doctor.) To do Kegel exercises:  Squeeze your muscles as if you were trying not to pass gas. Or squeeze your muscles as if you were stopping the flow of urine. Your belly, legs, and buttocks shouldn't move.  Hold the squeeze for 3 seconds, then relax for 5 to 10 seconds.  Start with 3 seconds, then add 1 second each week until you are able to squeeze for 10 seconds.  Repeat the exercise 10 times a session. Do 3 to 8 sessions a day.  When should you call for help?  Watch closely for changes in your health, and be sure to contact your doctor if:    Your incontinence is getting worse.     You do not get better as expected.   Where can you learn more?  Go to https://www.[employer].net/patiented  Enter V684 in the search box to learn more about \"Bladder Training: Care Instructions.\"  Current as of: February 28, 2023               Content Version: 13.8    [id number] [employer].   Care instructions adapted under license by your healthcare professional. If you have questions about a medical condition or this instruction, always ask your healthcare professional. [employer] disclaims any warranty or liability for your use of this information.        Chronic Pain: Care Instructions  Your Care Instructions     Chronic pain is pain that " lasts a long time (months or even years) and may or may not have a clear cause. It is different from acute pain, which usually does have a clear cause--like an injury or illness--and gets better over time. Chronic pain:  Lasts over time but may vary from day to day.  Does not go away despite efforts to end it.  May disrupt your sleep and lead to fatigue.  May cause depression or anxiety.  May make your muscles tense, causing more pain.  Can disrupt your work, hobbies, home life, and relationships with friends and family.  Chronic pain is a very real condition. It is not just in your head. Treatment can help and usually includes several methods used together, such as medicines, physical therapy, exercise, and other treatments. Learning how to relax and changing negative thought patterns can also help you cope.  Chronic pain is complex. Taking an active role in your treatment will help you better manage your pain. Tell your doctor if you have trouble dealing with your pain. You may have to try several things before you find what works best for you.  Follow-up care is a key part of your treatment and safety. Be sure to make and go to all appointments, and call your doctor if you are having problems. It's also a good idea to know your test results and keep a list of the medicines you take.  How can you care for yourself at home?  Pace yourself. Break up large jobs into smaller tasks. Save harder tasks for days when you have less pain, or go back and forth between hard tasks and easier ones. Take rest breaks.  Relax, and reduce stress. Relaxation techniques such as deep breathing or meditation can help.  Keep moving. Gentle, daily exercise can help reduce pain over the long run. Try low- or no-impact exercises such as walking, swimming, and stationary biking. Do stretches to stay flexible.  Try heat, cold packs, and massage.  Get enough sleep. Chronic pain can make you tired and drain your energy. Talk with your doctor  if you have trouble sleeping because of pain.  Think positive. Your thoughts can affect your pain level. Do things that you enjoy to distract yourself when you have pain instead of focusing on the pain. See a movie, read a book, listen to music, or spend time with a friend.  If you think you are depressed, talk to your doctor about treatment.  Keep a daily pain diary. Record how your moods, thoughts, sleep patterns, activities, and medicine affect your pain. You may find that your pain is worse during or after certain activities or when you are feeling a certain emotion. Having a record of your pain can help you and your doctor find the best ways to treat your pain.  Take pain medicines exactly as directed.  If the doctor gave you a prescription medicine for pain, take it as prescribed.  If you are not taking a prescription pain medicine, ask your doctor if you can take an over-the-counter medicine.  Reducing constipation caused by pain medicine  Talk to your doctor about a laxative. If a laxative doesn't work, your doctor may suggest a prescription medicine.  Include fruits, vegetables, beans, and whole grains in your diet each day. These foods are high in fiber.  If your doctor recommends it, get more exercise. Walking is a good choice. Bit by bit, increase the amount you walk every day. Try for at least 30 minutes on most days of the week.  Schedule time each day for a bowel movement. A daily routine may help. Take your time and do not strain when having a bowel movement.  When should you call for help?   Call your doctor now or seek immediate medical care if:    Your pain gets worse or is out of control.     You feel down or blue, or you do not enjoy things like you once did. You may be depressed, which is common in people with chronic pain. Depression can be treated.     You have vomiting or cramps for more than 2 hours.   Watch closely for changes in your health, and be sure to contact your doctor if:    You  "cannot sleep because of pain.     You are very worried or anxious about your pain.     You have trouble taking your pain medicine.     You have any concerns about your pain medicine.     You have trouble with bowel movements, such as:  No bowel movement in 3 days.  Blood in the anal area, in your stool, or on the toilet paper.  Diarrhea for more than 24 hours.   Where can you learn more?  Go to https://www.[employer].net/patiented  Enter N004 in the search box to learn more about \"Chronic Pain: Care Instructions.\"  Current as of: July 11, 2023               Content Version: 13.8    [id number] [employer].   Care instructions adapted under license by your healthcare professional. If you have questions about a medical condition or this instruction, always ask your healthcare professional. [employer] disclaims any warranty or liability for your use of this information.      Preventive Care Advice   This is general advice given by our system to help you stay healthy. However, your care team may have specific advice just for you. Please talk to your care team about your preventive care needs.  Nutrition  Eat 5 or more servings of fruits and vegetables each day.  Try wheat bread, brown rice and whole grain pasta (instead of white bread, rice, and pasta).  Get enough calcium and vitamin D. Check the label on foods and aim for 100% of the RDA (recommended daily allowance).  Lifestyle  Exercise at least 150 minutes each week  (30 minutes a day, 5 days a week).  Do muscle strengthening activities 2 days a week. These help control your weight and prevent disease.  No smoking.  Wear sunscreen to prevent skin cancer.  Have a dental exam and cleaning every 6 months.  Yearly exams  See your health care team every year to talk about:  Any changes in your health.  Any medicines your care team has prescribed.  Preventive care, family planning, and ways to prevent chronic diseases.  Shots (vaccines)   HPV " shots (up to age 26), if you've never had them before.  Hepatitis B shots (up to age 59), if you've never had them before.  COVID-19 shot: Get this shot when it's due.  Flu shot: Get a flu shot every year.  Tetanus shot: Get a tetanus shot every 10 years.  Pneumococcal, hepatitis A, and RSV shots: Ask your care team if you need these based on your risk.  Shingles shot (for age 50 and up)  General health tests  Diabetes screening:  Starting at age 35, Get screened for diabetes at least every 3 years.  If you are younger than age 35, ask your care team if you should be screened for diabetes.  Cholesterol test: At age 39, start having a cholesterol test every 5 years, or more often if advised.  Bone density scan (DEXA): At age 50, ask your care team if you should have this scan for osteoporosis (brittle bones).  Hepatitis C: Get tested at least once in your life.  STIs (sexually transmitted infections)  Before age 24: Ask your care team if you should be screened for STIs.  After age 24: Get screened for STIs if you're at risk. You are at risk for STIs (including HIV) if:  You are sexually active with more than one person.  You don't use condoms every time.  You or a partner was diagnosed with a sexually transmitted infection.  If you are at risk for HIV, ask about PrEP medicine to prevent HIV.  Get tested for HIV at least once in your life, whether you are at risk for HIV or not.  Cancer screening tests  Cervical cancer screening: If you have a cervix, begin getting regular cervical cancer screening tests starting at age 21.  Breast cancer scan (mammogram): If you've ever had breasts, begin having regular mammograms starting at age 40. This is a scan to check for breast cancer.  Colon cancer screening: It is important to start screening for colon cancer at age 45.  Have a colonoscopy test every 10 years (or more often if you're at risk) Or, ask your provider about stool tests like a FIT test every year or Cologuard  test every 3 years.  To learn more about your testing options, visit:   [email or web address].  For help making a decision, visit:   https://bit.ly/hj64758.  Prostate cancer screening test: If you have a prostate, ask your care team if a prostate cancer screening test (PSA) at age 55 is right for you.  Lung cancer screening: If you are a current or former smoker ages 50 to 80, ask your care team if ongoing lung cancer screenings are right for you.  For informational purposes only. Not to replace the advice of your health care provider. Copyright   2023 [facility]. All rights reserved. Clinically reviewed by the [facility]. [employer] [id number] - REV 01/24.

## 2024-02-13 NOTE — PROGRESS NOTES
Preventive Care Visit  [facility]  Geovany Perkins MD, Family Medicine  Feb 13, 2024      Assessment & Plan     Encounter for Medicare annual wellness exam      Severe obesity (BMI 35.0-39.9) with comorbidity (H)  Admits to not eating real well and I recommended less carbohydrates especially with his diabetes history.  Activity as tolerated    Controlled type 2 diabetes mellitus without complication, without long-term current use of insulin (H)  Lab Results   Component Value Date    A1C 7.5 (H) 01/22/2024    A1C 7.3 (H) 05/26/2021   Fair control, could improve his diet, continue medications.  Previously was on Rybelsus but had donut hole and became too expensive.  - glimepiride (AMARYL) 4 MG tablet  Dispense: 180 tablet; Refill: 4  - metFORMIN (GLUCOPHAGE) 1000 MG tablet  Dispense: 180 tablet; Refill: 4  - pioglitazone (ACTOS) 30 MG tablet  Dispense: 90 tablet; Refill: 4  - Hemoglobin A1c    Hypertension goal BP (blood pressure) < 130/80  Controlled - continue medication(s).  - lisinopril (ZESTRIL) 10 MG tablet  Dispense: 90 tablet; Refill: 4    Hyperlipidemia LDL goal <70  Controlled - continue medication(s).  - simvastatin (ZOCOR) 40 MG tablet  Dispense: 90 tablet; Refill: 4    Diabetic polyneuropathy associated with type 2 diabetes mellitus (H)  Monitors help with feet and no sores.  Exam okay.    Erectile dysfunction, unspecified erectile dysfunction type  Has issues with pain in the pelvis when he does not ejaculate occasionally and would like Viagra trial.  He should not take it within 4 hours of taking tamsulosin.  - sildenafil (VIAGRA) 50 MG tablet  Dispense: 30 tablet; Refill: 11    Slowing of urinary stream  Decreased urinary flow over the last few years.  He would like to try something to help with urine flow:  - tamsulosin (FLOMAX) 0.4 MG capsule  Dispense: 90 capsule; Refill: 3      COUNSELING:  Reviewed preventive health counseling, as reflected in patient  "instructions      BMI  Estimated body mass index is 37.86 kg/m  as calculated from the following:    Height as of this encounter: 1.727 m (5' 8\").    Weight as of this encounter: 112.9 kg (249 lb).   Weight management plan: Discussed healthy diet and exercise guidelines    Counseling  Appropriate preventive services were discussed with this patient, including applicable screening as appropriate for fall prevention, nutrition, physical activity, Tobacco-use cessation, weight loss and cognition.  Checklist reviewing preventive services available has been given to the patient.  Reviewed patient's diet, addressing concerns and/or questions.   Patient is at risk for social isolation and has been provided with information about the benefit of social connection.   The patient was instructed to see the dentist every 6 months.   The patient was provided with written information regarding signs of hearing loss.   Information on urinary incontinence and treatment options given to patient.   I have reviewed Opioid Use Disorder and Substance Use Disorder risk factors and made any needed referrals.           No follow-ups on file.    Follow-up Visit   Expected date:  Aug 13, 2024 (Approximate)      Follow Up Appointment Details:     Follow-up with whom?: Other Primary Care Services    Follow-Up for what?: Lab Visit    How?: In Person    Is this an as-needed follow-up?: No             Follow-up Visit   Expected date:  Feb 19, 2025 (Approximate)      Follow Up Appointment Details:     Follow-up with whom?: PCP    Follow-Up for what?: Medicare Wellness    Welcome or Annual?: Annual Wellness    How?: In Person                         Dinh Perkins MD     [address] [address]  [email or web address]     Office: [phone]         Lulú Lagos is a 74 year old, presenting for the following:  Physical        2/13/2024     3:21 PM   Additional Questions   Roomed by Ila CHATMAN CMA "         Health Care Directive  Patient does not have a Health Care Directive or Living Will: Discussed advance care planning with patient; however, patient declined at this time.    HPI      Diabetes Follow-up    How often are you checking your blood sugar? Not at all  What concerns do you have today about your diabetes? None   Do you have any of these symptoms? (Select all that apply)  Numbness in feet and Burning in feet          Hyperlipidemia Follow-Up    Are you regularly taking any medication or supplement to lower your cholesterol?   Yes- simvastatin  Are you having muscle aches or other side effects that you think could be caused by your cholesterol lowering medication?  No    Hypertension Follow-up    Do you check your blood pressure regularly outside of the clinic? No   Are you following a low salt diet? No  Are your blood pressures ever more than 140 on the top number (systolic) OR more   than 90 on the bottom number (diastolic), for example 140/90? N/A    BP Readings from Last 2 Encounters:   02/13/24 126/74   07/27/23 120/62     Hemoglobin A1C (%)   Date Value   01/22/2024 7.5 (H)   07/27/2023 7.5 (H)   05/26/2021 7.3 (H)   02/26/2021 9.2 (H)     LDL Cholesterol Calculated (mg/dL)   Date Value   01/22/2024 90   12/07/2022 100   08/04/2020 91   09/12/2019 82           2/13/2024   General Health   How would you rate your overall physical health? (!) FAIR   Feel stress (tense, anxious, or unable to sleep) To some extent   (!) STRESS CONCERN      2/13/2024   Nutrition   Diet: I don't know         2/13/2024   Exercise   Days per week of moderate/strenous exercise 5 days   Average minutes spent exercising at this level 10 min         2/13/2024   Social Factors   Frequency of gathering with friends or relatives Never   Worry food won't last until get money to buy more No   Food not last or not have enough money for food? No   Do you have housing?  Yes   Are you worried about losing your housing? No   Lack of  transportation? No   Unable to get utilities (heat,electricity)? No   (!) SOCIAL CONNECTIONS CONCERN      2024   Fall Risk   Fallen 2 or more times in the past year? No   Trouble with walking or balance? No          2024   Activities of Daily Living- Home Safety   Needs help with the following daily activites None of the above   Safety concerns in the home None of the above         2024   Dental   Dentist two times every year? (!) NO         2024   Hearing Screening   Hearing concerns? (!) IT'S HARD TO FOLLOW A CONVERSATION IN A NOISY RESTAURANT OR CROWDED ROOM.         2024   Driving Risk Screening   Patient/family members have concerns about driving No         2024   General Alertness/Fatigue Screening   Have you been more tired than usual lately? No         2024   Urinary Incontinence Screening   Bothered by leaking urine in past 6 months Yes     Today's PHQ-2 Score:       2024     3:18 PM   PHQ-2 (  [employer])   Q1: Little interest or pleasure in doing things 1   Q2: Feeling down, depressed or hopeless 1   PHQ-2 Score 2   Q1: Little interest or pleasure in doing things Several days   Q2: Feeling down, depressed or hopeless Several days   PHQ-2 Score 2           2024   Substance Use   Alcohol more than 3/day or more than 7/wk No   Do you have a current opioid prescription? (!) YES   How severe/bad is pain from 1 to 10? 2/10   Do you use any other substances recreationally? No        Social History     Tobacco Use    Smoking status: Former     Packs/day: 1.00     Years: 25.00     Additional pack years: 0.00     Total pack years: 25.00     Types: Cigarettes     Quit date: 2012     Years since quittin.7    Smokeless tobacco: Never    Tobacco comments:     1  PPD   Vaping Use    Vaping Use: Never used   Substance Use Topics    Alcohol use: No    Drug use: No           2024   AAA Screening   Family history of Abdominal Aortic Aneurysm (AAA)? (!) YES  already had screening in 2021 due for repeat in 2026   The 10-year ASCVD risk score (Olivia BASS, et al., 2019) is: 40.2%    Values used to calculate the score:      Age: 74 years      Sex: Male      Is Non- : No      Diabetic: Yes      Tobacco smoker: No      Systolic Blood Pressure: 126 mmHg      Is BP treated: Yes      HDL Cholesterol: 37 mg/dL      Total Cholesterol: 156 mg/dL          Reviewed and updated as needed this visit by Provider   Tobacco  Allergies  Meds  Problems  Med Hx  Surg Hx  Fam Hx              Current providers sharing in care for this patient include:  Patient Care Team:  Geovany Perkins MD as PCP - General (Family Practice)  Geovany Perkins MD as Assigned PCP  Yen Lawson RD as Diabetes Educator (Dietitian, Registered)    The following health maintenance items are reviewed in Epic and correct as of today:  Health Maintenance   Topic Date Due    RSV VACCINE (Pregnancy & 60+) (1 - 1-dose 60+ series) Never done    ZOSTER IMMUNIZATION (3 of 3) 11/25/2021    EYE EXAM  05/26/2024    A1C  07/22/2024    CBC  07/27/2024    ANNUAL REVIEW OF HM ORDERS  01/19/2025    BMP  01/22/2025    CMP  01/22/2025    LIPID  01/22/2025    MICROALBUMIN  01/22/2025    PSA  01/22/2025    MEDICARE ANNUAL WELLNESS VISIT  02/13/2025    DIABETIC FOOT EXAM  02/13/2025    FALL RISK ASSESSMENT  02/13/2025    ADVANCE CARE PLANNING  11/23/2027    DTAP/TDAP/TD IMMUNIZATION (3 - Td or Tdap) 02/12/2028    HEPATITIS C SCREENING  Completed    PHQ-2 (once per calendar year)  Completed    INFLUENZA VACCINE  Completed    AORTIC ANEURYSM SCREENING (SYSTEM ASSIGNED)  Completed    IPV IMMUNIZATION  Aged Out    HPV IMMUNIZATION  Aged Out    MENINGITIS IMMUNIZATION  Aged Out    RSV MONOCLONAL ANTIBODY  Aged Out    Pneumococcal Vaccine: 65+ Years  Discontinued    COLORECTAL CANCER SCREENING  Discontinued    LUNG CANCER SCREENING  Discontinued    COVID-19 Vaccine  Discontinued        Objective    Exam  BP  "126/74   Pulse 79   Temp 98.1  F (36.7  C) (Tympanic)   Resp 16   Ht 1.727 m (5' 8\")   Wt 112.9 kg (249 lb)   SpO2 95%   BMI 37.86 kg/m     Estimated body mass index is 37.86 kg/m  as calculated from the following:    Height as of this encounter: 1.727 m (5' 8\").    Weight as of this encounter: 112.9 kg (249 lb).    Physical Exam  GENERAL: healthy, alert and no distress  EYES: Eyes grossly normal to inspection, PERRL and conjunctivae and sclerae normal  HENT: ear canals and TM's normal, nose and mouth without ulcers or lesions  NECK: no adenopathy, no asymmetry, masses, or scars and thyroid normal to palpation  RESP: lungs clear to auscultation - no rales, rhonchi or wheezes  BREAST: normal without masses, tenderness or nipple discharge and no palpable axillary masses or adenopathy  CV: regular rate and rhythm, normal S1 S2, no S3 or S4, no murmur, click or rub, no peripheral edema and peripheral pulses strong  ABDOMEN: soft, nontender, no hepatosplenomegaly, no masses and bowel sounds normal   (male): no symptoms - declined  MS: no gross musculoskeletal defects noted, no edema  SKIN: no suspicious lesions or rashes  NEURO: Normal strength and tone, mentation intact and speech normal  PSYCH: mentation appears normal, affect normal/bright  LYMPH: no cervical, supraclavicular, axillary, or inguinal adenopathy  RECTAL: declined exam  Diabetic foot exam: normal DP and PT pulses, no trophic changes or ulcerative lesions, and reduced sensation at soles         2/13/2024   Mini Cog   Clock Draw Score 2 Normal   3 Item Recall 0 objects recalled   Mini Cog Total Score 2           Signed Electronically by: Geovany Perkins MD    "

## 2024-06-21 ENCOUNTER — OFFICE VISIT (OUTPATIENT)
Dept: FAMILY MEDICINE | Facility: CLINIC | Age: 74
End: 2024-06-21
Payer: MEDICARE

## 2024-06-21 VITALS
TEMPERATURE: 97.9 F | DIASTOLIC BLOOD PRESSURE: 68 MMHG | RESPIRATION RATE: 18 BRPM | WEIGHT: 243 LBS | OXYGEN SATURATION: 96 % | HEART RATE: 59 BPM | BODY MASS INDEX: 36.95 KG/M2 | SYSTOLIC BLOOD PRESSURE: 138 MMHG

## 2024-06-21 DIAGNOSIS — R35.0 BENIGN PROSTATIC HYPERPLASIA WITH URINARY FREQUENCY: ICD-10-CM

## 2024-06-21 DIAGNOSIS — N40.1 BENIGN PROSTATIC HYPERPLASIA WITH URINARY FREQUENCY: ICD-10-CM

## 2024-06-21 DIAGNOSIS — E11.9 CONTROLLED TYPE 2 DIABETES MELLITUS WITHOUT COMPLICATION, WITHOUT LONG-TERM CURRENT USE OF INSULIN (H): Primary | ICD-10-CM

## 2024-06-21 PROCEDURE — 99214 OFFICE O/P EST MOD 30 MIN: CPT | Performed by: FAMILY MEDICINE

## 2024-06-21 RX ORDER — SULFAMETHOXAZOLE/TRIMETHOPRIM 800-160 MG
1 TABLET ORAL 2 TIMES DAILY
Qty: 28 TABLET | Refills: 0 | Status: SHIPPED | OUTPATIENT
Start: 2024-06-21 | End: 2024-07-05

## 2024-06-21 NOTE — PROGRESS NOTES
Assessment & Plan     Controlled type 2 diabetes mellitus without complication, without long-term current use of insulin (H)  stable    Benign prostatic hyperplasia with urinary frequency  No improvement since starting Flomax and maybe more prostate-type pain. Discussed that we could consider an increased dose of Flomax vs trial off and see how he feels. Declined prostate exam. Will try treating for prostatitis with Bactrim x 14 days and if any improvement, could consider longer course. Could also consider alternative medication to help with LUTS like finasteride. Will place referral to urology.   - Adult Urology  Referral; Future  - sulfamethoxazole-trimethoprim (BACTRIM DS) 800-160 MG tablet; Take 1 tablet by mouth 2 times daily for 14 days    Subjective   Demond is a 74 year old, presenting for the following health issues:  Prostate Problem (pain)        6/21/2024     1:45 PM   Additional Questions   Roomed by Mary     History of Present Illness       Reason for visit:  Prostate    He eats 0-1 servings of fruits and vegetables daily.He consumes 0 sweetened beverage(s) daily.He exercises with enough effort to increase his heart rate 9 or less minutes per day.  He exercises with enough effort to increase his heart rate 3 or less days per week.   He is taking medications regularly.       Medication Followup of Tamsulosin  Taking Medication as prescribed: yes  Side Effects:  not working and Prostate pain is much worse - discomfort is always there but worse with loose stools - this started to get much worse when he started taking Tamsulosin  Medication Helping Symptoms:  no    Denies any dysuria, hematuria. He has experienced some urgency and has noticed urine leakage at times. If he is going to have diarrhea, will notice more pain. If he has a regular bowel movement, no issues.         Review of Systems  Constitutional, HEENT, cardiovascular, pulmonary, gi and gu systems are negative, except as otherwise  noted.      Objective    /68   Pulse 59   Temp 97.9  F (36.6  C) (Oral)   Resp 18   Wt 110.2 kg (243 lb)   SpO2 96%   BMI 36.95 kg/m    Body mass index is 36.95 kg/m .  Physical Exam   GENERAL: alert and no distress  RECTAL (male): declined  PSYCH: mentation appears normal, affect normal/bright            Signed Electronically by: Daniel Power DO

## 2024-08-25 ENCOUNTER — HEALTH MAINTENANCE LETTER (OUTPATIENT)
Age: 74
End: 2024-08-25

## 2024-08-29 ENCOUNTER — TRANSFERRED RECORDS (OUTPATIENT)
Dept: HEALTH INFORMATION MANAGEMENT | Facility: CLINIC | Age: 74
End: 2024-08-29
Payer: MEDICARE

## 2024-12-09 DIAGNOSIS — R39.198 SLOWING OF URINARY STREAM: ICD-10-CM

## 2024-12-09 RX ORDER — TAMSULOSIN HYDROCHLORIDE 0.4 MG/1
0.4 CAPSULE ORAL DAILY
Qty: 90 CAPSULE | Refills: 3 | Status: SHIPPED | OUTPATIENT
Start: 2024-12-09

## 2025-01-14 ENCOUNTER — PATIENT OUTREACH (OUTPATIENT)
Dept: CARE COORDINATION | Facility: CLINIC | Age: 75
End: 2025-01-14
Payer: MEDICARE

## 2025-01-28 ENCOUNTER — PATIENT OUTREACH (OUTPATIENT)
Dept: CARE COORDINATION | Facility: CLINIC | Age: 75
End: 2025-01-28
Payer: MEDICARE

## 2025-03-01 ENCOUNTER — HEALTH MAINTENANCE LETTER (OUTPATIENT)
Age: 75
End: 2025-03-01

## 2025-03-08 DIAGNOSIS — E11.9 CONTROLLED TYPE 2 DIABETES MELLITUS WITHOUT COMPLICATION, WITHOUT LONG-TERM CURRENT USE OF INSULIN (H): ICD-10-CM

## 2025-03-08 DIAGNOSIS — E78.5 HYPERLIPIDEMIA LDL GOAL <70: ICD-10-CM

## 2025-03-09 RX ORDER — PIOGLITAZONE 30 MG/1
30 TABLET ORAL DAILY
Qty: 90 TABLET | Refills: 4 | Status: SHIPPED | OUTPATIENT
Start: 2025-03-09

## 2025-03-09 RX ORDER — GLIMEPIRIDE 4 MG/1
8 TABLET ORAL
Qty: 180 TABLET | Refills: 4 | Status: SHIPPED | OUTPATIENT
Start: 2025-03-09

## 2025-03-09 RX ORDER — SIMVASTATIN 40 MG
40 TABLET ORAL AT BEDTIME
Qty: 90 TABLET | Refills: 4 | Status: SHIPPED | OUTPATIENT
Start: 2025-03-09

## 2025-03-29 ENCOUNTER — HEALTH MAINTENANCE LETTER (OUTPATIENT)
Age: 75
End: 2025-03-29

## 2025-04-06 SDOH — HEALTH STABILITY: PHYSICAL HEALTH: ON AVERAGE, HOW MANY MINUTES DO YOU ENGAGE IN EXERCISE AT THIS LEVEL?: 10 MIN

## 2025-04-06 SDOH — HEALTH STABILITY: PHYSICAL HEALTH: ON AVERAGE, HOW MANY DAYS PER WEEK DO YOU ENGAGE IN MODERATE TO STRENUOUS EXERCISE (LIKE A BRISK WALK)?: 1 DAY

## 2025-04-06 ASSESSMENT — SOCIAL DETERMINANTS OF HEALTH (SDOH): HOW OFTEN DO YOU GET TOGETHER WITH FRIENDS OR RELATIVES?: ONCE A WEEK

## 2025-04-10 ENCOUNTER — OFFICE VISIT (OUTPATIENT)
Dept: FAMILY MEDICINE | Facility: CLINIC | Age: 75
End: 2025-04-10
Payer: MEDICARE

## 2025-04-10 VITALS
HEART RATE: 74 BPM | RESPIRATION RATE: 18 BRPM | HEIGHT: 67 IN | SYSTOLIC BLOOD PRESSURE: 162 MMHG | DIASTOLIC BLOOD PRESSURE: 68 MMHG | WEIGHT: 245 LBS | TEMPERATURE: 98.1 F | BODY MASS INDEX: 38.45 KG/M2 | OXYGEN SATURATION: 98 %

## 2025-04-10 DIAGNOSIS — Z13.0 SCREENING FOR DEFICIENCY ANEMIA: ICD-10-CM

## 2025-04-10 DIAGNOSIS — E11.42 DIABETIC POLYNEUROPATHY ASSOCIATED WITH TYPE 2 DIABETES MELLITUS (H): ICD-10-CM

## 2025-04-10 DIAGNOSIS — I10 HYPERTENSION GOAL BP (BLOOD PRESSURE) < 140/90: ICD-10-CM

## 2025-04-10 DIAGNOSIS — E66.01 MORBID OBESITY (H): ICD-10-CM

## 2025-04-10 DIAGNOSIS — E11.9 CONTROLLED TYPE 2 DIABETES MELLITUS WITHOUT COMPLICATION, WITHOUT LONG-TERM CURRENT USE OF INSULIN (H): ICD-10-CM

## 2025-04-10 DIAGNOSIS — I10 HYPERTENSION GOAL BP (BLOOD PRESSURE) < 130/80: ICD-10-CM

## 2025-04-10 DIAGNOSIS — Z12.5 SCREENING FOR PROSTATE CANCER: ICD-10-CM

## 2025-04-10 DIAGNOSIS — Z00.00 ENCOUNTER FOR MEDICARE ANNUAL WELLNESS EXAM: Primary | ICD-10-CM

## 2025-04-10 LAB
ALBUMIN SERPL BCG-MCNC: 4.4 G/DL (ref 3.5–5.2)
ALP SERPL-CCNC: 68 U/L (ref 40–150)
ALT SERPL W P-5'-P-CCNC: 29 U/L (ref 0–70)
ANION GAP SERPL CALCULATED.3IONS-SCNC: 10 MMOL/L (ref 7–15)
AST SERPL W P-5'-P-CCNC: 34 U/L (ref 0–45)
BILIRUB SERPL-MCNC: 0.6 MG/DL
BUN SERPL-MCNC: 13.6 MG/DL (ref 8–23)
CALCIUM SERPL-MCNC: 9.7 MG/DL (ref 8.8–10.4)
CHLORIDE SERPL-SCNC: 104 MMOL/L (ref 98–107)
CHOLEST SERPL-MCNC: 155 MG/DL
CREAT SERPL-MCNC: 0.97 MG/DL (ref 0.67–1.17)
CREAT UR-MCNC: 56.6 MG/DL
EGFRCR SERPLBLD CKD-EPI 2021: 82 ML/MIN/1.73M2
ERYTHROCYTE [DISTWIDTH] IN BLOOD BY AUTOMATED COUNT: 13.3 % (ref 10–15)
EST. AVERAGE GLUCOSE BLD GHB EST-MCNC: 134 MG/DL
FASTING STATUS PATIENT QL REPORTED: NORMAL
FASTING STATUS PATIENT QL REPORTED: NORMAL
GLUCOSE SERPL-MCNC: 92 MG/DL (ref 70–99)
HBA1C MFR BLD: 6.3 % (ref 0–5.6)
HCO3 SERPL-SCNC: 25 MMOL/L (ref 22–29)
HCT VFR BLD AUTO: 44.4 % (ref 40–53)
HDLC SERPL-MCNC: 41 MG/DL
HGB BLD-MCNC: 15.1 G/DL (ref 13.3–17.7)
LDLC SERPL CALC-MCNC: 98 MG/DL
MCH RBC QN AUTO: 30.9 PG (ref 26.5–33)
MCHC RBC AUTO-ENTMCNC: 34 G/DL (ref 31.5–36.5)
MCV RBC AUTO: 91 FL (ref 78–100)
MICROALBUMIN UR-MCNC: 22.7 MG/L
MICROALBUMIN/CREAT UR: 40.11 MG/G CR (ref 0–17)
NONHDLC SERPL-MCNC: 114 MG/DL
PLATELET # BLD AUTO: 181 10E3/UL (ref 150–450)
POTASSIUM SERPL-SCNC: 4.8 MMOL/L (ref 3.4–5.3)
PROT SERPL-MCNC: 7.1 G/DL (ref 6.4–8.3)
PSA SERPL DL<=0.01 NG/ML-MCNC: 1.8 NG/ML (ref 0–6.5)
RBC # BLD AUTO: 4.88 10E6/UL (ref 4.4–5.9)
SODIUM SERPL-SCNC: 139 MMOL/L (ref 135–145)
TRIGL SERPL-MCNC: 81 MG/DL
WBC # BLD AUTO: 5.8 10E3/UL (ref 4–11)

## 2025-04-10 RX ORDER — ACYCLOVIR 400 MG/1
1 TABLET ORAL
Qty: 9 EACH | Refills: 5 | Status: SHIPPED | OUTPATIENT
Start: 2025-04-10

## 2025-04-10 RX ORDER — LISINOPRIL 10 MG/1
10 TABLET ORAL DAILY
Qty: 90 TABLET | Refills: 4 | Status: SHIPPED | OUTPATIENT
Start: 2025-04-10 | End: 2025-04-10

## 2025-04-10 RX ORDER — ACYCLOVIR 400 MG/1
1 TABLET ORAL ONCE
Qty: 1 EACH | Refills: 0 | Status: SHIPPED | OUTPATIENT
Start: 2025-04-10 | End: 2025-04-10

## 2025-04-10 RX ORDER — LISINOPRIL 20 MG/1
20 TABLET ORAL DAILY
Qty: 90 TABLET | Refills: 3 | Status: SHIPPED | OUTPATIENT
Start: 2025-04-10

## 2025-04-10 RX ORDER — ACYCLOVIR 400 MG/1
1 TABLET ORAL ONCE
Qty: 1 EACH | Refills: 0 | OUTPATIENT
Start: 2025-04-10 | End: 2025-04-10

## 2025-04-10 NOTE — PROGRESS NOTES
"Preventive Care Visit  [facility] [place]  Angie Shane CNP, Nurse Practitioner - Family  Apr 10, 2025      Assessment & Plan     Encounter for Medicare annual wellness exam  - REVIEW OF HEALTH MAINTENANCE PROTOCOL ORDERS    Controlled type 2 diabetes mellitus without complication, without long-term current use of insulin (H)  Stable but not keeping track of numbers. See if CGM would be covered.  - HEMOGLOBIN A1C  - Lipid panel reflex to direct LDL Non-fasting  - Albumin Random Urine Quantitative with Creat Ratio  - Continuous Glucose  (DEXCOM G7 ) GEE  Dispense: 1 each; Refill: 0  - Continuous Glucose Sensor (DEXCOM G7 SENSOR) MISC  Dispense: 9 each; Refill: 5  - HEMOGLOBIN A1C  - Lipid panel reflex to direct LDL Non-fasting  - Albumin Random Urine Quantitative with Creat Ratio    Hypertension goal BP (blood pressure) < 140/90  - COMPREHENSIVE METABOLIC PANEL  - lisinopril (ZESTRIL) 20 MG tablet  Dispense: 90 tablet; Refill: 3  - COMPREHENSIVE METABOLIC PANEL    Screening for prostate cancer  - PROSTATE SPEC ANTIGEN SCREEN  - PROSTATE SPEC ANTIGEN SCREEN    Morbid obesity (H)  Consider GLP 1    Diabetic polyneuropathy associated with type 2 diabetes mellitus (H)  Try to order CGM-not checking sugars now due to inconvenience.  - Continuous Glucose  (DEXCOM G7 ) GEE  Dispense: 1 each; Refill: 0  - Continuous Glucose Sensor (DEXCOM G7 SENSOR) MISC  Dispense: 9 each; Refill: 5    Screening for deficiency anemia  - CBC with Platelets  - CBC with Platelets    Hypertension goal BP (blood pressure) < 130/80      Patient has been advised of split billing requirements and indicates understanding: Yes        BMI  Estimated body mass index is 38.37 kg/m  as calculated from the following:    Height as of this encounter: 1.702 m (5' 7\").    Weight as of this encounter: 111.1 kg (245 lb).   Weight management plan: Discussed healthy diet and exercise " guidelines    Counseling  Appropriate preventive services were addressed with this patient via screening, questionnaire, or discussion as appropriate for fall prevention, nutrition, physical activity, Tobacco-use cessation, social engagement, weight loss and cognition.  Checklist reviewing preventive services available has been given to the patient.  Reviewed patient's diet, addressing concerns and/or questions.   He is at risk for lack of exercise and has been provided with information to increase physical activity for the benefit of his well-being.   The patient was instructed to see the dentist every 6 months.   He is at risk for psychosocial distress and has been provided with information to reduce risk.   Discussed possible causes of fatigue. The patient was provided with written information regarding signs of hearing loss.   Information on urinary incontinence and treatment options given to patient.   I have reviewed Opioid Use Disorder and Substance Use Disorder risk factors and made any needed referrals.           Lulú Lagos is a 75 year old, presenting for the following:  Physical        4/10/2025     2:11 PM   Additional Questions   Roomed by Anali PEREIRA MA   Accompanied by Self       HPI    BP is abnormally high. All other vitals are w/in NL. Patient has no specific concerns to address for this visit.     Diabetes Follow-up    How often are you checking your blood sugar? Not at all  What concerns do you have today about your diabetes? None   Do you have any of these symptoms? (Select all that apply)  Numbness in feet, Burning in feet, Excessive thirst, and Weight gain          Hyperlipidemia Follow-Up    Are you regularly taking any medication or supplement to lower your cholesterol?   Yes- statin  Are you having muscle aches or other side effects that you think could be caused by your cholesterol lowering medication?  No    Hypertension Follow-up    Do you check your blood pressure regularly outside of  the clinic? No   Are you following a low salt diet? No  Are your blood pressures ever more than 140 on the top number (systolic) OR more   than 90 on the bottom number (diastolic), for example 140/90? N/A    BP Readings from Last 2 Encounters:   04/10/25 (!) 162/68   06/21/24 138/68     Hemoglobin A1C (%)   Date Value   04/10/2025 6.3 (H)   01/22/2024 7.5 (H)   05/26/2021 7.3 (H)   02/26/2021 9.2 (H)     LDL Cholesterol Calculated (mg/dL)   Date Value   01/22/2024 90   12/07/2022 100   08/04/2020 91   09/12/2019 82       Advance Care Planning  Patient does not have a Health Care Directive: Discussed advance care planning with patient; however, patient declined at this time.      4/6/2025   General Health   How would you rate your overall physical health? (!) POOR   Feel stress (tense, anxious, or unable to sleep) Rather much   (!) STRESS CONCERN      4/6/2025   Nutrition   Diet: Regular (no restrictions)         4/6/2025   Exercise   Days per week of moderate/strenous exercise 1 day   Average minutes spent exercising at this level 10 min   (!) EXERCISE CONCERN      4/6/2025   Social Factors   Frequency of gathering with friends or relatives Once a week   Worry food won't last until get money to buy more No   Food not last or not have enough money for food? No   Do you have housing? (Housing is defined as stable permanent housing and does not include staying ouside in a car, in a tent, in an abandoned building, in an overnight shelter, or couch-surfing.) No   Are you worried about losing your housing? No   Lack of transportation? No   Unable to get utilities (heat,electricity)? No   Want help with housing or utility concern? No   (!) HOUSING CONCERN PRESENT      4/10/2025   Fall Risk   Gait Speed Test (Document in seconds) 5   Gait Speed Test Interpretation Less than or equal to 5.00 seconds - PASS          4/6/2025   Activities of Daily Living- Home Safety   Needs help with the following daily activites None of the  above   Safety concerns in the home None of the above         2025   Dental   Dentist two times every year? (!) NO         2025   Hearing Screening   Hearing concerns? (!) I FEEL THAT PEOPLE ARE MUMBLING OR NOT SPEAKING CLEARLY.    (!) IT'S HARD TO FOLLOW A CONVERSATION IN A NOISY RESTAURANT OR CROWDED ROOM.       Multiple values from one day are sorted in reverse-chronological order         2025   Driving Risk Screening   Patient/family members have concerns about driving No         2025   General Alertness/Fatigue Screening   Have you been more tired than usual lately? (!) YES         2025   Urinary Incontinence Screening   Bothered by leaking urine in past 6 months Yes           Today's PHQ-2 Score:       4/10/2025     2:02 PM   PHQ-2 (  [employer])   Q1: Little interest or pleasure in doing things 0   Q2: Feeling down, depressed or hopeless 0   PHQ-2 Score 0    Q1: Little interest or pleasure in doing things Not at all   Q2: Feeling down, depressed or hopeless Not at all   PHQ-2 Score 0       Patient-reported           2025   Substance Use   Alcohol more than 3/day or more than 7/wk No   Do you have a current opioid prescription? (!) YES   How severe/bad is pain from 1 to 10? 0/10 (No Pain)   Do you use any other substances recreationally? No       Social History     Tobacco Use    Smoking status: Former     Current packs/day: 0.00     Average packs/day: 1 pack/day for 25.0 years (25.0 ttl pk-yrs)     Types: Cigarettes     Start date: 1987     Quit date: 2012     Years since quittin.8     Passive exposure: Past    Smokeless tobacco: Never    Tobacco comments:      PPD   Vaping Use    Vaping status: Never Used   Substance Use Topics    Alcohol use: No    Drug use: No       ASCVD Risk   The 10-year ASCVD risk score (Olivia BASS, et al., 2019) is: 58.1%    Values used to calculate the score:      Age: 75 years      Sex: Male      Is Non- :  No      Diabetic: Yes      Tobacco smoker: No      Systolic Blood Pressure: 162 mmHg      Is BP treated: Yes      HDL Cholesterol: 37 mg/dL      Total Cholesterol: 156 mg/dL            Reviewed and updated as needed this visit by Provider                    Past Medical History:   Diagnosis Date    Arthritis     Controlled type 2 diabetes mellitus without complication, without long-term current use of insulin (H) 06/20/2013    Diabetes (H)     Hyperlipidemia LDL goal <70     Hypertension goal BP (blood pressure) < 140/90     Obesity, unspecified     Prostatitis, unspecified     Sleep apnea     no cpap    Unspecified sinusitis (chronic)      Past Surgical History:   Procedure Laterality Date    APPENDECTOMY      ARTHROSCOPY SHOULDER ROTATOR CUFF REPAIR, OPEN BICEP TENODESIS REPAIR Left 9/24/2019    Procedure: Left Arthroscopic Rotator Cuff Repair, Subacromial Decompression, Open Biceps Tenodesis;  Surgeon: Yen Spann MD;  Location:  OR    [facility] NONSPECIFIC PROCEDURE      S/P vasectomy     Current providers sharing in care for this patient include:  Patient Care Team:  Geovany Perkins MD as PCP - General (Family Practice)  Geovany Perkins MD as Assigned PCP  Yen Lawson RD as Diabetes Educator (Dietitian, Registered)    The following health maintenance items are reviewed in Epic and correct as of today:  Health Maintenance   Topic Date Due    RSV VACCINE (1 - Risk 60-74 years 1-dose series) Never done    ZOSTER IMMUNIZATION (3 of 3) 11/25/2021    BMP  01/22/2025    CMP  01/22/2025    LIPID  01/22/2025    MICROALBUMIN  01/22/2025    PSA  01/22/2025    DIABETIC FOOT EXAM  02/13/2025    EYE EXAM  08/29/2025    A1C  10/10/2025    MEDICARE ANNUAL WELLNESS VISIT  04/10/2026    ANNUAL REVIEW OF HM ORDERS  04/10/2026    FALL RISK ASSESSMENT  04/10/2026    CBC  04/10/2026    DTAP/TDAP/TD IMMUNIZATION (3 - Td or Tdap) 02/12/2028    ADVANCE CARE PLANNING  04/10/2030    HEPATITIS C SCREENING  Completed     "PHQ-2 (once per calendar year)  Completed    INFLUENZA VACCINE  Completed    AORTIC ANEURYSM SCREENING (SYSTEM ASSIGNED)  Completed    HPV IMMUNIZATION  Aged Out    MENINGITIS IMMUNIZATION  Aged Out    Pneumococcal Vaccine: 50+ Years  Discontinued    COLORECTAL CANCER SCREENING  Discontinued    LUNG CANCER SCREENING  Discontinued    COVID-19 Vaccine  Discontinued         Review of Systems  Constitutional, HEENT, cardiovascular, pulmonary, GI, , musculoskeletal, neuro, skin, endocrine and psych systems are negative, except as otherwise noted.     Objective    Exam  BP (!) 162/68   Pulse 74   Temp 98.1  F (36.7  C) (Tympanic)   Resp 18   Ht 1.702 m (5' 7\")   Wt 111.1 kg (245 lb)   SpO2 98%   BMI 38.37 kg/m     Estimated body mass index is 38.37 kg/m  as calculated from the following:    Height as of this encounter: 1.702 m (5' 7\").    Weight as of this encounter: 111.1 kg (245 lb).    Physical Exam  GENERAL: alert and no distress  EYES: Eyes grossly normal to inspection, PERRL and conjunctivae and sclerae normal  HENT: ear canals and TM's normal, nose and mouth without ulcers or lesions  NECK: no adenopathy, no asymmetry, masses, or scars  RESP: lungs clear to auscultation - no rales, rhonchi or wheezes  CV: regular rates and rhythm, normal S1 S2, no S3 or S4, grade grade 2/6 systolic murmur-chronic no peripheral edema  ABDOMEN: soft, nontender, no hepatosplenomegaly, no masses and bowel sounds normal  MS: no gross musculoskeletal defects noted, no edema  SKIN: no suspicious lesions or rashes  NEURO: Normal strength and tone, mentation intact and speech normal  PSYCH: mentation appears normal, affect normal/bright        4/10/2025   Mini Cog   Clock Draw Score 2 Normal   3 Item Recall 1 object recalled   Mini Cog Total Score 3             4/10/2025   Vision Screen   Patient wears corrective lenses (select all that apply) NOT worn during vision screen   L 20/50 R 20/40    Signed Electronically by: Angie Shane, " CNP

## 2025-04-10 NOTE — PATIENT INSTRUCTIONS
Patient Education   Preventive Care Advice   This is general advice given by our system to help you stay healthy. However, your care team may have specific advice just for you. Please talk to your care team about your preventive care needs.  Nutrition  Eat 5 or more servings of fruits and vegetables each day.  Try wheat bread, brown rice and whole grain pasta (instead of white bread, rice, and pasta).  Get enough calcium and vitamin D. Check the label on foods and aim for 100% of the RDA (recommended daily allowance).  Lifestyle  Exercise at least 150 minutes each week  (30 minutes a day, 5 days a week).  Do muscle strengthening activities 2 days a week. These help control your weight and prevent disease.  No smoking.  Wear sunscreen to prevent skin cancer.  Have a dental exam and cleaning every 6 months.  Yearly exams  See your health care team every year to talk about:  Any changes in your health.  Any medicines your care team has prescribed.  Preventive care, family planning, and ways to prevent chronic diseases.  Shots (vaccines)   HPV shots (up to age 26), if you've never had them before.  Hepatitis B shots (up to age 59), if you've never had them before.  COVID-19 shot: Get this shot when it's due.  Flu shot: Get a flu shot every year.  Tetanus shot: Get a tetanus shot every 10 years.  Pneumococcal, hepatitis A, and RSV shots: Ask your care team if you need these based on your risk.  Shingles shot (for age 50 and up)  General health tests  Diabetes screening:  Starting at age 35, Get screened for diabetes at least every 3 years.  If you are younger than age 35, ask your care team if you should be screened for diabetes.  Cholesterol test: At age 39, start having a cholesterol test every 5 years, or more often if advised.  Bone density scan (DEXA): At age 50, ask your care team if you should have this scan for osteoporosis (brittle bones).  Hepatitis C: Get tested at least once in your life.  STIs (sexually  transmitted infections)  Before age 24: Ask your care team if you should be screened for STIs.  After age 24: Get screened for STIs if you're at risk. You are at risk for STIs (including HIV) if:  You are sexually active with more than one person.  You don't use condoms every time.  You or a partner was diagnosed with a sexually transmitted infection.  If you are at risk for HIV, ask about PrEP medicine to prevent HIV.  Get tested for HIV at least once in your life, whether you are at risk for HIV or not.  Cancer screening tests  Cervical cancer screening: If you have a cervix, begin getting regular cervical cancer screening tests starting at age 21.  Breast cancer scan (mammogram): If you've ever had breasts, begin having regular mammograms starting at age 40. This is a scan to check for breast cancer.  Colon cancer screening: It is important to start screening for colon cancer at age 45.  Have a colonoscopy test every 10 years (or more often if you're at risk) Or, ask your provider about stool tests like a FIT test every year or Cologuard test every 3 years.  To learn more about your testing options, visit:   .  For help making a decision, visit:   https://bit.ly/wr67586.  Prostate cancer screening test: If you have a prostate, ask your care team if a prostate cancer screening test (PSA) at age 55 is right for you.  Lung cancer screening: If you are a current or former smoker ages 50 to 80, ask your care team if ongoing lung cancer screenings are right for you.  For informational purposes only. Not to replace the advice of your health care provider. Copyright   2023 [facility]. All rights reserved. Clinically reviewed by the [facility]. [place] [id number] - REV 01/24.  Preventing Falls: Care Instructions  Injuries and health problems such as trouble walking or poor eyesight can increase your risk of falling. So can some medicines. But there are things you can do to help  "prevent falls. You can exercise to get stronger. You can also arrange your home to make it safer.    Talk to your doctor about the medicines you take. Ask if any of them increase the risk of falls and whether they can be changed or stopped.   Try to exercise regularly. It can help improve your strength and balance. This can help lower your risk of falling.         Practice fall safety and prevention.   Wear low-heeled shoes that fit well and give your feet good support. Talk to your doctor if you have foot problems that make this hard.  Carry a cellphone or wear a medical alert device that you can use to call for help.  Use stepladders instead of chairs to reach high objects. Don't climb if you're at risk for falls. Ask for help, if needed.  Wear the correct eyeglasses, if you need them.        Make your home safer.   Remove rugs, cords, clutter, and furniture from walkways.  Keep your house well lit. Use night-lights in hallways and bathrooms.  Install and use sturdy handrails on stairways.  Wear nonskid footwear, even inside. Don't walk barefoot or in socks without shoes.        Be safe outside.   Use handrails, curb cuts, and ramps whenever possible.  Keep your hands free by using a shoulder bag or backpack.  Try to walk in well-lit areas. Watch out for uneven ground, changes in pavement, and debris.  Be careful in the winter. Walk on the grass or gravel when sidewalks are slippery. Use de-icer on steps and walkways. Add non-slip devices to shoes.    Put grab bars and nonskid mats in your shower or tub and near the toilet. Try to use a shower chair or bath bench when bathing.   Get into a tub or shower by putting in your weaker leg first. Get out with your strong side first. Have a phone or medical alert device in the bathroom with you.   Where can you learn more?  Go to https://www.EverythingMewise.net/patiented  Enter G117 in the search box to learn more about \"Preventing Falls: Care Instructions.\"  Current as of: " July 31, 2024  Content Version: 14.4    8173-9275 [employer].   Care instructions adapted under license by your healthcare professional. If you have questions about a medical condition or this instruction, always ask your healthcare professional. [employer] disclaims any warranty or liability for your use of this information.    Hearing Loss: Care Instructions  Overview     Hearing loss is a sudden or slow decrease in how well you hear. It can range from slight to profound. Permanent hearing loss can occur with aging. It also can happen when you are exposed long-term to loud noise. Examples include listening to loud music, riding motorcycles, or being around other loud machines.  Hearing loss can affect your work and home life. It can make you feel lonely or depressed. You may feel that you have lost your independence. But hearing aids and other devices can help you hear better and feel connected to others.  Follow-up care is a key part of your treatment and safety. Be sure to make and go to all appointments, and call your doctor if you are having problems. It's also a good idea to know your test results and keep a list of the medicines you take.  How can you care for yourself at home?  Avoid loud noises whenever possible. This helps keep your hearing from getting worse.  Always wear hearing protection around loud noises.  Wear a hearing aid as directed.  A professional can help you pick a hearing aid that will work best for you.  You can also get hearing aids over the counter for mild to moderate hearing loss.  Have hearing tests as your doctor suggests. They can show whether your hearing has changed. Your hearing aid may need to be adjusted.  Use other devices as needed. These may include:  Telephone amplifiers and hearing aids that can connect to a television, stereo, radio, or microphone.  Devices that use lights or vibrations. These alert you to the doorbell, a ringing telephone, or a  "baby monitor.  Television closed-captioning. This shows the words at the bottom of the screen. Most new TVs can do this.  TTY (text telephone). This lets you type messages back and forth on the telephone instead of talking or listening. These devices are also called TDD. When messages are typed on the keyboard, they are sent over the phone line to a receiving TTY. The message is shown on a monitor.  Use text messaging, social media, and email if it is hard for you to communicate by telephone.  Try to learn a listening technique called speechreading. It is not lipreading. You pay attention to people's gestures, expressions, posture, and tone of voice. These clues can help you understand what a person is saying. Face the person you are talking to, and have them face you. Make sure the lighting is good. You need to see the other person's face clearly.  Think about counseling if you need help to adjust to your hearing loss.  When should you call for help?  Watch closely for changes in your health, and be sure to contact your doctor if:    You think your hearing is getting worse.     You have new symptoms, such as dizziness or nausea.   Where can you learn more?  Go to https://www.Spreecast.net/patiented  Enter R798 in the search box to learn more about \"Hearing Loss: Care Instructions.\"  Current as of: October 27, 2024  Content Version: 14.4    7426-4709 [employer].   Care instructions adapted under license by your healthcare professional. If you have questions about a medical condition or this instruction, always ask your healthcare professional. [employer] disclaims any warranty or liability for your use of this information.    Learning About Stress  What is stress?     Stress is your body's response to a hard situation. Your body can have a physical, emotional, or mental response. Stress is a fact of life for most people, and it affects everyone differently. What causes stress for you may not " be stressful for someone else.  A lot of things can cause stress. You may feel stress when you go on a job interview, take a test, or run a race. This kind of short-term stress is normal and even useful. It can help you if you need to work hard or react quickly. For example, stress can help you finish an important job on time.  Long-term stress is caused by ongoing stressful situations or events. Examples of long-term stress include long-term health problems, ongoing problems at work, or conflicts in your family. Long-term stress can harm your health.  How does stress affect your health?  When you are stressed, your body responds as though you are in danger. It makes hormones that speed up your heart, make you breathe faster, and give you a burst of energy. This is called the fight-or-flight stress response. If the stress is over quickly, your body goes back to normal and no harm is done.  But if stress happens too often or lasts too long, it can have bad effects. Long-term stress can make you more likely to get sick, and it can make symptoms of some diseases worse. If you tense up when you are stressed, you may develop neck, shoulder, or low back pain. Stress is linked to high blood pressure and heart disease.  Stress also harms your emotional health. It can make you molina, tense, or depressed. Your relationships may suffer, and you may not do well at work or school.  What can you do to manage stress?  You can try these things to help manage stress:   Do something active. Exercise or activity can help reduce stress. Walking is a great way to get started. Even everyday activities such as housecleaning or yard work can help.  Try yoga or geronimo chi. These techniques combine exercise and meditation. You may need some training at first to learn them.  Do something you enjoy. For example, listen to music or go to a movie. Practice your hobby or do volunteer work.  Meditate. This can help you relax, because you are not  "worrying about what happened before or what may happen in the future.  Do guided imagery. Imagine yourself in any setting that helps you feel calm. You can use online videos, books, or a teacher to guide you.  Do breathing exercises. For example:  From a standing position, bend forward from the waist with your knees slightly bent. Let your arms dangle close to the floor.  Breathe in slowly and deeply as you return to a standing position. Roll up slowly and lift your head last.  Hold your breath for just a few seconds in the standing position.  Breathe out slowly and bend forward from the waist.  Let your feelings out. Talk, laugh, cry, and express anger when you need to. Talking with supportive friends or family, a counselor, or a harlan leader about your feelings is a healthy way to relieve stress. Avoid discussing your feelings with people who make you feel worse.  Write. It may help to write about things that are bothering you. This helps you find out how much stress you feel and what is causing it. When you know this, you can find better ways to cope.  What can you do to prevent stress?  You might try some of these things to help prevent stress:  Manage your time. This helps you find time to do the things you want and need to do.  Get enough sleep. Your body recovers from the stresses of the day while you are sleeping.  Get support. Your family, friends, and community can make a difference in how you experience stress.  Limit your news feed. Avoid or limit time on social media or news that may make you feel stressed.  Do something active. Exercise or activity can help reduce stress. Walking is a great way to get started.  Where can you learn more?  Go to https://www.Sonarworks.net/patiented  Enter N032 in the search box to learn more about \"Learning About Stress.\"  Current as of: October 24, 2024  Content Version: 14.4    4327-6202 [facility] [employer].   Care instructions adapted under license by your " healthcare professional. If you have questions about a medical condition or this instruction, always ask your healthcare professional. [employer] disclaims any warranty or liability for your use of this information.    Learning About Sleeping Well  What does sleeping well mean?     Sleeping well means getting enough sleep to feel good and stay healthy. How much sleep is enough varies among people.  The number of hours you sleep and how you feel when you wake up are both important. If you do not feel refreshed, you probably need more sleep. Another sign of not getting enough sleep is feeling tired during the day.  Experts recommend that adults get at least 7 or more hours of sleep per day. Children and older adults need more sleep.  Why is getting enough sleep important?  Getting enough quality sleep is a basic part of good health. When your sleep suffers, your physical health, mood, and your thoughts can suffer too. You may find yourself feeling more grumpy or stressed. Not getting enough sleep also can lead to serious problems, including injury, accidents, anxiety, and depression.  What might cause poor sleeping?  Many things can cause sleep problems, including:  Changes to your sleep schedule.  Stress. Stress can be caused by fear about a single event, such as giving a speech. Or you may have ongoing stress, such as worry about work or school.  Depression, anxiety, and other mental or emotional conditions.  Changes in your sleep habits or surroundings. This includes changes that happen where you sleep, such as noise, light, or sleeping in a different bed. It also includes changes in your sleep pattern, such as having jet lag or working a late shift.  Health problems, such as pain, breathing problems, and restless legs syndrome.  Lack of regular exercise.  Using alcohol, nicotine, or caffeine before bed.  How can you help yourself?  Here are some tips that may help you sleep more soundly and wake up  "feeling more refreshed.  Your sleeping area   Use your bedroom only for sleeping and sex. A bit of light reading may help you fall asleep. But if it doesn't, do your reading elsewhere in the house. Try not to use your TV, computer, smartphone, or tablet while you are in bed.  Be sure your bed is big enough to stretch out comfortably, especially if you have a sleep partner.  Keep your bedroom quiet, dark, and cool. Use curtains, blinds, or a sleep mask to block out light. To block out noise, use earplugs, soothing music, or a \"white noise\" machine.  Your evening and bedtime routine   Create a relaxing bedtime routine. You might want to take a warm shower or bath, or listen to soothing music.  Go to bed at the same time every night. And get up at the same time every morning, even if you feel tired.  What to avoid   Limit caffeine (coffee, tea, caffeinated sodas) during the day, and don't have any for at least 6 hours before bedtime.  Avoid drinking alcohol before bedtime. Alcohol can cause you to wake up more often during the night.  Try not to smoke or use tobacco, especially in the evening. Nicotine can keep you awake.  Limit naps during the day, especially close to bedtime.  Avoid lying in bed awake for too long. If you can't fall asleep or if you wake up in the middle of the night and can't get back to sleep within about 20 minutes, get out of bed and go to another room until you feel sleepy.  Avoid taking medicine right before bed that may keep you awake or make you feel hyper or energized. Your doctor can tell you if your medicine may do this and if you can take it earlier in the day.  If you can't sleep   Imagine yourself in a peaceful, pleasant scene. Focus on the details and feelings of being in a place that is relaxing.  Get up and do a quiet or boring activity until you feel sleepy.  Avoid drinking any liquids before going to bed to help prevent waking up often to use the bathroom.  Where can you learn " "more?  Go to https://www.[employer] Commission.net/patiented  Enter J942 in the search box to learn more about \"Learning About Sleeping Well.\"  Current as of: July 31, 2024  Content Version: 14.4 2024-2025 [employer].   Care instructions adapted under license by your healthcare professional. If you have questions about a medical condition or this instruction, always ask your healthcare professional. [employer] disclaims any warranty or liability for your use of this information.    Bladder Training: Care Instructions  Your Care Instructions     Bladder training is used to treat urge incontinence and stress incontinence. Urge incontinence means that the need to urinate comes on so fast that you can't get to a toilet in time. Stress incontinence means that you leak urine because of pressure on your bladder. For example, it may happen when you laugh, cough, or lift something heavy.  Bladder training can increase how long you can wait before you have to urinate. It can also help your bladder hold more urine. And it can give you better control over the urge to urinate.  It is important to remember that bladder training takes a few weeks to a few months to make a difference. You may not see results right away, but don't give up.  Follow-up care is a key part of your treatment and safety. Be sure to make and go to all appointments, and call your doctor if you are having problems. It's also a good idea to know your test results and keep a list of the medicines you take.  How can you care for yourself at home?  Work with your doctor to come up with a bladder training program that is right for you. You may use one or more of the following methods.  Delayed urination  In the beginning, try to keep from urinating for 5 minutes after you first feel the need to go.  While you wait, take deep, slow breaths to relax. Kegel exercises can also help you delay the need to go to the bathroom.  After some practice, when " "you can easily wait 5 minutes to urinate, try to wait 10 minutes before you urinate.  Slowly increase the waiting period until you are able to control when you have to urinate.  Scheduled urination  Empty your bladder when you first wake up in the morning.  Schedule times throughout the day when you will urinate.  Start by going to the bathroom every hour, even if you don't need to go.  Slowly increase the time between trips to the bathroom.  When you have found a schedule that works well for you, keep doing it.  If you wake up during the night and have to urinate, do it. Apply your schedule to waking hours only.  Kegel exercises  These tighten and strengthen pelvic muscles, which can help you control the flow of urine. (If doing these exercises causes pain, stop doing them and talk with your doctor.) To do Kegel exercises:  Squeeze your muscles as if you were trying not to pass gas. Or squeeze your muscles as if you were stopping the flow of urine. Your belly, legs, and buttocks shouldn't move.  Hold the squeeze for 3 seconds, then relax for 5 to 10 seconds.  Start with 3 seconds, then add 1 second each week until you are able to squeeze for 10 seconds.  Repeat the exercise 10 times a session. Do 3 to 8 sessions a day.  When should you call for help?  Watch closely for changes in your health, and be sure to contact your doctor if:    Your incontinence is getting worse.     You do not get better as expected.   Where can you learn more?  Go to https://www.Cred[employer]ores-[employer].net/patiented  Enter V684 in the search box to learn more about \"Bladder Training: Care Instructions.\"  Current as of: April 30, 2024  Content Version: 14.4    3139-3198 [email or web address].   Care instructions adapted under license by your healthcare professional. If you have questions about a medical condition or this instruction, always ask your healthcare professional. [email or web address] disclaims any warranty or liability for your use of this " information.    Chronic Pain: Care Instructions  Your Care Instructions     Chronic pain is pain that lasts a long time (months or even years) and may or may not have a clear cause. It is different from acute pain, which usually does have a clear cause--like an injury or illness--and gets better over time. Chronic pain:  Lasts over time but may vary from day to day.  Does not go away despite efforts to end it.  May disrupt your sleep and lead to fatigue.  May cause depression or anxiety.  May make your muscles tense, causing more pain.  Can disrupt your work, hobbies, home life, and relationships with friends and family.  Chronic pain is a very real condition. It is not just in your head. Treatment can help and usually includes several methods used together, such as medicines, physical therapy, exercise, and other treatments. Learning how to relax and changing negative thought patterns can also help you cope.  Chronic pain is complex. Taking an active role in your treatment will help you better manage your pain. Tell your doctor if you have trouble dealing with your pain. You may have to try several things before you find what works best for you.  Follow-up care is a key part of your treatment and safety. Be sure to make and go to all appointments, and call your doctor if you are having problems. It's also a good idea to know your test results and keep a list of the medicines you take.  How can you care for yourself at home?  Pace yourself. Break up large jobs into smaller tasks. Save harder tasks for days when you have less pain, or go back and forth between hard tasks and easier ones. Take rest breaks.  Relax, and reduce stress. Relaxation techniques such as deep breathing or meditation can help.  Keep moving. Gentle, daily exercise can help reduce pain over the long run. Try low- or no-impact exercises such as walking, swimming, and stationary biking. Do stretches to stay flexible.  Try heat, cold packs, and  massage.  Get enough sleep. Chronic pain can make you tired and drain your energy. Talk with your doctor if you have trouble sleeping because of pain.  Think positive. Your thoughts can affect your pain level. Do things that you enjoy to distract yourself when you have pain instead of focusing on the pain. See a movie, read a book, listen to music, or spend time with a friend.  If you think you are depressed, talk to your doctor about treatment.  Keep a daily pain diary. Record how your moods, thoughts, sleep patterns, activities, and medicine affect your pain. You may find that your pain is worse during or after certain activities or when you are feeling a certain emotion. Having a record of your pain can help you and your doctor find the best ways to treat your pain.  Take pain medicines exactly as directed.  If the doctor gave you a prescription medicine for pain, take it as prescribed.  If you are not taking a prescription pain medicine, ask your doctor if you can take an over-the-counter medicine.  Reducing constipation caused by pain medicine  Talk to your doctor about a laxative. If a laxative doesn't work, your doctor may suggest a prescription medicine.  Include fruits, vegetables, beans, and whole grains in your diet each day. These foods are high in fiber.  If your doctor recommends it, get more exercise. Walking is a good choice. Bit by bit, increase the amount you walk every day. Try for at least 30 minutes on most days of the week.  Schedule time each day for a bowel movement. A daily routine may help. Take your time and do not strain when having a bowel movement.  When should you call for help?   Call your doctor now or seek immediate medical care if:    Your pain gets worse or is out of control.     You feel down or blue, or you do not enjoy things like you once did. You may be depressed, which is common in people with chronic pain. Depression can be treated.     You have vomiting or cramps for more  "than 2 hours.   Watch closely for changes in your health, and be sure to contact your doctor if:    You cannot sleep because of pain.     You are very worried or anxious about your pain.     You have trouble taking your pain medicine.     You have any concerns about your pain medicine.     You have trouble with bowel movements, such as:  No bowel movement in 3 days.  Blood in the anal area, in your stool, or on the toilet paper.  Diarrhea for more than 24 hours.   Where can you learn more?  Go to [email or web address].net/patiented  Enter N004 in the search box to learn more about \"Chronic Pain: Care Instructions.\"  Current as of: July 31, 2024  Content Version: 14.4    9503-3391 [employer].   Care instructions adapted under license by your healthcare professional. If you have questions about a medical condition or this instruction, always ask your healthcare professional. [employer] disclaims any warranty or liability for your use of this information.       "

## 2025-04-10 NOTE — TELEPHONE ENCOUNTER
He Is not insulin-dependent and therefore does not cover continuous glucose monitors of his Dexcom G7.  Please inform the patient.

## (undated) DEVICE — TUBING SYSTEM ARTHREX PATIENT REDEUCE AR-6421

## (undated) DEVICE — GLOVE BIOGEL PI SZ 7.5 40875

## (undated) DEVICE — ESU CLEANER TIP 31142717

## (undated) DEVICE — NDL ECLIPSE 18GA 1.5"

## (undated) DEVICE — IMM KIT SHOULDER TMAX MASK FACE 7210559

## (undated) DEVICE — SU MONOCRYL 3-0 PS-1 27" Y936H

## (undated) DEVICE — BUR ARTHREX COOLCUT EXCALIBUR 4.0MMX13CM AR-8400EX

## (undated) DEVICE — DRAPE MAYO STAND 23X54 8337

## (undated) DEVICE — SYR 10ML FINGER CONTROL W/O NDL 309695

## (undated) DEVICE — DRAPE STERI U 1015

## (undated) DEVICE — DRSG STERI STRIP 1/2X4" R1547

## (undated) DEVICE — TUBING SET ARTHREX DUALWAVE OUTFLOW AR-6430

## (undated) DEVICE — SOL NACL 0.9% IRRIG 3000ML BAG 2B7477

## (undated) DEVICE — SOL WATER IRRIG 1000ML BOTTLE 2F7114

## (undated) DEVICE — LINEN ORTHO PACK 5446

## (undated) DEVICE — GLOVE PROTEXIS POWDER FREE SMT 7.0  2D72PT70X

## (undated) DEVICE — DRAPE ARTHROSCOPY SHOULDER BEACHCHAIR 29369

## (undated) DEVICE — IMM KIT SHOULDER STABILIZATION 7210573

## (undated) DEVICE — SUCTION MANIFOLD NEPTUNE 2 SYS 4 PORT 0702-020-000

## (undated) DEVICE — PREP DURAPREP 26ML APL 8630

## (undated) DEVICE — SU NDL MCGOWAN 1/2 1859-6D

## (undated) DEVICE — BRUSH SURGICAL SCRUB W/4% CHG SOL 25ML 371073

## (undated) DEVICE — PACK SHOULDER CUSTOM ASC SOP15ASUMA

## (undated) RX ORDER — LIDOCAINE HYDROCHLORIDE 20 MG/ML
INJECTION, SOLUTION EPIDURAL; INFILTRATION; INTRACAUDAL; PERINEURAL
Status: DISPENSED
Start: 2019-09-24

## (undated) RX ORDER — KETOROLAC TROMETHAMINE 30 MG/ML
INJECTION, SOLUTION INTRAMUSCULAR; INTRAVENOUS
Status: DISPENSED
Start: 2019-09-24

## (undated) RX ORDER — ACETAMINOPHEN 325 MG/1
TABLET ORAL
Status: DISPENSED
Start: 2019-09-24

## (undated) RX ORDER — DEXAMETHASONE SODIUM PHOSPHATE 4 MG/ML
INJECTION, SOLUTION INTRA-ARTICULAR; INTRALESIONAL; INTRAMUSCULAR; INTRAVENOUS; SOFT TISSUE
Status: DISPENSED
Start: 2019-09-24

## (undated) RX ORDER — PROPOFOL 10 MG/ML
INJECTION, EMULSION INTRAVENOUS
Status: DISPENSED
Start: 2019-09-24

## (undated) RX ORDER — CEFAZOLIN SODIUM 1 G/3ML
INJECTION, POWDER, FOR SOLUTION INTRAMUSCULAR; INTRAVENOUS
Status: DISPENSED
Start: 2019-09-24

## (undated) RX ORDER — ONDANSETRON 2 MG/ML
INJECTION INTRAMUSCULAR; INTRAVENOUS
Status: DISPENSED
Start: 2019-09-24

## (undated) RX ORDER — DEXAMETHASONE SODIUM PHOSPHATE 10 MG/ML
INJECTION, SOLUTION INTRAMUSCULAR; INTRAVENOUS
Status: DISPENSED
Start: 2019-09-24

## (undated) RX ORDER — KETAMINE HCL IN 0.9 % NACL 50 MG/5 ML
SYRINGE (ML) INTRAVENOUS
Status: DISPENSED
Start: 2019-09-24

## (undated) RX ORDER — EPINEPHRINE NASAL SOLUTION 1 MG/ML
SOLUTION NASAL
Status: DISPENSED
Start: 2019-09-24

## (undated) RX ORDER — FENTANYL CITRATE 50 UG/ML
INJECTION, SOLUTION INTRAMUSCULAR; INTRAVENOUS
Status: DISPENSED
Start: 2019-09-24

## (undated) RX ORDER — BUPIVACAINE HYDROCHLORIDE 2.5 MG/ML
INJECTION, SOLUTION EPIDURAL; INFILTRATION; INTRACAUDAL
Status: DISPENSED
Start: 2019-09-24

## (undated) RX ORDER — GABAPENTIN 300 MG/1
CAPSULE ORAL
Status: DISPENSED
Start: 2019-09-24